# Patient Record
Sex: MALE | Race: WHITE | NOT HISPANIC OR LATINO | URBAN - METROPOLITAN AREA
[De-identification: names, ages, dates, MRNs, and addresses within clinical notes are randomized per-mention and may not be internally consistent; named-entity substitution may affect disease eponyms.]

---

## 2017-07-10 ENCOUNTER — COMMUNICATION - HEALTHEAST (OUTPATIENT)
Dept: FAMILY MEDICINE | Facility: CLINIC | Age: 62
End: 2017-07-10

## 2017-07-10 DIAGNOSIS — I10 ESSENTIAL HYPERTENSION: ICD-10-CM

## 2017-09-05 ENCOUNTER — RECORDS - HEALTHEAST (OUTPATIENT)
Dept: ADMINISTRATIVE | Facility: OTHER | Age: 62
End: 2017-09-05

## 2018-01-19 ENCOUNTER — OFFICE VISIT - HEALTHEAST (OUTPATIENT)
Dept: FAMILY MEDICINE | Facility: CLINIC | Age: 63
End: 2018-01-19

## 2018-01-19 ENCOUNTER — RECORDS - HEALTHEAST (OUTPATIENT)
Dept: GENERAL RADIOLOGY | Facility: CLINIC | Age: 63
End: 2018-01-19

## 2018-01-19 DIAGNOSIS — E66.01 MORBID OBESITY (H): ICD-10-CM

## 2018-01-19 DIAGNOSIS — E88.810 METABOLIC SYNDROME: ICD-10-CM

## 2018-01-19 DIAGNOSIS — Z23 NEED FOR TETANUS BOOSTER: ICD-10-CM

## 2018-01-19 DIAGNOSIS — I10 ESSENTIAL (PRIMARY) HYPERTENSION: ICD-10-CM

## 2018-01-19 DIAGNOSIS — I10 ESSENTIAL HYPERTENSION: ICD-10-CM

## 2018-01-19 LAB
ALBUMIN SERPL-MCNC: 3.8 G/DL (ref 3.5–5)
ALP SERPL-CCNC: 77 U/L (ref 45–120)
ALT SERPL W P-5'-P-CCNC: 28 U/L (ref 0–45)
ANION GAP SERPL CALCULATED.3IONS-SCNC: 7 MMOL/L (ref 5–18)
AST SERPL W P-5'-P-CCNC: 21 U/L (ref 0–40)
BILIRUB SERPL-MCNC: 0.8 MG/DL (ref 0–1)
BUN SERPL-MCNC: 15 MG/DL (ref 8–22)
CALCIUM SERPL-MCNC: 9 MG/DL (ref 8.5–10.5)
CHLORIDE BLD-SCNC: 103 MMOL/L (ref 98–107)
CHOLEST SERPL-MCNC: 161 MG/DL
CO2 SERPL-SCNC: 29 MMOL/L (ref 22–31)
CREAT SERPL-MCNC: 0.8 MG/DL (ref 0.7–1.3)
FASTING STATUS PATIENT QL REPORTED: NO
GFR SERPL CREATININE-BSD FRML MDRD: >60 ML/MIN/1.73M2
GLUCOSE BLD-MCNC: 89 MG/DL (ref 70–125)
HBA1C MFR BLD: 5.5 % (ref 3.5–6)
HDLC SERPL-MCNC: 27 MG/DL
LDLC SERPL CALC-MCNC: 92 MG/DL
POTASSIUM BLD-SCNC: 4.4 MMOL/L (ref 3.5–5)
PROT SERPL-MCNC: 6.5 G/DL (ref 6–8)
SODIUM SERPL-SCNC: 139 MMOL/L (ref 136–145)
TRIGL SERPL-MCNC: 208 MG/DL

## 2018-01-19 ASSESSMENT — MIFFLIN-ST. JEOR: SCORE: 2237.76

## 2018-01-22 ENCOUNTER — COMMUNICATION - HEALTHEAST (OUTPATIENT)
Dept: FAMILY MEDICINE | Facility: CLINIC | Age: 63
End: 2018-01-22

## 2019-02-19 ENCOUNTER — OFFICE VISIT - HEALTHEAST (OUTPATIENT)
Dept: FAMILY MEDICINE | Facility: CLINIC | Age: 64
End: 2019-02-19

## 2019-02-19 DIAGNOSIS — I10 ESSENTIAL HYPERTENSION: ICD-10-CM

## 2019-02-19 DIAGNOSIS — M17.0 PRIMARY OSTEOARTHRITIS OF BOTH KNEES: ICD-10-CM

## 2019-02-19 DIAGNOSIS — E66.01 MORBID OBESITY (H): ICD-10-CM

## 2019-02-19 LAB
ALBUMIN SERPL-MCNC: 3.9 G/DL (ref 3.5–5)
ALP SERPL-CCNC: 69 U/L (ref 45–120)
ALT SERPL W P-5'-P-CCNC: 36 U/L (ref 0–45)
ANION GAP SERPL CALCULATED.3IONS-SCNC: 11 MMOL/L (ref 5–18)
AST SERPL W P-5'-P-CCNC: 21 U/L (ref 0–40)
BILIRUB SERPL-MCNC: 0.9 MG/DL (ref 0–1)
BUN SERPL-MCNC: 12 MG/DL (ref 8–22)
CALCIUM SERPL-MCNC: 9.1 MG/DL (ref 8.5–10.5)
CHLORIDE BLD-SCNC: 105 MMOL/L (ref 98–107)
CHOLEST SERPL-MCNC: 160 MG/DL
CO2 SERPL-SCNC: 25 MMOL/L (ref 22–31)
CREAT SERPL-MCNC: 0.87 MG/DL (ref 0.7–1.3)
FASTING STATUS PATIENT QL REPORTED: NO
GFR SERPL CREATININE-BSD FRML MDRD: >60 ML/MIN/1.73M2
GLUCOSE BLD-MCNC: 97 MG/DL (ref 70–125)
HBA1C MFR BLD: 5.2 % (ref 3.5–6)
HDLC SERPL-MCNC: 28 MG/DL
LDLC SERPL CALC-MCNC: 80 MG/DL
POTASSIUM BLD-SCNC: 4.2 MMOL/L (ref 3.5–5)
PROT SERPL-MCNC: 6.6 G/DL (ref 6–8)
SODIUM SERPL-SCNC: 141 MMOL/L (ref 136–145)
TRIGL SERPL-MCNC: 259 MG/DL

## 2019-02-20 ENCOUNTER — COMMUNICATION - HEALTHEAST (OUTPATIENT)
Dept: FAMILY MEDICINE | Facility: CLINIC | Age: 64
End: 2019-02-20

## 2020-03-26 ENCOUNTER — COMMUNICATION - HEALTHEAST (OUTPATIENT)
Dept: FAMILY MEDICINE | Facility: CLINIC | Age: 65
End: 2020-03-26

## 2020-03-26 DIAGNOSIS — I10 ESSENTIAL HYPERTENSION: ICD-10-CM

## 2020-07-13 ENCOUNTER — COMMUNICATION - HEALTHEAST (OUTPATIENT)
Dept: FAMILY MEDICINE | Facility: CLINIC | Age: 65
End: 2020-07-13

## 2020-07-13 DIAGNOSIS — I10 ESSENTIAL HYPERTENSION: ICD-10-CM

## 2020-08-10 ENCOUNTER — COMMUNICATION - HEALTHEAST (OUTPATIENT)
Dept: FAMILY MEDICINE | Facility: CLINIC | Age: 65
End: 2020-08-10

## 2020-08-10 DIAGNOSIS — I10 ESSENTIAL HYPERTENSION: ICD-10-CM

## 2020-09-14 ENCOUNTER — COMMUNICATION - HEALTHEAST (OUTPATIENT)
Dept: FAMILY MEDICINE | Facility: CLINIC | Age: 65
End: 2020-09-14

## 2020-09-14 DIAGNOSIS — I10 ESSENTIAL HYPERTENSION: ICD-10-CM

## 2020-10-19 ENCOUNTER — COMMUNICATION - HEALTHEAST (OUTPATIENT)
Dept: FAMILY MEDICINE | Facility: CLINIC | Age: 65
End: 2020-10-19

## 2020-10-19 DIAGNOSIS — I10 ESSENTIAL HYPERTENSION: ICD-10-CM

## 2020-10-20 ENCOUNTER — OFFICE VISIT - HEALTHEAST (OUTPATIENT)
Dept: FAMILY MEDICINE | Facility: CLINIC | Age: 65
End: 2020-10-20

## 2020-10-20 ENCOUNTER — COMMUNICATION - HEALTHEAST (OUTPATIENT)
Dept: FAMILY MEDICINE | Facility: CLINIC | Age: 65
End: 2020-10-20

## 2020-10-20 DIAGNOSIS — Z11.59 ENCOUNTER FOR HCV SCREENING TEST FOR LOW RISK PATIENT: ICD-10-CM

## 2020-10-20 DIAGNOSIS — I10 ESSENTIAL HYPERTENSION: ICD-10-CM

## 2020-10-20 LAB
ALBUMIN SERPL-MCNC: 3.5 G/DL (ref 3.5–5)
ALP SERPL-CCNC: 59 U/L (ref 45–120)
ALT SERPL W P-5'-P-CCNC: 21 U/L (ref 0–45)
ANION GAP SERPL CALCULATED.3IONS-SCNC: 8 MMOL/L (ref 5–18)
AST SERPL W P-5'-P-CCNC: 21 U/L (ref 0–40)
BILIRUB SERPL-MCNC: 0.8 MG/DL (ref 0–1)
BUN SERPL-MCNC: 11 MG/DL (ref 8–22)
CALCIUM SERPL-MCNC: 8.5 MG/DL (ref 8.5–10.5)
CHLORIDE BLD-SCNC: 99 MMOL/L (ref 98–107)
CHOLEST SERPL-MCNC: 103 MG/DL
CO2 SERPL-SCNC: 30 MMOL/L (ref 22–31)
CREAT SERPL-MCNC: 0.78 MG/DL (ref 0.7–1.3)
FASTING STATUS PATIENT QL REPORTED: YES
GFR SERPL CREATININE-BSD FRML MDRD: >60 ML/MIN/1.73M2
GLUCOSE BLD-MCNC: 99 MG/DL (ref 70–125)
HCV AB SERPL QL IA: NEGATIVE
HDLC SERPL-MCNC: 20 MG/DL
LDLC SERPL CALC-MCNC: 57 MG/DL
POTASSIUM BLD-SCNC: 4.4 MMOL/L (ref 3.5–5)
PROT SERPL-MCNC: 6.6 G/DL (ref 6–8)
SODIUM SERPL-SCNC: 137 MMOL/L (ref 136–145)
TRIGL SERPL-MCNC: 131 MG/DL

## 2020-10-20 RX ORDER — ATENOLOL 25 MG/1
TABLET ORAL
Qty: 90 TABLET | Refills: 3 | Status: SHIPPED | OUTPATIENT
Start: 2020-10-20 | End: 2022-01-17

## 2020-10-20 ASSESSMENT — MIFFLIN-ST. JEOR: SCORE: 2133.43

## 2020-11-02 ENCOUNTER — VIRTUAL VISIT (OUTPATIENT)
Dept: FAMILY MEDICINE | Facility: OTHER | Age: 65
End: 2020-11-02
Payer: COMMERCIAL

## 2020-11-02 PROCEDURE — 99421 OL DIG E/M SVC 5-10 MIN: CPT | Performed by: PHYSICIAN ASSISTANT

## 2020-11-02 NOTE — PROGRESS NOTES
"Date: 2020 10:14:11  Clinician: Jerome Nelson  Clinician NPI: [id number]  Patient: kaya baker  Patient : 1955  Patient Address: [address] [place], [place] [address] [address]  Patient Phone: [phone]  Visit Protocol: URI  Patient Summary:  kaya is a 65 year old ( : 1955 ) male who initiated a [facility] Visit for COVID-19 (Coronavirus) evaluation and screening. When asked the question \"Please sign me up to receive news, health information and promotions from [facility].\", kaya responded \"Yes\".    kaya states his symptoms started gradually 3-4 days ago. After his symptoms started, they improved and then got worse again.   His symptoms consist of malaise, a sore throat, and a cough.   Symptom details     Cough: kaya coughs every 5-10 minutes and his cough is not more bothersome at night. Phlegm does not come into his throat when he coughs. He does not believe his cough is caused by post-nasal drip.     Sore throat: kaya reports having moderate throat pain (4-6 on a 10 point pain scale), does not have exudate on his tonsils, and can swallow liquids. He is not sure if the lymph nodes in his neck are enlarged. A rash has not appeared on the skin since the sore throat started.      kaya denies having vomiting, rhinitis, facial pain or pressure, myalgias, chills, teeth pain, ageusia, diarrhea, ear pain, headache, wheezing, fever, nasal congestion, nausea, and anosmia. He also denies having recent facial or sinus surgery in the past 60 days. He is not experiencing dyspnea.   Precipitating events  Within the past week, kaya has not been exposed to someone with strep throat. He has not recently been exposed to someone with influenza. kaya has not been in close contact with any high risk individuals.   Pertinent COVID-19 (Coronavirus) information  kaya does not work or volunteer as healthcare worker or a . In the past 14 days, kaya has not worked or volunteered at a healthcare facility " or group living setting.   In the past 14 days, he also has not lived in a congregate living setting.   kaya has had a close contact with a laboratory-confirmed COVID-19 patient within 14 days of symptom onset. He was not exposed at his work. Date kaya was exposed to the laboratory-confirmed COVID-19 patient: 11/02/2020   Additional information about contact with COVID-19 (Coronavirus) patient as reported by the patient (free text): roommate at home    Since December 2019, kaya has not been tested for COVID-19 and has had upper respiratory infection (URI) or influenza-like illness.      Date(s) of previous URI or influenza-like illness (free-text): 10/22/2020 -11/02/2020     Symptoms kaya experienced during previous URI or influenza-like illness as reported by the patient (free-text): flu like symtoms        Pertinent medical history  kaya has taken an antibiotic medication in the past month. Antibiotic details as reported by the patient (free text): chavoks jordin   kaya needs a return to work/school note.   Weight: 300 lbs   kaya does not smoke or use smokeless tobacco.   Weight: 300 lbs    MEDICATIONS: atenolol-chlorthalidone oral, ALLERGIES: NKDA  Clinician Response:  Dear kaya,   Based on your exposure to COVID-19 (coronavirus), we would like to test you for this virus.  1. Please call [phone] to schedule your visit. Explain that you were referred by [facility] to have a COVID-19 test. Be ready to share your [facility] visit ID number.   The following will serve as your written order for this COVID Test, ordered by me, for the indication of suspected COVID [Z20.828]: The test will be ordered in [facility], our electronic health record, after you are scheduled. It will show as ordered and authorized by Miky Villa MD.  Order: COVID-19 (coronavirus) PCR for ASYMPTOMATIC EXPOSURE testing from [facility].   If you know you have had close contact with someone who tested positive, you should be quarantined for 14  days after this exposure. You should stay in quarantine for the14 days even if the covid test is negative, the optimal time to test after exposure is 5-7 days from the exposure  Quarantine means   What should I do?  For safety, it's very important to follow these rules. Do this for 14 days after the date you were last exposed to the virus..  Stay home and away from others. Don't go to school or anywhere else. Generally quarantine means staying home from work but there are some exceptions to this. Please contact your workplace.   No hugging, kissing or shaking hands.  Don't let anyone visit.  Cover your mouth and nose with a mask, tissue or washcloth to avoid spreading germs.  Wash your hands and face often. Use soap and water.  What are the symptoms of COVID-19?  The most common symptoms are cough, fever and trouble breathing. Less common symptoms include headache, body aches, fatigue (feeling very tired), chills, sore throat, stuffy or runny nose, diarrhea (loose poop), loss of taste or smell, belly pain, and nausea or vomiting (feeling sick to your stomach or throwing up).  After 14 days, if you have still don't have symptoms, you likely don't have this virus.  If you develop symptoms, follow these guidelines.  If you're normally healthy: Please start another [facility] visit to report your symptoms. Go to [email or web address].  If you have a serious health problem (like cancer, heart failure, an organ transplant or kidney disease): Call your specialty clinic. Let them know that you might have COVID-19.  2. When it's time for your COVID test:  Stay at least 6 feet away from others. (If someone will drive you to your test, stay in the backseat, as far away from the  as you can.)  Cover your mouth and nose with a mask, tissue or washcloth.  Go straight to the testing site. Don't make any stops on the way there or back.  Please note  Caregivers in these groups are at risk for severe illness due to COVID-19:  o People 65  years and older  o People who live in a nursing home or long-term care facility  o People with chronic disease (lung, heart, cancer, diabetes, kidney, liver, immunologic)  o People who have a weakened immune system, including those who:  Are in cancer treatment  Take medicine that weakens the immune system, such as corticosteroids  Had a bone marrow or organ transplant  Have an immune deficiency  Have poorly controlled HIV or AIDS  Are obese (body mass index of 40 or higher)  Smoke regularly  Where can I get more information?  [facility] -- About COVID-19: [email or web address]  CDC -- What to Do If You're Sick: www.cdc.gov/coronavirus/2019-ncov/about/steps-when-sick.html  CDC -- Ending Home Isolation: www.cdc.gov/coronavirus/2019-ncov/hcp/disposition-in-home-patients.html  [facility] -- Caring for Someone: www.cdc.gov/coronavirus/2019-ncov/if-you-are-sick/care-for-someone.html  [facility] -- Interim Guidance for Hospital Discharge to Home: www.[facility].[place]./diseases/coronavirus/hcp/hospdischarge.pdf  [facility] clinical trials (COVID-19 research studies): clinicalaffairs.[facility]-clinical-trials  Below are the COVID-19 hotlines at the [place] Department of Health ([facility]). Interpreters are available.  For health questions: Call 105-768-3648 or 1-292.451.7204 (7 a.m. to 7 p.m.)  For questions about schools and childcare: Call 741-141-8624 or 1-384.714.8965 (7 a.m. to 7 p.m.)    Diagnosis: Contact with and (suspected) exposure to other viral communicable diseases  Diagnosis ICD: Z20.828

## 2020-11-04 ENCOUNTER — AMBULATORY - HEALTHEAST (OUTPATIENT)
Dept: FAMILY MEDICINE | Facility: CLINIC | Age: 65
End: 2020-11-04

## 2020-11-04 DIAGNOSIS — Z20.822 SUSPECTED COVID-19 VIRUS INFECTION: ICD-10-CM

## 2020-11-05 ENCOUNTER — AMBULATORY - HEALTHEAST (OUTPATIENT)
Dept: FAMILY MEDICINE | Facility: CLINIC | Age: 65
End: 2020-11-05

## 2020-11-05 DIAGNOSIS — Z20.822 SUSPECTED COVID-19 VIRUS INFECTION: ICD-10-CM

## 2020-11-06 ENCOUNTER — COMMUNICATION - HEALTHEAST (OUTPATIENT)
Dept: SCHEDULING | Facility: CLINIC | Age: 65
End: 2020-11-06

## 2021-04-16 ENCOUNTER — COMMUNICATION - HEALTHEAST (OUTPATIENT)
Dept: FAMILY MEDICINE | Facility: CLINIC | Age: 66
End: 2021-04-16

## 2021-05-31 VITALS — WEIGHT: 315 LBS | HEIGHT: 68 IN | BODY MASS INDEX: 47.74 KG/M2

## 2021-06-02 VITALS — WEIGHT: 315 LBS | BODY MASS INDEX: 49.19 KG/M2

## 2021-06-04 VITALS
BODY MASS INDEX: 46.07 KG/M2 | DIASTOLIC BLOOD PRESSURE: 85 MMHG | HEIGHT: 68 IN | TEMPERATURE: 99.6 F | HEART RATE: 77 BPM | RESPIRATION RATE: 22 BRPM | WEIGHT: 304 LBS | SYSTOLIC BLOOD PRESSURE: 136 MMHG

## 2021-06-07 NOTE — TELEPHONE ENCOUNTER
Patient needs to be called and informed his rx has been refilled and sent to the [facility] pharmacy. He presented to the clinic regarding this.

## 2021-06-12 NOTE — PROGRESS NOTES
"ASSESSMENT/PLAN:  1. Essential hypertension  atenoloL (TENORMIN) 25 MG tablet    Comprehensive Metabolic Panel    Lipid Cascade FASTING   2. Encounter for HCV screening test for low risk patient  Hepatitis C Antibody (Anti-HCV)       This is a 66 yo male with:  1.  Hypertension - blood pressure is actually controlled today!  He continues to work - splits time between [facility] and [place], [place].  Feels good - very active when home in [place].  Check labs today - continue same medications.  2.  Health Maintenance - discussed recommendation for Hepatitis C screening - ordered; reviewed immunizations due - declined; declined other cancer screening  Return in about 6 months (around 4/20/2021) for BP Check.      Medications Discontinued During This Encounter   Medication Reason     atenoloL (TENORMIN) 25 MG tablet Reorder     There are no Patient Instructions on file for this visit.    Chief Complaint:  Chief Complaint   Patient presents with     medication check       HPI:   Miguel Baca is a 65 y.o. 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    No chest pain, no short of breath  Just slowing up a little bit - \"natural\"        PMH:   Patient Active Problem List    Diagnosis Date Noted     Primary osteoarthritis of both knees 02/19/2019     Morbid obesity (H)      Essential Hypertension      Seborrheic Keratosis      Knee Sprain      Past Medical History:   Diagnosis Date     Acute chest pain 12/22/2015     Cellulitis     Created by Conversion      Colon polyps      Cough     Created by Conversion      Hypertension      Past Surgical History:   Procedure Laterality Date     KNEE ARTHROSCOPY Left 2012     Social History     Socioeconomic History     Marital status: Single     Spouse name: Not on file     Number of children: Not on file     Years of education: Not on file     Highest education level: Not on " file   Occupational History     Occupation: works city desk for [employer]     Employer: [employer]   Social Needs     Financial resource strain: Not on file     Food insecurity     Worry: Not on file     Inability: Not on file     Transportation needs     Medical: Not on file     Non-medical: Not on file   Tobacco Use     Smoking status: Former Smoker     Smokeless tobacco: Never Used   Substance and Sexual Activity     Alcohol use: Not on file     Drug use: Not on file     Sexual activity: Not on file   Lifestyle     Physical activity     Days per week: Not on file     Minutes per session: Not on file     Stress: Not on file   Relationships     Social connections     Talks on phone: Not on file     Gets together: Not on file     Attends Yazdanism service: Not on file     Active member of club or organization: Not on file     Attends meetings of clubs or organizations: Not on file     Relationship status: Not on file     Intimate partner violence     Fear of current or ex partner: Not on file     Emotionally abused: Not on file     Physically abused: Not on file     Forced sexual activity: Not on file   Other Topics Concern     Not on file   Social History Narrative    Lives independently near [place], [place]    Works in [place] so rents a room from a [employer] during the work week     Family History   Problem Relation Age of Onset     Coronary artery disease Father         s/p stents, CABG     Coronary artery disease Brother         s/p stents     Cancer Mother      Parkinsonism Paternal Grandmother      Diabetes type II Paternal Grandmother        Meds:    Current Outpatient Medications:      aspirin 81 MG EC tablet, Take 81 mg by mouth daily., Disp: , Rfl:      atenoloL (TENORMIN) 25 MG tablet, TAKE 1 TABLET BY MOUTH ONCE DAILY ., Disp: 90 tablet, Rfl: 3    Allergies:  No Known Allergies    ROS:  Pertinent positives as noted in HPI; otherwise 12 point ROS negative.      Physical  "Exam:  EXAM:  /85 (Patient Site: Right Arm, Patient Position: Sitting, Cuff Size: Adult Large)   Pulse 77   Temp 99.6  F (37.6  C) (Tympanic)   Resp 22   Ht 5' 8\" (1.727 m)   Wt (!) 304 lb (137.9 kg)   BMI 46.22 kg/m     Gen:  NAD, appears well, well-hydrated  HEENT:  TMs nl, oropharynx benign, nasal mucosa nl, conjunctiva clear  Neck:  Supple, no adenopathy, no thyromegaly, no carotid bruits, no JVD  Lungs:  Clear to auscultation bilaterally  Cor:  RRR no murmur  Abd:  Soft, nontender, BS+, no masses, no guarding or rebound, no HSM  Extr:  Neg., no edema, normal pulses  Neuro:  No asymmetry  Skin:  Warm/dry        Results:  Results for orders placed or performed in visit on 10/20/20   Comprehensive Metabolic Panel   Result Value Ref Range    Sodium 137 136 - 145 mmol/L    Potassium 4.4 3.5 - 5.0 mmol/L    Chloride 99 98 - 107 mmol/L    CO2 30 22 - 31 mmol/L    Anion Gap, Calculation 8 5 - 18 mmol/L    Glucose 99 70 - 125 mg/dL    BUN 11 8 - 22 mg/dL    Creatinine 0.78 0.70 - 1.30 mg/dL    GFR MDRD Af Amer >60 >60 mL/min/1.73m2    GFR MDRD Non Af Amer >60 >60 mL/min/1.73m2    Bilirubin, Total 0.8 0.0 - 1.0 mg/dL    Calcium 8.5 8.5 - 10.5 mg/dL    Protein, Total 6.6 6.0 - 8.0 g/dL    Albumin 3.5 3.5 - 5.0 g/dL    Alkaline Phosphatase 59 45 - 120 U/L    AST 21 0 - 40 U/L    ALT 21 0 - 45 U/L   Lipid Cascade FASTING   Result Value Ref Range    Cholesterol 103 <=199 mg/dL    Triglycerides 131 <=149 mg/dL    HDL Cholesterol 20 (L) >=40 mg/dL    LDL Calculated 57 <=129 mg/dL    Patient Fasting > 8hrs? Yes    Hepatitis C Antibody (Anti-HCV)   Result Value Ref Range    Hepatitis C Ab Negative Negative             "

## 2021-06-15 NOTE — PROGRESS NOTES
ASSESSMENT/PLAN:  1. Essential hypertension  atenolol (TENORMIN) 25 MG tablet    Comprehensive Metabolic Panel    Lipid Profile    XR Chest 2 Views   2. Morbid obesity  Glycosylated Hemoglobin A1c   3. Metabolic syndrome  Glycosylated Hemoglobin A1c   4. Need for tetanus booster  CANCELED: Td, Preservative Free (green label)       This is a 61 yo male here for:  1.  Essential Hypertension - blood pressure currently well controlled at 132/72.  Currently taking only Atenolol.  Doing well - check CMP, lipid profile.    Will check a Chest xray - reviewed with patient - looks normal.    2.  Metabolic syndrome/morbid obesity - check A1c.  Discussed diet/exercise  The following high BMI interventions were performed this visit: encouragement to exercise and lifestyle education regarding diet  3.  Health Maintenance - due for tetanus booster.  Will give Td today.         Medications Discontinued During This Encounter   Medication Reason     atenolol (TENORMIN) 25 MG tablet Reorder     There are no Patient Instructions on file for this visit.    Chief Complaint:  Chief Complaint   Patient presents with     Medication Refill     Blood Pressure Check       HPI:   Miguel Baca is a 62 y.o. male c/o  Still working  No problems  No cp, no SOB      PMH:   Patient Active Problem List    Diagnosis Date Noted     Morbid obesity      Essential Hypertension      Seborrheic Keratosis      Knee Sprain      Past Medical History:   Diagnosis Date     Acute chest pain 12/22/2015     Cellulitis     Created by Conversion      Colon polyps      Cough     Created by Conversion      Hypertension      Past Surgical History:   Procedure Laterality Date     KNEE ARTHROSCOPY Left 2012     Social History     Social History     Marital status: Single     Spouse name: N/A     Number of children: N/A     Years of education: N/A     Occupational History     works city desk for [employer]     Social History Main Topics     Smoking  "status: Former Smoker     Smokeless tobacco: Never Used     Alcohol use Not on file     Drug use: Not on file     Sexual activity: Not on file     Other Topics Concern     Not on file     Social History Narrative    Lives independently near [facility], [place]    Works in [place] so rents a room from a [employer] during the work week       Meds:    Current Outpatient Prescriptions:      aspirin 81 MG EC tablet, Take 81 mg by mouth daily., Disp: , Rfl:      atenolol (TENORMIN) 25 MG tablet, TAKE 1 TABLET BY MOUTH EVERY DAY, Disp: 90 tablet, Rfl: 3    Allergies:  No Known Allergies    ROS:  Pertinent positives as noted in HPI; otherwise 12 point ROS negative.      Physical Exam:  EXAM:  /72 (Patient Site: Left Arm, Patient Position: Sitting, Cuff Size: Adult Large)  Pulse 63  Temp 97.8  F (36.6  C) (Oral)   Resp 14  Ht 5' 8\" (1.727 m)  Wt (!) 327 lb (148.3 kg)  SpO2 93%  BMI 49.72 kg/m2   Gen:  NAD, appears well, well-hydrated  HEENT:  TMs nl, oropharynx benign, nasal mucosa nl, conjunctiva clear  Neck:  Supple, no adenopathy, no thyromegaly, no carotid bruits, no JVD  Lungs:  Clear to auscultation bilaterally  Cor:  RRR no murmur  Abd:  Soft, nontender, BS+, no masses, no guarding or rebound, no HSM  Extr:  Neg.  Neuro:  No asymmetry  Skin:  Warm/dry        Results:  Results for orders placed or performed in visit on 01/19/18   Comprehensive Metabolic Panel   Result Value Ref Range    Sodium 139 136 - 145 mmol/L    Potassium 4.4 3.5 - 5.0 mmol/L    Chloride 103 98 - 107 mmol/L    CO2 29 22 - 31 mmol/L    Anion Gap, Calculation 7 5 - 18 mmol/L    Glucose 89 70 - 125 mg/dL    BUN 15 8 - 22 mg/dL    Creatinine 0.80 0.70 - 1.30 mg/dL    GFR MDRD Af Amer >60 >60 mL/min/1.73m2    GFR MDRD Non Af Amer >60 >60 mL/min/1.73m2    Bilirubin, Total 0.8 0.0 - 1.0 mg/dL    Calcium 9.0 8.5 - 10.5 mg/dL    Protein, Total 6.5 6.0 - 8.0 g/dL    Albumin 3.8 3.5 - 5.0 g/dL    Alkaline Phosphatase 77 45 - 120 U/L    AST " 21 0 - 40 U/L    ALT 28 0 - 45 U/L   Lipid Profile   Result Value Ref Range    Triglycerides 208 (H) <=149 mg/dL    Cholesterol 161 <=199 mg/dL    LDL Calculated 92 <=129 mg/dL    HDL Cholesterol 27 (L) >=40 mg/dL    Patient Fasting > 8hrs? No    Glycosylated Hemoglobin A1c   Result Value Ref Range    Hemoglobin A1c 5.5 3.5 - 6.0 %

## 2021-06-16 PROBLEM — M17.0 PRIMARY OSTEOARTHRITIS OF BOTH KNEES: Status: ACTIVE | Noted: 2019-02-19

## 2021-06-18 NOTE — LETTER
Letter by Yahaira Fernandez MD at      Author: Yahaira Fernandez MD Service: -- Author Type: --    Filed:  Encounter Date: 2/20/2019 Status: (Other)       Miguel Baca  [address]  [address]  [facility] [address]             February 20, 2019         Dear Mr. Baca,    Below are the results from your recent visit:    Resulted Orders   Comprehensive Metabolic Panel   Result Value Ref Range    Sodium 141 136 - 145 mmol/L    Potassium 4.2 3.5 - 5.0 mmol/L    Chloride 105 98 - 107 mmol/L    CO2 25 22 - 31 mmol/L    Anion Gap, Calculation 11 5 - 18 mmol/L    Glucose 97 70 - 125 mg/dL    BUN 12 8 - 22 mg/dL    Creatinine 0.87 0.70 - 1.30 mg/dL    GFR MDRD Af Amer >60 >60 mL/min/1.73m2    GFR MDRD Non Af Amer >60 >60 mL/min/1.73m2    Bilirubin, Total 0.9 0.0 - 1.0 mg/dL    Calcium 9.1 8.5 - 10.5 mg/dL    Protein, Total 6.6 6.0 - 8.0 g/dL    Albumin 3.9 3.5 - 5.0 g/dL    Alkaline Phosphatase 69 45 - 120 U/L    AST 21 0 - 40 U/L    ALT 36 0 - 45 U/L    Narrative    Fasting Glucose reference range is 70-99 mg/dL per  American Diabetes Association (ADA) guidelines.   Lipid Profile   Result Value Ref Range    Triglycerides 259 (H) <=149 mg/dL    Cholesterol 160 <=199 mg/dL    LDL Calculated 80 <=129 mg/dL    HDL Cholesterol 28 (L) >=40 mg/dL    Patient Fasting > 8hrs? No    Glycosylated Hemoglobin A1c   Result Value Ref Range    Hemoglobin A1c 5.2 3.5 - 6.0 %       Your labs look okay.    Please call with questions or contact us using [employer].    Sincerely,        Electronically signed by Yahaira Fernandez MD

## 2021-06-21 NOTE — LETTER
Letter by Yahaira Fernandez MD at      Author: Yahaira Fernandez MD Service: -- Author Type: --    Filed:  Encounter Date: 10/20/2020 Status: (Other)         Miguel Baca  [address]  [address]  [facility] [address]             October 20, 2020         Dear Mr. Baca,    Below are the results from your recent visit:    Resulted Orders   Comprehensive Metabolic Panel   Result Value Ref Range    Sodium 137 136 - 145 mmol/L    Potassium 4.4 3.5 - 5.0 mmol/L    Chloride 99 98 - 107 mmol/L    CO2 30 22 - 31 mmol/L    Anion Gap, Calculation 8 5 - 18 mmol/L    Glucose 99 70 - 125 mg/dL    BUN 11 8 - 22 mg/dL    Creatinine 0.78 0.70 - 1.30 mg/dL    GFR MDRD Af Amer >60 >60 mL/min/1.73m2    GFR MDRD Non Af Amer >60 >60 mL/min/1.73m2    Bilirubin, Total 0.8 0.0 - 1.0 mg/dL    Calcium 8.5 8.5 - 10.5 mg/dL    Protein, Total 6.6 6.0 - 8.0 g/dL    Albumin 3.5 3.5 - 5.0 g/dL    Alkaline Phosphatase 59 45 - 120 U/L    AST 21 0 - 40 U/L    ALT 21 0 - 45 U/L    Narrative    Fasting Glucose reference range is 70-99 mg/dL per  American Diabetes Association (ADA) guidelines.   Lipid [place] FASTING   Result Value Ref Range    Cholesterol 103 <=199 mg/dL    Triglycerides 131 <=149 mg/dL    HDL Cholesterol 20 (L) >=40 mg/dL    LDL Calculated 57 <=129 mg/dL    Patient Fasting > 8hrs? Yes    Hepatitis C Antibody (Anti-HCV)   Result Value Ref Range    Hepatitis C Ab Negative Negative       Labs look good!  Keep up the good work.  See you in a year!    Please call with questions or contact us using [employer].    Sincerely,        Electronically signed by Yahaira Fernandez MD

## 2021-06-24 NOTE — PROGRESS NOTES
ASSESSMENT/PLAN:  1. Essential hypertension  atenolol (TENORMIN) 25 MG tablet    Comprehensive Metabolic Panel    Lipid Profile   2. Morbid obesity (H)  Glycosylated Hemoglobin A1c   3. Primary osteoarthritis of both knees         This is a 62 yo male with:  1.  Hypertension - he is still taking low dose Atenolol - tolerating this well - and blood pressure is controlled.  I would continue the ATenolol - check labs today.   2.  Morbid obesity:  The following high BMI interventions were performed this visit: encouragement to exercise and lifestyle education regarding diet   3.  Osteoarthritis - knees - does not desire or require any intervention - discussed weight loss may help his symptoms; discussed Acetaminophen scheduled daily to two times a day may be helpful as well.    No Follow-up on file.      Medications Discontinued During This Encounter   Medication Reason     atenolol (TENORMIN) 25 MG tablet Reorder     There are no Patient Instructions on file for this visit.    Chief Complaint:  Chief Complaint   Patient presents with     Medication Refill       HPI:   Miguel Baca is a 63 y.o. male c/o  Still working -   Needs refills on meds -  Taking Atenolol 25 mg daily   ASA 81  No chest pain, no shortness of breath        PMH:   Patient Active Problem List    Diagnosis Date Noted     Primary osteoarthritis of both knees 02/19/2019     Morbid obesity (H)      Essential Hypertension      Seborrheic Keratosis      Knee Sprain      Past Medical History:   Diagnosis Date     Acute chest pain 12/22/2015     Cellulitis     Created by Conversion      Colon polyps      Cough     Created by Conversion      Hypertension      Past Surgical History:   Procedure Laterality Date     KNEE ARTHROSCOPY Left 2012     Social History     Socioeconomic History     Marital status: Single     Spouse name: Not on file     Number of children: Not on file     Years of education: Not on file     Highest education level: Not on file    Occupational History     Occupation: works city desk for [employer]     Employer: [employer]   Social Needs     Financial resource strain: Not on file     Food insecurity:     Worry: Not on file     Inability: Not on file     Transportation needs:     Medical: Not on file     Non-medical: Not on file   Tobacco Use     Smoking status: Former Smoker     Smokeless tobacco: Never Used   Substance and Sexual Activity     Alcohol use: Not on file     Drug use: Not on file     Sexual activity: Not on file   Lifestyle     Physical activity:     Days per week: Not on file     Minutes per session: Not on file     Stress: Not on file   Relationships     Social connections:     Talks on phone: Not on file     Gets together: Not on file     Attends Mandaen service: Not on file     Active member of club or organization: Not on file     Attends meetings of clubs or organizations: Not on file     Relationship status: Not on file     Intimate partner violence:     Fear of current or ex partner: Not on file     Emotionally abused: Not on file     Physically abused: Not on file     Forced sexual activity: Not on file   Other Topics Concern     Not on file   Social History Narrative    Lives independently near [place], [place]    Works in [place] so rents a room from a [employer] during the work week     Family History   Problem Relation Age of Onset     Coronary artery disease Father         s/p stents, CABG     Coronary artery disease Brother         s/p stents     Cancer Mother      Parkinsonism Paternal Grandmother      Diabetes type II Paternal Grandmother        Meds:    Current Outpatient Medications:      aspirin 81 MG EC tablet, Take 81 mg by mouth daily., Disp: , Rfl:      atenolol (TENORMIN) 25 MG tablet, TAKE 1 TABLET BY MOUTH EVERY DAY, Disp: 90 tablet, Rfl: 3    Allergies:  No Known Allergies    ROS:  Pertinent positives as noted in HPI; otherwise 12 point ROS negative.      Physical Exam:  EXAM:  BP  118/78 (Patient Site: Left Arm, Patient Position: Sitting, Cuff Size: Adult Large)   Pulse 64   Resp 20   Wt (!) 323 lb 8 oz (146.7 kg)   SpO2 90%   BMI 49.19 kg/m     Gen:  NAD, appears well, well-hydrated  HEENT:  TMs nl, oropharynx benign, nasal mucosa nl, conjunctiva clear  Neck:  Supple, no adenopathy, no thyromegaly, no carotid bruits, no JVD  Lungs:  Clear to auscultation bilaterally  Cor:  RRR no murmur  Abd:  Soft, nontender, BS+, no masses, no guarding or rebound, no HSM  Extr:  DJD - knees bilaterally  Neuro:  No asymmetry  Skin:  Warm/dry        Results:  Results for orders placed or performed in visit on 02/19/19   Comprehensive Metabolic Panel   Result Value Ref Range    Sodium 141 136 - 145 mmol/L    Potassium 4.2 3.5 - 5.0 mmol/L    Chloride 105 98 - 107 mmol/L    CO2 25 22 - 31 mmol/L    Anion Gap, Calculation 11 5 - 18 mmol/L    Glucose 97 70 - 125 mg/dL    BUN 12 8 - 22 mg/dL    Creatinine 0.87 0.70 - 1.30 mg/dL    GFR MDRD Af Amer >60 >60 mL/min/1.73m2    GFR MDRD Non Af Amer >60 >60 mL/min/1.73m2    Bilirubin, Total 0.9 0.0 - 1.0 mg/dL    Calcium 9.1 8.5 - 10.5 mg/dL    Protein, Total 6.6 6.0 - 8.0 g/dL    Albumin 3.9 3.5 - 5.0 g/dL    Alkaline Phosphatase 69 45 - 120 U/L    AST 21 0 - 40 U/L    ALT 36 0 - 45 U/L   Lipid Profile   Result Value Ref Range    Triglycerides 259 (H) <=149 mg/dL    Cholesterol 160 <=199 mg/dL    LDL Calculated 80 <=129 mg/dL    HDL Cholesterol 28 (L) >=40 mg/dL    Patient Fasting > 8hrs? No    Glycosylated Hemoglobin A1c   Result Value Ref Range    Hemoglobin A1c 5.2 3.5 - 6.0 %

## 2022-01-17 ENCOUNTER — OFFICE VISIT (OUTPATIENT)
Dept: FAMILY MEDICINE | Facility: CLINIC | Age: 67
End: 2022-01-17
Payer: MEDICARE

## 2022-01-17 VITALS
HEIGHT: 70 IN | SYSTOLIC BLOOD PRESSURE: 149 MMHG | BODY MASS INDEX: 45.1 KG/M2 | RESPIRATION RATE: 20 BRPM | WEIGHT: 315 LBS | HEART RATE: 63 BPM | DIASTOLIC BLOOD PRESSURE: 87 MMHG

## 2022-01-17 DIAGNOSIS — Z00.00 ENCOUNTER FOR MEDICARE ANNUAL WELLNESS EXAM: Primary | ICD-10-CM

## 2022-01-17 DIAGNOSIS — R22.9 MULTIPLE SKIN NODULES: ICD-10-CM

## 2022-01-17 DIAGNOSIS — I10 ESSENTIAL HYPERTENSION: ICD-10-CM

## 2022-01-17 DIAGNOSIS — E66.01 MORBID OBESITY (H): ICD-10-CM

## 2022-01-17 LAB
ANION GAP SERPL CALCULATED.3IONS-SCNC: 10 MMOL/L (ref 5–18)
BUN SERPL-MCNC: 11 MG/DL (ref 8–22)
CALCIUM SERPL-MCNC: 9.1 MG/DL (ref 8.5–10.5)
CHLORIDE BLD-SCNC: 105 MMOL/L (ref 98–107)
CHOLEST SERPL-MCNC: 164 MG/DL
CO2 SERPL-SCNC: 24 MMOL/L (ref 22–31)
CREAT SERPL-MCNC: 0.81 MG/DL (ref 0.7–1.3)
FASTING STATUS PATIENT QL REPORTED: ABNORMAL
GFR SERPL CREATININE-BSD FRML MDRD: >90 ML/MIN/1.73M2
GLUCOSE BLD-MCNC: 103 MG/DL (ref 70–125)
HDLC SERPL-MCNC: 28 MG/DL
LDLC SERPL CALC-MCNC: 86 MG/DL
POTASSIUM BLD-SCNC: 4.6 MMOL/L (ref 3.5–5)
SODIUM SERPL-SCNC: 139 MMOL/L (ref 136–145)
TRIGL SERPL-MCNC: 250 MG/DL

## 2022-01-17 PROCEDURE — 99207 PR INITIAL PREVENTIVE EXAM STAT: CPT | Performed by: FAMILY MEDICINE

## 2022-01-17 PROCEDURE — 80061 LIPID PANEL: CPT | Performed by: FAMILY MEDICINE

## 2022-01-17 PROCEDURE — 99213 OFFICE O/P EST LOW 20 MIN: CPT | Performed by: FAMILY MEDICINE

## 2022-01-17 PROCEDURE — 36415 COLL VENOUS BLD VENIPUNCTURE: CPT | Performed by: FAMILY MEDICINE

## 2022-01-17 PROCEDURE — 80048 BASIC METABOLIC PNL TOTAL CA: CPT | Performed by: FAMILY MEDICINE

## 2022-01-17 RX ORDER — ATENOLOL 25 MG/1
25 TABLET ORAL DAILY
Qty: 90 TABLET | Refills: 1 | Status: SHIPPED | OUTPATIENT
Start: 2022-01-17 | End: 2022-07-26

## 2022-01-17 RX ORDER — HYDROCHLOROTHIAZIDE 12.5 MG/1
12.5 TABLET ORAL DAILY
Qty: 90 TABLET | Refills: 1 | Status: SHIPPED | OUTPATIENT
Start: 2022-01-17 | End: 2022-07-26

## 2022-01-17 ASSESSMENT — ACTIVITIES OF DAILY LIVING (ADL): CURRENT_FUNCTION: NO ASSISTANCE NEEDED

## 2022-01-17 ASSESSMENT — MIFFLIN-ST. JEOR: SCORE: 2295.8

## 2022-01-17 NOTE — LETTER
January 17, 2022      Miguel Baca  [address] [facility] [address]   [place] [address]        Dear ,    We are writing to inform you of your test results.    Labs are stable - kidneys are normal.  Blood sugar is okay.  Your cholesterol numbers are okay - except your triglycerides are high (this can come from sweets/alcohols - watch the diet).      Resulted Orders   Basic metabolic panel  (Ca, Cl, CO2, Creat, Gluc, K, Na, BUN)   Result Value Ref Range    Sodium 139 136 - 145 mmol/L    Potassium 4.6 3.5 - 5.0 mmol/L    Chloride 105 98 - 107 mmol/L    Carbon Dioxide (CO2) 24 22 - 31 mmol/L    Anion Gap 10 5 - 18 mmol/L    Urea Nitrogen 11 8 - 22 mg/dL    Creatinine 0.81 0.70 - 1.30 mg/dL    Calcium 9.1 8.5 - 10.5 mg/dL    Glucose 103 70 - 125 mg/dL    GFR Estimate >90 >60 mL/min/1.73m2      Comment:      Effective December 21, 2021 eGFRcr in adults is calculated using the 2021 CKD-EPI creatinine equation which includes age and gender (Brittany et al., NEJM, DOI: 10.1056/QPMUex3145113)   Lipid Profile (Chol, Trig, HDL, LDL calc)   Result Value Ref Range    Cholesterol 164 <=199 mg/dL    Triglycerides 250 (H) <=149 mg/dL    Direct Measure HDL 28 (L) >=40 mg/dL      Comment:      HDL Cholesterol Reference Range:     0-2 years:   No reference ranges established for patients under 2 years old  at [facility] for lipid analytes.    2-8 years:  Greater than 45 mg/dL     18 years and older:   Female: Greater than or equal to 50 mg/dL   Male:   Greater than or equal to 40 mg/dL    LDL Cholesterol Calculated 86 <=129 mg/dL    Patient Fasting > 8hrs? Unknown        If you have any questions or concerns, please call the clinic at the number listed above.       Sincerely,      Yahaira Fernandez MD

## 2022-01-17 NOTE — PROGRESS NOTES
"SUBJECTIVE:   Miguel Baca is a 66 year old male who presents for Preventive Visit.      Patient has been advised of split billing requirements and indicates understanding: Yes   Are you in the first 12 months of your Medicare coverage?  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  Good    Frequency of exercise:  4-5 days/week    Duration of exercise:  Less than 15 minutes    Do you usually eat at least 4 servings of fruit and vegetables a day, include whole grains    & fiber and avoid regularly eating high fat or \"junk\" foods?  No    Taking medications regularly:  Yes    Barriers to taking medications:  None    Medication side effects:  None    Ability to successfully perform activities of daily living:  No assistance needed    Home Safety:  No safety concerns identified    Hearing Impairment:  No hearing concerns    In the past 6 months, have you been bothered by leaking of urine?  No    In general, how would you rate your overall mental or emotional health?  Excellent      PHQ-2 Total Score: 0    Do you feel safe in your environment? Yes    Have you ever done Advance Care Planning? (For example, a Health Directive, POLST, or a discussion with a medical provider or your loved ones about your wishes): No, advance care planning information given to patient to review.  Patient declined advance care planning discussion at this time.       Fall risk  Fallen 2 or more times in the past year?: No  Any fall with injury in the past year?: No    Cognitive Screening   1) Repeat 3 items (Leader, Season, Table)    2) Clock draw: NORMAL  3) 3 item recall: Recalls 1 object   Results: NORMAL clock, 1-2 items recalled: COGNITIVE IMPAIRMENT LESS LIKELY    Mini-CogTM Copyright GIANA Hughes. Licensed by the author for use in [facility]; reprinted with permission (qasim@.[facility]). " All rights reserved.      Do you have sleep apnea, excessive snoring or daytime drowsiness?: no    Reviewed and updated as needed this visit by clinical staff  Tobacco  Allergies  Meds  Problems  Med Hx  Surg Hx  Fam Hx         Reviewed and updated as needed this visit by Provider  Tobacco  Allergies  Meds  Problems  Med Hx  Surg Hx  Fam Hx        Social History     Tobacco Use     Smoking status: Former Smoker     Smokeless tobacco: Never Used   Substance Use Topics     Alcohol use: Not on file     If you drink alcohol do you typically have >3 drinks per day or >7 drinks per week? Yes      Alcohol Use 1/17/2022   Prescreen: >3 drinks/day or >7 drinks/week? Yes   Prescreen: >3 drinks/day or >7 drinks/week? -   AUDIT SCORE  5     AUDIT - Alcohol Use Disorders Identification Test - Reproduced from the World Health Organization Audit 2001 (Second Edition) 1/17/2022   1.  How often do you have a drink containing alcohol? 2 to 3 times a week   2.  How many drinks containing alcohol do you have on a typical day when you are drinking? 3 or 4   3.  How often do you have five or more drinks on one occasion? Less than monthly   4.  How often during the last year have you found that you were not able to stop drinking once you had started? Never   5.  How often during the last year have you failed to do what was normally expected of you because of drinking? Never   6.  How often during the last year have you needed a first drink in the morning to get yourself going after a heavy drinking session? Never   7.  How often during the last year have you had a feeling of guilt or remorse after drinking? Never   8.  How often during the last year have you been unable to remember what happened the night before because of your drinking? Never   9.  Have you or someone else been injured because of your drinking? No   10. Has a relative, friend, doctor or other health care worker been concerned about your drinking or  suggested you cut down? No   TOTAL SCORE 5         Current providers sharing in care for this patient include:     Patient Care Team:  No Ref-Primary, Physician as PCP - Yahaira Antoine MD as Assigned PCP    The following health maintenance items are reviewed in Epic and correct as of today:  Health Maintenance Due   Topic Date Due     ANNUAL REVIEW OF  ORDERS  Never done     ZOSTER IMMUNIZATION (1 of 2) Never done     LUNG CANCER SCREENING  Never done     Pneumococcal Vaccine: 65+ Years (1 of 1 - PPSV23) Never done     AORTIC ANEURYSM SCREENING (SYSTEM ASSIGNED)  Never done     INFLUENZA VACCINE (1) 09/01/2021     COVID-19 Vaccine (3 - Booster for Moderna series) 10/12/2021     BP Readings from Last 3 Encounters:   01/17/22 (!) 149/87   10/20/20 136/85    Wt Readings from Last 3 Encounters:   01/17/22 (!) 152 kg (335 lb)   10/20/20 137.9 kg (304 lb)   02/19/19 146.7 kg (323 lb 8 oz)                  Patient Active Problem List   Diagnosis     Morbid obesity (H)     Essential Hypertension     Seborrheic Keratosis     Knee Sprain     Primary osteoarthritis of both knees     Past Surgical History:   Procedure Laterality Date     ARTHROSCOPY KNEE Left 2012       Social History     Tobacco Use     Smoking status: Former Smoker     Smokeless tobacco: Never Used   Substance Use Topics     Alcohol use: Not on file     Family History   Problem Relation Age of Onset     Coronary Artery Disease Father         s/p stents, CABG     Coronary Artery Disease Brother         s/p stents     Cancer Mother      Parkinsonism Paternal Grandmother      Diabetes Type 2  Paternal Grandmother          Current Outpatient Medications   Medication Sig Dispense Refill     aspirin 81 MG EC tablet [ASPIRIN 81 MG EC TABLET] Take 81 mg by mouth daily.       atenolol (TENORMIN) 25 MG tablet Take 1 tablet (25 mg) by mouth daily 90 tablet 1     hydrochlorothiazide (HYDRODIURIL) 12.5 MG tablet Take 1 tablet (12.5 mg) by mouth  "daily 90 tablet 1     No Known Allergies  Reviewed HM    Review of Systems   Constitutional: Negative for chills and fever.   Respiratory: Negative for shortness of breath.    Gastrointestinal: Negative for abdominal pain.   Musculoskeletal: Positive for arthralgias.   All other systems reviewed and are negative.        OBJECTIVE:   BP (!) 149/87 (BP Location: Left arm, Patient Position: Sitting, Cuff Size: Adult Large)   Pulse 63   Resp 20   Ht 1.77 m (5' 9.69\")   Wt (!) 152 kg (335 lb)   BMI 48.50 kg/m   Estimated body mass index is 48.5 kg/m  as calculated from the following:    Height as of this encounter: 1.77 m (5' 9.69\").    Weight as of this encounter: 152 kg (335 lb).  Physical Exam  Constitutional:       General: He is not in acute distress.     Appearance: He is well-developed. He is obese.   HENT:      Right Ear: Tympanic membrane and external ear normal.      Left Ear: Tympanic membrane and external ear normal.      Nose: Nose normal.      Mouth/Throat:      Mouth: No oral lesions.      Pharynx: No oropharyngeal exudate.   Eyes:      General:         Right eye: No discharge.         Left eye: No discharge.      Conjunctiva/sclera: Conjunctivae normal.      Pupils: Pupils are equal, round, and reactive to light.   Neck:      Thyroid: No thyromegaly.      Trachea: No tracheal deviation.   Cardiovascular:      Rate and Rhythm: Normal rate and regular rhythm.      Pulses: Normal pulses.      Heart sounds: Normal heart sounds, S1 normal and S2 normal. No murmur heard.  No S3 or S4 sounds.    Pulmonary:      Effort: Pulmonary effort is normal. No respiratory distress.      Breath sounds: Normal breath sounds. No wheezing or rales.   Abdominal:      General: Bowel sounds are normal.      Palpations: Abdomen is soft. There is no mass.      Tenderness: There is no abdominal tenderness.   Musculoskeletal:         General: No deformity. Normal range of motion.      Cervical back: Neck supple. "   Lymphadenopathy:      Cervical: No cervical adenopathy.   Skin:     General: Skin is warm and dry.      Findings: Rash present. No lesion. Rash is nodular.          Neurological:      General: No focal deficit present.      Mental Status: He is alert and oriented to person, place, and time.      Motor: No abnormal muscle tone.      Deep Tendon Reflexes: Reflexes are normal and symmetric.   Psychiatric:         Speech: Speech normal.         Thought Content: Thought content normal.         Judgment: Judgment normal.           Diagnostic Test Results:  Labs reviewed in Epic  Results for orders placed or performed in visit on 01/17/22   Basic metabolic panel  (Ca, Cl, CO2, Creat, Gluc, K, Na, BUN)     Status: Normal   Result Value Ref Range    Sodium 139 136 - 145 mmol/L    Potassium 4.6 3.5 - 5.0 mmol/L    Chloride 105 98 - 107 mmol/L    Carbon Dioxide (CO2) 24 22 - 31 mmol/L    Anion Gap 10 5 - 18 mmol/L    Urea Nitrogen 11 8 - 22 mg/dL    Creatinine 0.81 0.70 - 1.30 mg/dL    Calcium 9.1 8.5 - 10.5 mg/dL    Glucose 103 70 - 125 mg/dL    GFR Estimate >90 >60 mL/min/1.73m2   Lipid Profile (Chol, Trig, HDL, LDL calc)     Status: Abnormal   Result Value Ref Range    Cholesterol 164 <=199 mg/dL    Triglycerides 250 (H) <=149 mg/dL    Direct Measure HDL 28 (L) >=40 mg/dL    LDL Cholesterol Calculated 86 <=129 mg/dL    Patient Fasting > 8hrs? Unknown        ASSESSMENT / PLAN:   1. Encounter for Medicare annual wellness exam  This is a 65 yo male here for AWV - no specific concerns today -   Reviewed     2. Essential hypertension  Blood pressure still elevated - will add hydrochlorothiazide - needs recheck for BP follow up  - atenolol (TENORMIN) 25 MG tablet; Take 1 tablet (25 mg) by mouth daily  Dispense: 90 tablet; Refill: 1  - hydrochlorothiazide (HYDRODIURIL) 12.5 MG tablet; Take 1 tablet (12.5 mg) by mouth daily  Dispense: 90 tablet; Refill: 1  - Basic metabolic panel  (Ca, Cl, CO2, Creat, Gluc, K, Na, BUN);  "Future  - Lipid Profile (Chol, Trig, HDL, LDL calc); Future  - Basic metabolic panel  (Ca, Cl, CO2, Creat, Gluc, K, Na, BUN)  - Lipid Profile (Chol, Trig, HDL, LDL calc)    3. Morbid obesity (H)  Discussed diet/exercise    4. Multiple skin nodules  Nodular skin rash especially on forearms   - Adult Dermatology Referral; Future      Patient has been advised of split billing requirements and indicates understanding: Yes  COUNSELING:  Reviewed preventive health counseling, as reflected in patient instructions       Regular exercise       Healthy diet/nutrition    Estimated body mass index is 48.5 kg/m  as calculated from the following:    Height as of this encounter: 1.77 m (5' 9.69\").    Weight as of this encounter: 152 kg (335 lb).    Weight management plan: Discussed healthy diet and exercise guidelines    He reports that he has quit smoking. He has never used smokeless tobacco.      Appropriate preventive services were discussed with this patient, including applicable screening as appropriate for cardiovascular disease, diabetes, osteopenia/osteoporosis, and glaucoma.  As appropriate for age/gender, discussed screening for colorectal cancer, prostate cancer, breast cancer, and cervical cancer. Checklist reviewing preventive services available has been given to the patient.    Reviewed patients plan of care and provided an AVS. The Basic Care Plan (routine screening as documented in Health Maintenance) for [place] meets the Care Plan requirement. This Care Plan has been established and reviewed with the Patient.    Counseling Resources:  ATP IV Guidelines  Pooled Cohorts Equation Calculator  Breast Cancer Risk Calculator  Breast Cancer: Medication to Reduce Risk  FRAX Risk Assessment  ICSI Preventive Guidelines  Dietary Guidelines for Americans, 2010  [employer] Consumer Services's MyPlate  ASA Prophylaxis  Lung CA Screening    MARLYN SAAVEDRA MD  [facility]    Identified Health Risks:  "

## 2022-01-17 NOTE — PROGRESS NOTES
"SUBJECTIVE:   CC: Miguel Baca is an 66 year old male who presents for preventative health visit.     {Split Bill scripting  The purpose of this visit is to discuss your medical history and prevent health problems before you are sick. You may be responsible for a co-pay, coinsurance, or deductible if your visit today includes services such as checking on a sore throat, having an x-ray or lab test, or treating and evaluating a new or existing condition :569547}  Patient has been advised of split billing requirements and indicates understanding: {Yes and No:371249}  Healthy Habits:     In general, how would you rate your overall health?  Good    Frequency of exercise:  4-5 days/week    Duration of exercise:  Less than 15 minutes    Do you usually eat at least 4 servings of fruit and vegetables a day, include whole grains    & fiber and avoid regularly eating high fat or \"junk\" foods?  No    Taking medications regularly:  Yes    Barriers to taking medications:  None    Medication side effects:  None    Ability to successfully perform activities of daily living:  No assistance needed    Home Safety:  No safety concerns identified    Hearing Impairment:  No hearing concerns    In the past 6 months, have you been bothered by leaking of urine?  No    In general, how would you rate your overall mental or emotional health?  Excellent      PHQ-2 Total Score: 0    Additional concerns today:  Yes    {Add if <65 person on Medicare  - Required Questions (Optional):171291}  {Outside tests to abstract? :924354}    {additional problems to add (Optional):972092}    Today's PHQ-2 Score:   PHQ-2 ( 1999 [employer]) 1/17/2022   Q1: Little interest or pleasure in doing things 0   Q2: Feeling down, depressed or hopeless 0   PHQ-2 Score 0   Q1: Little interest or pleasure in doing things Not at all   Q2: Feeling down, depressed or hopeless Not at all   PHQ-2 Score 0       Abuse: Current or Past(Physical, Sexual or Emotional)- { :690864}  Do " "you feel safe in your environment? { :424519}    Have you ever done Advance Care Planning? (For example, a Health Directive, POLST, or a discussion with a medical provider or your loved ones about your wishes): { :451454}    Social History     Tobacco Use     Smoking status: Former Smoker     Smokeless tobacco: Never Used   Substance Use Topics     Alcohol use: Not on file     {Rooming Staff- Complete this question if Prescreen response is not shown below for today's visit. If you drink alcohol do you typically have >3 drinks per day or >7 drinks per week? (Optional):220253}    Alcohol Use 1/17/2022   Prescreen: >3 drinks/day or >7 drinks/week? Yes   AUDIT SCORE  5   {add AUDIT responses (Optional) (A score of 7 for adult men is an indication of hazardous drinking; a score of 8 or more is an indication of an alcohol use disorder.  A score of 7 or more for adult women is an indication of hazardous drinking or an alchohol use disorder):112127}    Last PSA: No results found for: PSA    Reviewed orders with patient. Reviewed health maintenance and updated orders accordingly - { :452155::\"Yes\"}  {Chronicprobdata (optional):845889}    Reviewed and updated as needed this visit by clinical staff  Tobacco  Allergies  Meds             Reviewed and updated as needed this visit by Provider               {HISTORY OPTIONS (Optional):506741}    Review of Systems  {MALE ROS (Optional):369751::\"CONSTITUTIONAL: NEGATIVE for fever, chills, change in weight\",\"INTEGUMENTARY/SKIN: NEGATIVE for worrisome rashes, moles or lesions\",\"EYES: NEGATIVE for vision changes or irritation\",\"ENT: NEGATIVE for ear, mouth and throat problems\",\"RESP: NEGATIVE for significant cough or SOB\",\"CV: NEGATIVE for chest pain, palpitations or peripheral edema\",\"GI: NEGATIVE for nausea, abdominal pain, heartburn, or change in bowel habits\",\" male: negative for dysuria, hematuria, decreased urinary stream, erectile dysfunction, urethral " "discharge\",\"MUSCULOSKELETAL: NEGATIVE for significant arthralgias or myalgia\",\"NEURO: NEGATIVE for weakness, dizziness or paresthesias\",\"PSYCHIATRIC: NEGATIVE for changes in mood or affect\"}    OBJECTIVE:   BP (!) 149/87 (BP Location: Left arm, Patient Position: Sitting, Cuff Size: Adult Large)   Pulse 63   Resp 20   Wt (!) 152 kg (335 lb)   BMI 50.94 kg/m      Physical Exam  {Exam Choices (Optional):263771}    {Diagnostic Test Results (Optional):800772::\"Diagnostic Test Results:\",\"Labs reviewed in Epic\"}    ASSESSMENT/PLAN:   {Diag Picklist:176202}    Patient has been advised of split billing requirements and indicates understanding: {YES / NO:842959::\"Yes\"}  COUNSELING:   {MALE COUNSELING MESSAGES:544798::\"Reviewed preventive health counseling, as reflected in patient instructions\"}    Estimated body mass index is 50.94 kg/m  as calculated from the following:    Height as of 10/20/20: 1.727 m (5' 8\").    Weight as of this encounter: 152 kg (335 lb).     {Weight Management Plan (ACO) Complete if BMI is abnormal-  Ages 18-64  BMI >24.9.  Age 65+ with BMI <23 or >30 (Optional):310175}    He reports that he has quit smoking. He has never used smokeless tobacco.      Counseling Resources:  ATP IV Guidelines  Pooled Cohorts Equation Calculator  FRAX Risk Assessment  ICSI Preventive Guidelines  Dietary Guidelines for Americans, 2010  USDA's MyPlate  ASA Prophylaxis  Lung CA Screening    MARLYN SAAVEDRA MD  [facility]"

## 2022-01-23 ASSESSMENT — ENCOUNTER SYMPTOMS
FEVER: 0
CHILLS: 0
ARTHRALGIAS: 1
SHORTNESS OF BREATH: 0
ABDOMINAL PAIN: 0

## 2022-01-24 ENCOUNTER — TELEPHONE (OUTPATIENT)
Dept: FAMILY MEDICINE | Facility: CLINIC | Age: 67
End: 2022-01-24
Payer: MEDICARE

## 2022-01-24 NOTE — TELEPHONE ENCOUNTER
Patient needs to come in to do vision screening, at recent [facility] office visit this was not done and it needs to be charted in that visit. Tried to call patient but the voice mail was not set up.       If patient calls back, please have him come back to the clinic to do vision screening.

## 2022-01-26 NOTE — TELEPHONE ENCOUNTER
Spoke to pt and relay msg below to pt. Pt refuse to come in for vision screening as he live too far from our clinic. Per pt, he will get done with his eye doctor in town. Completing task.

## 2023-06-28 ENCOUNTER — TELEPHONE (OUTPATIENT)
Dept: FAMILY MEDICINE | Facility: CLINIC | Age: 68
End: 2023-06-28

## 2023-06-28 NOTE — TELEPHONE ENCOUNTER
These meds can not be filled until the appointment comes in for a AWV with Dr. Carreon, he is well overdue for an appointment.     Left VM for patient to schedule appt, okay to schedule appt if patient calls back. #1

## 2023-06-28 NOTE — TELEPHONE ENCOUNTER
Patient stopped by and would like a refill on atenolo and his water pill please send to pharmacy in chart.

## 2023-07-10 ENCOUNTER — OFFICE VISIT (OUTPATIENT)
Dept: FAMILY MEDICINE | Facility: CLINIC | Age: 68
End: 2023-07-10
Payer: MEDICARE

## 2023-07-10 VITALS
BODY MASS INDEX: 46.65 KG/M2 | OXYGEN SATURATION: 91 % | RESPIRATION RATE: 32 BRPM | TEMPERATURE: 97.9 F | SYSTOLIC BLOOD PRESSURE: 129 MMHG | HEIGHT: 69 IN | DIASTOLIC BLOOD PRESSURE: 81 MMHG | WEIGHT: 315 LBS | HEART RATE: 57 BPM

## 2023-07-10 DIAGNOSIS — Z13.6 SCREENING FOR AAA (ABDOMINAL AORTIC ANEURYSM): ICD-10-CM

## 2023-07-10 DIAGNOSIS — Z79.899 OTHER LONG TERM (CURRENT) DRUG THERAPY: ICD-10-CM

## 2023-07-10 DIAGNOSIS — M17.0 PRIMARY OSTEOARTHRITIS OF BOTH KNEES: ICD-10-CM

## 2023-07-10 DIAGNOSIS — Z13.220 LIPID SCREENING: ICD-10-CM

## 2023-07-10 DIAGNOSIS — R60.0 BILATERAL LOWER EXTREMITY EDEMA: ICD-10-CM

## 2023-07-10 DIAGNOSIS — Z23 NEED FOR COVID-19 VACCINE: ICD-10-CM

## 2023-07-10 DIAGNOSIS — Z00.00 ENCOUNTER FOR MEDICARE ANNUAL WELLNESS EXAM: Primary | ICD-10-CM

## 2023-07-10 DIAGNOSIS — I10 ESSENTIAL HYPERTENSION: ICD-10-CM

## 2023-07-10 DIAGNOSIS — E66.01 MORBID OBESITY (H): ICD-10-CM

## 2023-07-10 LAB
ALBUMIN SERPL BCG-MCNC: 4.1 G/DL (ref 3.5–5.2)
ALP SERPL-CCNC: 73 U/L (ref 40–129)
ALT SERPL W P-5'-P-CCNC: 46 U/L (ref 0–70)
ANION GAP SERPL CALCULATED.3IONS-SCNC: 10 MMOL/L (ref 7–15)
AST SERPL W P-5'-P-CCNC: 43 U/L (ref 0–45)
BILIRUB SERPL-MCNC: 0.8 MG/DL
BUN SERPL-MCNC: 10.6 MG/DL (ref 8–23)
CALCIUM SERPL-MCNC: 9 MG/DL (ref 8.8–10.2)
CHLORIDE SERPL-SCNC: 102 MMOL/L (ref 98–107)
CHOLEST SERPL-MCNC: 147 MG/DL
CREAT SERPL-MCNC: 0.82 MG/DL (ref 0.67–1.17)
DEPRECATED HCO3 PLAS-SCNC: 30 MMOL/L (ref 22–29)
ERYTHROCYTE [DISTWIDTH] IN BLOOD BY AUTOMATED COUNT: 14.8 % (ref 10–15)
GFR SERPL CREATININE-BSD FRML MDRD: >90 ML/MIN/1.73M2
GLUCOSE SERPL-MCNC: 97 MG/DL (ref 70–99)
HBA1C MFR BLD: 5.8 % (ref 0–5.6)
HCT VFR BLD AUTO: 53 % (ref 40–53)
HDLC SERPL-MCNC: 26 MG/DL
HGB BLD-MCNC: 16.8 G/DL (ref 13.3–17.7)
LDLC SERPL CALC-MCNC: 79 MG/DL
MCH RBC QN AUTO: 31.1 PG (ref 26.5–33)
MCHC RBC AUTO-ENTMCNC: 31.7 G/DL (ref 31.5–36.5)
MCV RBC AUTO: 98 FL (ref 78–100)
NONHDLC SERPL-MCNC: 121 MG/DL
PLATELET # BLD AUTO: 112 10E3/UL (ref 150–450)
POTASSIUM SERPL-SCNC: 4.9 MMOL/L (ref 3.4–5.3)
PROT SERPL-MCNC: 6.8 G/DL (ref 6.4–8.3)
RBC # BLD AUTO: 5.4 10E6/UL (ref 4.4–5.9)
SODIUM SERPL-SCNC: 142 MMOL/L (ref 136–145)
TRIGL SERPL-MCNC: 208 MG/DL
WBC # BLD AUTO: 7 10E3/UL (ref 4–11)

## 2023-07-10 PROCEDURE — 91313 COVID-19 BIVALENT 18+ (MODERNA): CPT | Performed by: FAMILY MEDICINE

## 2023-07-10 PROCEDURE — 36415 COLL VENOUS BLD VENIPUNCTURE: CPT | Performed by: FAMILY MEDICINE

## 2023-07-10 PROCEDURE — 80061 LIPID PANEL: CPT | Performed by: FAMILY MEDICINE

## 2023-07-10 PROCEDURE — 0134A COVID-19 BIVALENT 18+ (MODERNA): CPT | Performed by: FAMILY MEDICINE

## 2023-07-10 PROCEDURE — 85027 COMPLETE CBC AUTOMATED: CPT | Performed by: FAMILY MEDICINE

## 2023-07-10 PROCEDURE — 80053 COMPREHEN METABOLIC PANEL: CPT | Performed by: FAMILY MEDICINE

## 2023-07-10 PROCEDURE — G0438 PPPS, INITIAL VISIT: HCPCS | Performed by: FAMILY MEDICINE

## 2023-07-10 PROCEDURE — 83036 HEMOGLOBIN GLYCOSYLATED A1C: CPT | Performed by: FAMILY MEDICINE

## 2023-07-10 RX ORDER — ATENOLOL 25 MG/1
25 TABLET ORAL DAILY
Qty: 90 TABLET | Refills: 3 | Status: SHIPPED | OUTPATIENT
Start: 2023-07-10 | End: 2024-08-26

## 2023-07-10 RX ORDER — HYDROCHLOROTHIAZIDE 12.5 MG/1
1 CAPSULE ORAL DAILY
Qty: 90 CAPSULE | Refills: 3 | Status: SHIPPED | OUTPATIENT
Start: 2023-07-10 | End: 2023-11-20

## 2023-07-10 ASSESSMENT — ENCOUNTER SYMPTOMS
DIZZINESS: 0
EYE PAIN: 0
CHILLS: 0
ABDOMINAL PAIN: 0
COUGH: 0
HEMATURIA: 0
DIARRHEA: 0
CONSTIPATION: 0
HEMATOCHEZIA: 0
ROS SKIN COMMENTS: DRY SKIN

## 2023-07-10 ASSESSMENT — ACTIVITIES OF DAILY LIVING (ADL): CURRENT_FUNCTION: NO ASSISTANCE NEEDED

## 2023-07-10 NOTE — LETTER
July 11, 2023      Miguel Liuer  [address]    [place] [address]        Dear ,    We are writing to inform you of your test results.    Your labs look pretty good -   Except your blood sugar numbers (A1c), suggest prediabetes.  Just watch the carbs.  Get some exercise (activity around your home/property).      Resulted Orders   Hemoglobin A1c   Result Value Ref Range    Hemoglobin A1C 5.8 (H) 0.0 - 5.6 %      Comment:      Normal <5.7%   Prediabetes 5.7-6.4%    Diabetes 6.5% or higher     Note: Adopted from ADA consensus guidelines.   Comprehensive metabolic panel (BMP + Alb, Alk Phos, ALT, AST, Total. Bili, TP)   Result Value Ref Range    Sodium 142 136 - 145 mmol/L    Potassium 4.9 3.4 - 5.3 mmol/L    Chloride 102 98 - 107 mmol/L    Carbon Dioxide (CO2) 30 (H) 22 - 29 mmol/L    Anion Gap 10 7 - 15 mmol/L    Urea Nitrogen 10.6 8.0 - 23.0 mg/dL    Creatinine 0.82 0.67 - 1.17 mg/dL    Calcium 9.0 8.8 - 10.2 mg/dL    Glucose 97 70 - 99 mg/dL    Alkaline Phosphatase 73 40 - 129 U/L    AST 43 0 - 45 U/L      Comment:      Reference intervals for this test were updated on 6/12/2023 to more accurately reflect our healthy population. There may be differences in the flagging of prior results with similar values performed with this method. Interpretation of those prior results can be made in the context of the updated reference intervals.    ALT 46 0 - 70 U/L      Comment:      Reference intervals for this test were updated on 6/12/2023 to more accurately reflect our healthy population. There may be differences in the flagging of prior results with similar values performed with this method. Interpretation of those prior results can be made in the context of the updated reference intervals.      Protein Total 6.8 6.4 - 8.3 g/dL    Albumin 4.1 3.5 - 5.2 g/dL    Bilirubin Total 0.8 <=1.2 mg/dL    GFR Estimate >90 >60 mL/min/1.73m2   Lipid Profile (Chol, Trig, HDL, LDL calc)   Result Value Ref Range  Writer talks with MYA Gross on the patients unit.  She was informed of Dr Morales recommendation.  Ginny will call back if concerns rise.       Cholesterol 147 <200 mg/dL    Triglycerides 208 (H) <150 mg/dL    Direct Measure HDL 26 (L) >=40 mg/dL    LDL Cholesterol Calculated 79 <=100 mg/dL    Non HDL Cholesterol 121 <130 mg/dL    Narrative    Cholesterol  Desirable:  <200 mg/dL    Triglycerides  Normal:  Less than 150 mg/dL  Borderline High:  150-199 mg/dL  High:  200-499 mg/dL  Very High:  Greater than or equal to 500 mg/dL    Direct Measure HDL  Female:  Greater than or equal to 50 mg/dL   Male:  Greater than or equal to 40 mg/dL    LDL Cholesterol  Desirable:  <100mg/dL  Above Desirable:  100-129 mg/dL   Borderline High:  130-159 mg/dL   High:  160-189 mg/dL   Very High:  >= 190 mg/dL    Non HDL Cholesterol  Desirable:  130 mg/dL  Above Desirable:  130-159 mg/dL  Borderline High:  160-189 mg/dL  High:  190-219 mg/dL  Very High:  Greater than or equal to 220 mg/dL   CBC with platelets   Result Value Ref Range    WBC Count 7.0 4.0 - 11.0 10e3/uL    RBC Count 5.40 4.40 - 5.90 10e6/uL    Hemoglobin 16.8 13.3 - 17.7 g/dL    Hematocrit 53.0 40.0 - 53.0 %    MCV 98 78 - 100 fL    MCH 31.1 26.5 - 33.0 pg    MCHC 31.7 31.5 - 36.5 g/dL    RDW 14.8 10.0 - 15.0 %    Platelet Count 112 (L) 150 - 450 10e3/uL       If you have any questions or concerns, please call the clinic at the number listed above.       Sincerely,      Yahaira Fernandez MD

## 2023-07-10 NOTE — PROGRESS NOTES
"SUBJECTIVE:   Miguel is a 67 year old who presents for Preventive Visit.      7/10/2023     9:36 AM   Additional Questions   Roomed by Debbie KUO.     Are you in the first 12 months of your Medicare coverage?  No    Still living in [place], [place]  Retired -   [employer] -     No smoker x 30 years -   Does not qualify for lung cancer screening           Healthy Habits:     In general, how would you rate your overall health?  Good    Frequency of exercise:  6-7 days/week    Duration of exercise:  Less than 15 minutes    Do you usually eat at least 4 servings of fruit and vegetables a day, include whole grains    & fiber and avoid regularly eating high fat or \"junk\" foods?  No    Taking medications regularly:  Yes    Medication side effects:  None    Ability to successfully perform activities of daily living:  No assistance needed    Home Safety:  No safety concerns identified    Hearing Impairment:  No hearing concerns    In the past 6 months, have you been bothered by leaking of urine?  No    In general, how would you rate your overall mental or emotional health?  Excellent    Additional concerns today:  No        Have you ever done Advance Care Planning? (For example, a Health Directive, POLST, or a discussion with a medical provider or your loved ones about your wishes): No, advance care planning information given to patient to review.  Patient plans to discuss their wishes with loved ones or provider.         Fall risk  Fallen 2 or more times in the past year?: Yes  Any fall with injury in the past year?: No    Cognitive Screening   1) Repeat 3 items (River, Nation, Finger)    2) Clock draw: NORMAL  3) 3 item recall: Recalls 3 objects  Results: 3 items recalled: COGNITIVE IMPAIRMENT LESS LIKELY    Mini-CogTM Copyright GIANA Hughes. Licensed by the author for use in [facility] [employer]; reprinted with permission (qasim@.[facility]). All rights reserved.          Reviewed and updated as needed this visit by clinical " staff   Tobacco  Allergies  Meds              Reviewed and updated as needed this visit by Provider                 Social History     Tobacco Use     Smoking status: Former     Types: Cigarettes     Smokeless tobacco: Never   Substance Use Topics     Alcohol use: Not on file             7/10/2023     8:40 AM   Alcohol Use   Prescreen: >3 drinks/day or >7 drinks/week? No     Do you have a current opioid prescription? No  Do you use any other controlled substances or medications that are not prescribed by a provider? Alcohol      Current providers sharing in care for this patient include:   Patient Care Team:  Yahaira Fernandez MD as PCP - General (Family Medicine)  Yahaira Fernandez MD as Assigned PCP    The following health maintenance items are reviewed in Epic and correct as of today:  Health Maintenance   Topic Date Due     ZOSTER IMMUNIZATION (1 of 2) Never done     LUNG CANCER SCREENING  Never done     Pneumococcal Vaccine: 65+ Years (1 - PCV) Never done     AORTIC ANEURYSM SCREENING (SYSTEM ASSIGNED)  Never done     MEDICARE ANNUAL WELLNESS VISIT  01/17/2023     INFLUENZA VACCINE (1) 09/01/2023     COVID-19 Vaccine (4 - Moderna series) 11/10/2023     ANNUAL REVIEW OF HM ORDERS  07/10/2024     FALL RISK ASSESSMENT  07/10/2024     COLORECTAL CANCER SCREENING  04/23/2025     DTAP/TDAP/TD IMMUNIZATION (2 - Td or Tdap) 01/19/2028     LIPID  07/10/2028     ADVANCE CARE PLANNING  07/11/2028     HEPATITIS C SCREENING  Completed     PHQ-2 (once per calendar year)  Completed     IPV IMMUNIZATION  Aged Out     MENINGITIS IMMUNIZATION  Aged Out     BP Readings from Last 3 Encounters:   07/10/23 129/81   01/17/22 (!) 149/87   10/20/20 136/85    Wt Readings from Last 3 Encounters:   07/10/23 (!) 155 kg (341 lb 12 oz)   01/17/22 (!) 152 kg (335 lb)   10/20/20 137.9 kg (304 lb)                  Patient Active Problem List   Diagnosis     Morbid obesity (H)     Essential Hypertension      "Seborrheic Keratosis     Knee Sprain     Primary osteoarthritis of both knees     Past Surgical History:   Procedure Laterality Date     ARTHROSCOPY KNEE Left 2012       Social History     Tobacco Use     Smoking status: Former     Types: Cigarettes     Smokeless tobacco: Never   Substance Use Topics     Alcohol use: Not on file     Family History   Problem Relation Age of Onset     Coronary Artery Disease Father         s/p stents, CABG     Coronary Artery Disease Brother         s/p stents     Cancer Mother      Parkinsonism Paternal Grandmother      Diabetes Type 2  Paternal Grandmother          Current Outpatient Medications   Medication Sig Dispense Refill     aspirin 81 MG EC tablet [ASPIRIN 81 MG EC TABLET] Take 81 mg by mouth daily.       atenolol (TENORMIN) 25 MG tablet Take 1 tablet (25 mg) by mouth daily 90 tablet 3     hydrochlorothiazide (MICROZIDE) 12.5 MG capsule Take 1 capsule (12.5 mg) by mouth daily 90 capsule 3     No Known Allergies  Reviewed HM         Review of Systems   Constitutional: Negative for chills.   HENT: Negative for congestion and ear pain.    Eyes: Negative for pain.   Respiratory: Negative for cough.    Cardiovascular: Negative for chest pain.   Gastrointestinal: Negative for abdominal pain, constipation, diarrhea and hematochezia.   Genitourinary: Negative for hematuria.   Musculoskeletal:        Stiffness in joints     Skin:        Dry skin      Neurological: Negative for dizziness.         OBJECTIVE:   /81 (BP Location: Right arm, Patient Position: Sitting, Cuff Size: Thigh)   Pulse 57   Temp 97.9  F (36.6  C)   Resp (!) 32   Ht 1.758 m (5' 9.21\")   Wt (!) 155 kg (341 lb 12 oz)   SpO2 91%   BMI 50.16 kg/m   Estimated body mass index is 50.16 kg/m  as calculated from the following:    Height as of this encounter: 1.758 m (5' 9.21\").    Weight as of this encounter: 155 kg (341 lb 12 oz).  Physical Exam  Vitals reviewed.   Constitutional:       General: He is not in " acute distress.     Appearance: Normal appearance. He is obese.   HENT:      Head: Normocephalic.      Right Ear: Tympanic membrane, ear canal and external ear normal.      Left Ear: Tympanic membrane, ear canal and external ear normal.      Nose: Nose normal.      Mouth/Throat:      Mouth: Mucous membranes are moist.      Pharynx: No posterior oropharyngeal erythema.   Eyes:      Extraocular Movements: Extraocular movements intact.      Conjunctiva/sclera: Conjunctivae normal.      Pupils: Pupils are equal, round, and reactive to light.   Cardiovascular:      Rate and Rhythm: Normal rate and regular rhythm.      Pulses: Normal pulses.      Heart sounds: Normal heart sounds. No murmur heard.  Pulmonary:      Effort: Pulmonary effort is normal.      Breath sounds: Normal breath sounds.   Abdominal:      Palpations: Abdomen is soft. There is no mass.      Tenderness: There is no abdominal tenderness. There is no guarding or rebound.   Genitourinary:     Penis: Normal.       Testes: Normal.   Musculoskeletal:         General: Deformity (DJD  - knees) present. Normal range of motion.      Cervical back: Normal range of motion and neck supple.      Right lower leg: Edema (with chronic vascular changes) present.      Left lower leg: Edema (with chronic vascular changes) present.   Lymphadenopathy:      Cervical: No cervical adenopathy.   Skin:     General: Skin is warm and dry.   Neurological:      General: No focal deficit present.      Mental Status: He is alert and oriented to person, place, and time.      Gait: Gait normal.   Psychiatric:         Mood and Affect: Mood normal.         Behavior: Behavior normal.           Diagnostic Test Results:  Labs reviewed in Epic  Results for orders placed or performed in visit on 07/10/23   Hemoglobin A1c     Status: Abnormal   Result Value Ref Range    Hemoglobin A1C 5.8 (H) 0.0 - 5.6 %   Comprehensive metabolic panel (BMP + Alb, Alk Phos, ALT, AST, Total. Bili, TP)     Status:  Abnormal   Result Value Ref Range    Sodium 142 136 - 145 mmol/L    Potassium 4.9 3.4 - 5.3 mmol/L    Chloride 102 98 - 107 mmol/L    Carbon Dioxide (CO2) 30 (H) 22 - 29 mmol/L    Anion Gap 10 7 - 15 mmol/L    Urea Nitrogen 10.6 8.0 - 23.0 mg/dL    Creatinine 0.82 0.67 - 1.17 mg/dL    Calcium 9.0 8.8 - 10.2 mg/dL    Glucose 97 70 - 99 mg/dL    Alkaline Phosphatase 73 40 - 129 U/L    AST 43 0 - 45 U/L    ALT 46 0 - 70 U/L    Protein Total 6.8 6.4 - 8.3 g/dL    Albumin 4.1 3.5 - 5.2 g/dL    Bilirubin Total 0.8 <=1.2 mg/dL    GFR Estimate >90 >60 mL/min/1.73m2   Lipid Profile (Chol, Trig, HDL, LDL calc)     Status: Abnormal   Result Value Ref Range    Cholesterol 147 <200 mg/dL    Triglycerides 208 (H) <150 mg/dL    Direct Measure HDL 26 (L) >=40 mg/dL    LDL Cholesterol Calculated 79 <=100 mg/dL    Non HDL Cholesterol 121 <130 mg/dL    Narrative    Cholesterol  Desirable:  <200 mg/dL    Triglycerides  Normal:  Less than 150 mg/dL  Borderline High:  150-199 mg/dL  High:  200-499 mg/dL  Very High:  Greater than or equal to 500 mg/dL    Direct Measure HDL  Female:  Greater than or equal to 50 mg/dL   Male:  Greater than or equal to 40 mg/dL    LDL Cholesterol  Desirable:  <100mg/dL  Above Desirable:  100-129 mg/dL   Borderline High:  130-159 mg/dL   High:  160-189 mg/dL   Very High:  >= 190 mg/dL    Non HDL Cholesterol  Desirable:  130 mg/dL  Above Desirable:  130-159 mg/dL  Borderline High:  160-189 mg/dL  High:  190-219 mg/dL  Very High:  Greater than or equal to 220 mg/dL   CBC with platelets     Status: Abnormal   Result Value Ref Range    WBC Count 7.0 4.0 - 11.0 10e3/uL    RBC Count 5.40 4.40 - 5.90 10e6/uL    Hemoglobin 16.8 13.3 - 17.7 g/dL    Hematocrit 53.0 40.0 - 53.0 %    MCV 98 78 - 100 fL    MCH 31.1 26.5 - 33.0 pg    MCHC 31.7 31.5 - 36.5 g/dL    RDW 14.8 10.0 - 15.0 %    Platelet Count 112 (L) 150 - 450 10e3/uL       ASSESSMENT / PLAN:   1. Encounter for Medicare annual wellness exam  This is a 68 yo male  "here for AWV    2. Essential hypertension  Blood pressure is controlled today.  Doing well.    - hydrochlorothiazide (MICROZIDE) 12.5 MG capsule; Take 1 capsule (12.5 mg) by mouth daily  Dispense: 90 capsule; Refill: 3  - atenolol (TENORMIN) 25 MG tablet; Take 1 tablet (25 mg) by mouth daily  Dispense: 90 tablet; Refill: 3  - Comprehensive metabolic panel (BMP + Alb, Alk Phos, ALT, AST, Total. Bili, TP); Future  - Comprehensive metabolic panel (BMP + Alb, Alk Phos, ALT, AST, Total. Bili, TP)    3. Morbid obesity (H)  BMI 50 ; discussed diet/exercise -   - Hemoglobin A1c; Future  - Hemoglobin A1c    4. Screening for AAA (abdominal aortic aneurysm)  Consider AAA screening - patient is not inclined to do this ; will continue to discuss    5. Need for COVID-19 vaccine  Due for COVID-19 booster - discussed - ordered   - COVID-19 BIVALENT 18+ (MODERNA)    6. Primary osteoarthritis of both knees  Bilateral knee DJD - pain / stiffness - does well currently     7. Bilateral lower extremity edema  LE edema - chronic lymphedema - some mild erythema of lower extremities  - CBC with platelets; Future  - CBC with platelets    8. Lipid screening  Due for lipid screening - ordered   - Lipid Profile (Chol, Trig, HDL, LDL calc); Future  - Lipid Profile (Chol, Trig, HDL, LDL calc)    9. Other long term (current) drug therapy  Will check A1c - high risk for diabetes   - Hemoglobin A1c; Future  - Hemoglobin A1c            COUNSELING:  Reviewed preventive health counseling, as reflected in patient instructions       Regular exercise       Healthy diet/nutrition      BMI:   Estimated body mass index is 50.16 kg/m  as calculated from the following:    Height as of this encounter: 1.758 m (5' 9.21\").    Weight as of this encounter: 155 kg (341 lb 12 oz).   Weight management plan: Discussed healthy diet and exercise guidelines      He reports that he has quit smoking. His smoking use included cigarettes. He has never used smokeless " tobacco.      Appropriate preventive services were discussed with this patient, including applicable screening as appropriate for cardiovascular disease, diabetes, osteopenia/osteoporosis, and glaucoma.  As appropriate for age/gender, discussed screening for colorectal cancer, prostate cancer, breast cancer, and cervical cancer. Checklist reviewing preventive services available has been given to the patient.    Reviewed patients plan of care and provided an AVS. The Basic Care Plan (routine screening as documented in Health Maintenance) for Miguel meets the Care Plan requirement. This Care Plan has been established and reviewed with the Patient.      MARLYN SAAVEDRA MD  [facility]    Identified Health Risks:    I have reviewed Opioid Use Disorder and Substance Use Disorder risk factors and made any needed referrals.        Prior to immunization administration, verified patients identity using patient s name and date of birth. Please see Immunization Activity for additional information.     Screening Questionnaire for Adult Immunization    Are you sick today?   No   Do you have allergies to medications, food, a vaccine component or latex?   No   Have you ever had a serious reaction after receiving a vaccination?   No   Do you have a long-term health problem with heart, lung, kidney, or metabolic disease (e.g., diabetes), asthma, a blood disorder, no spleen, complement component deficiency, a cochlear implant, or a spinal fluid leak?  Are you on long-term aspirin therapy?   No   Do you have cancer, leukemia, HIV/AIDS, or any other immune system problem?   No   Do you have a parent, brother, or sister with an immune system problem?   No   In the past 3 months, have you taken medications that affect  your immune system, such as prednisone, other steroids, or anticancer drugs; drugs for the treatment of rheumatoid arthritis, Crohn s disease, or psoriasis; or have you had radiation  treatments?   No   Have you had a seizure, or a brain or other nervous system problem?   No   During the past year, have you received a transfusion of blood or blood    products, or been given immune (gamma) globulin or antiviral drug?   No   For women: Are you pregnant or is there a chance you could become       pregnant during the next month?   No   Have you received any vaccinations in the past 4 weeks?   No     Immunization questionnaire answers were all negative.        Screening performed by Debbie Centeno on 7/10/2023 at 9:40 AM.

## 2023-11-20 ENCOUNTER — OFFICE VISIT (OUTPATIENT)
Dept: FAMILY MEDICINE | Facility: CLINIC | Age: 68
End: 2023-11-20
Payer: MEDICARE

## 2023-11-20 VITALS
SYSTOLIC BLOOD PRESSURE: 107 MMHG | RESPIRATION RATE: 24 BRPM | HEART RATE: 74 BPM | OXYGEN SATURATION: 94 % | BODY MASS INDEX: 44.14 KG/M2 | TEMPERATURE: 97.6 F | HEIGHT: 69 IN | WEIGHT: 298 LBS | DIASTOLIC BLOOD PRESSURE: 74 MMHG

## 2023-11-20 DIAGNOSIS — I26.99 BILATERAL PULMONARY EMBOLISM (H): ICD-10-CM

## 2023-11-20 DIAGNOSIS — T36.95XA: ICD-10-CM

## 2023-11-20 DIAGNOSIS — I10 ESSENTIAL HYPERTENSION: ICD-10-CM

## 2023-11-20 DIAGNOSIS — Z09 HOSPITAL DISCHARGE FOLLOW-UP: Primary | ICD-10-CM

## 2023-11-20 DIAGNOSIS — J96.02 ACUTE RESPIRATORY FAILURE WITH HYPERCAPNIA (H): ICD-10-CM

## 2023-11-20 DIAGNOSIS — J18.9 PNEUMONIA OF LEFT LOWER LOBE DUE TO INFECTIOUS ORGANISM: ICD-10-CM

## 2023-11-20 LAB
ANION GAP SERPL CALCULATED.3IONS-SCNC: 12 MMOL/L (ref 7–15)
BUN SERPL-MCNC: 17.5 MG/DL (ref 8–23)
CALCIUM SERPL-MCNC: 9.7 MG/DL (ref 8.8–10.2)
CHLORIDE SERPL-SCNC: 96 MMOL/L (ref 98–107)
CREAT SERPL-MCNC: 0.99 MG/DL (ref 0.67–1.17)
DEPRECATED HCO3 PLAS-SCNC: 27 MMOL/L (ref 22–29)
EGFRCR SERPLBLD CKD-EPI 2021: 82 ML/MIN/1.73M2
GLUCOSE SERPL-MCNC: 95 MG/DL (ref 70–99)
POTASSIUM SERPL-SCNC: 4.7 MMOL/L (ref 3.4–5.3)
SODIUM SERPL-SCNC: 135 MMOL/L (ref 135–145)

## 2023-11-20 PROCEDURE — 36415 COLL VENOUS BLD VENIPUNCTURE: CPT | Performed by: FAMILY MEDICINE

## 2023-11-20 PROCEDURE — 80048 BASIC METABOLIC PNL TOTAL CA: CPT | Performed by: FAMILY MEDICINE

## 2023-11-20 PROCEDURE — 99495 TRANSJ CARE MGMT MOD F2F 14D: CPT | Performed by: FAMILY MEDICINE

## 2023-11-20 RX ORDER — BUMETANIDE 1 MG/1
1 TABLET ORAL 2 TIMES DAILY
COMMUNITY
Start: 2023-11-16 | End: 2023-12-15

## 2023-11-20 RX ORDER — SPIRONOLACTONE 50 MG/1
1 TABLET, FILM COATED ORAL EVERY MORNING
COMMUNITY
Start: 2023-11-17 | End: 2023-12-15

## 2023-11-20 NOTE — LETTER
November 22, 2023      Miguel Baca  [address]   [place] [address]        Dear ,    We are writing to inform you of your test results.    Labs look good.      Resulted Orders   Basic metabolic panel  (Ca, Cl, CO2, Creat, Gluc, K, Na, BUN)   Result Value Ref Range    Sodium 135 135 - 145 mmol/L      Comment:      Reference intervals for this test were updated on 09/26/2023 to more accurately reflect our healthy population. There may be differences in the flagging of prior results with similar values performed with this method. Interpretation of those prior results can be made in the context of the updated reference intervals.     Potassium 4.7 3.4 - 5.3 mmol/L    Chloride 96 (L) 98 - 107 mmol/L    Carbon Dioxide (CO2) 27 22 - 29 mmol/L    Anion Gap 12 7 - 15 mmol/L    Urea Nitrogen 17.5 8.0 - 23.0 mg/dL    Creatinine 0.99 0.67 - 1.17 mg/dL    GFR Estimate 82 >60 mL/min/1.73m2    Calcium 9.7 8.8 - 10.2 mg/dL    Glucose 95 70 - 99 mg/dL       If you have any questions or concerns, please call the clinic at the number listed above.       Sincerely,      Yahaira Fernandez MD

## 2023-11-20 NOTE — PROGRESS NOTES
"  1. Hospital discharge follow-up  2. Bilateral pulmonary embolism (H)  3. Anaphylaxis due to antibacterial agent  4. Acute respiratory failure with hypercapnia (H)  6. Pneumonia of left lower lobe due to infectious organism  This is a 69 yo male who apparently developed worsening respiratory status at home - weak, cough, chest pain.  Was brought to a local hospital with respiratory failure - given Ceftriaxone and had anaphylaxis.  This led to transport to [facility] emergently and treatment.  Evaluation revealed sepsis, bilateral pulmonary emboli, some congestive heart failure.  Has returned to home - feeling better, swelling is improved.  Needs recheck on BMP.    I have reviewed available hospital records, consults, notes, discharge summary as well as imaging and lab results.  In addition I have reviewed her medication list and made any adjustments as indicated in orders.      Will see him back in 1 month.      5. Essential hypertension  Blood pressure is stable - no symptoms.  Check BMP  - Basic metabolic panel  (Ca, Cl, CO2, Creat, Gluc, K, Na, BUN); Future  - Basic metabolic panel  (Ca, Cl, CO2, Creat, Gluc, K, Na, BUN)          Yazmin Rivera is a 68 year old, presenting for the following health issues:  Hospital F/U        11/20/2023     3:20 PM   Additional Questions   Roomed by Erica   Accompanied by Friend         11/20/2023     3:20 PM   Patient Reported Additional Medications   Patient reports taking the following new medications bumetanide 1 mg tablet BID, spironolactone 50 mg tablet daily, Eliquis       Sick x 5 weeks in a row -   Thought it was COVID  Then, felt better and would \"catch it again\"  Slept until 12:30 pm -   Went to clinic - and had anaphylactic reaction to Ceftriaxone -   Got airlifted from [place] to [facility]   In hospital x 3 weeks - was full of blood clots -   Got lots of IV -   Needed irrigation of bladder - still has catheter   Got oxygen machine -   Back home " in [place]    Has Urology follow up  Has sleep apnea follow up     Has nurse coming to house twice a week   Would fall below 59% at night -     Went home on Friday - October 17 -              Hospital Follow-up Visit:    Hospital/Nursing Home/IP Rehab Facility:  [facility]  Date of Admission: 10/29/23  Date of Discharge: 11/16/23  Reason(s) for Admission: Pneumonia    Was your hospitalization related to COVID-19? No   Problems taking medications regularly:  None  Medication changes since discharge: Stopped Hydrochlorothiazide and ASA, started on Eliquis and Spironolactone and Bumex.   Problems adhering to non-medication therapy:  None    Summary of hospitalization:  [facility] discharge summary reviewed  Diagnostic Tests/Treatments reviewed.  Follow up needed: none  Other Healthcare Providers Involved in Patient s Care:         Specialist appointment - sleep apnea,Urology   Update since discharge: improved.         Plan of care communicated with patient           Review of Systems   Constitutional:  Positive for activity change, fatigue and unexpected weight change. Negative for chills and fever.   HENT:  Negative for congestion, ear pain, hearing loss and sore throat.    Eyes:  Negative for pain and visual disturbance.   Respiratory:  Positive for shortness of breath (improved). Negative for cough.    Cardiovascular:  Negative for chest pain, palpitations and peripheral edema.   Gastrointestinal:  Negative for abdominal pain, constipation and diarrhea.   Endocrine: Negative for polyuria.   Genitourinary:  Negative for dysuria and frequency.   Musculoskeletal:  Negative for arthralgias and myalgias.   Skin:  Negative for rash.   Allergic/Immunologic: Negative.    Neurological:  Negative for dizziness, weakness, headaches and paresthesias.   Hematological:  Does not bruise/bleed easily.   Psychiatric/Behavioral:  Negative for mood changes. The patient is not nervous/anxious.    All other  "systems reviewed and are negative.           Objective    /74 (BP Location: Right arm, Patient Position: Sitting, Cuff Size: Adult Large)   Pulse 74   Temp 97.6  F (36.4  C) (Temporal)   Resp 24   Ht 1.75 m (5' 8.9\")   Wt 135.2 kg (298 lb)   SpO2 94%   BMI 44.14 kg/m    Body mass index is 44.14 kg/m .  Physical Exam  Vitals reviewed.   Constitutional:       General: He is not in acute distress.     Appearance: Normal appearance.   HENT:      Head: Normocephalic.      Right Ear: Tympanic membrane, ear canal and external ear normal.      Left Ear: Tympanic membrane, ear canal and external ear normal.      Nose: Nose normal.      Mouth/Throat:      Mouth: Mucous membranes are moist.      Pharynx: No posterior oropharyngeal erythema.   Eyes:      Extraocular Movements: Extraocular movements intact.      Conjunctiva/sclera: Conjunctivae normal.      Pupils: Pupils are equal, round, and reactive to light.   Cardiovascular:      Rate and Rhythm: Normal rate and regular rhythm.      Pulses: Normal pulses.      Heart sounds: Normal heart sounds. No murmur heard.  Pulmonary:      Effort: Pulmonary effort is normal. No respiratory distress.      Breath sounds: Rhonchi (occasional) present.   Abdominal:      Palpations: Abdomen is soft. There is no mass.      Tenderness: There is no abdominal tenderness. There is no guarding or rebound.   Musculoskeletal:         General: No deformity. Normal range of motion.      Cervical back: Normal range of motion and neck supple.   Lymphadenopathy:      Cervical: No cervical adenopathy.   Skin:     General: Skin is warm and dry.   Neurological:      General: No focal deficit present.      Mental Status: He is alert and oriented to person, place, and time.   Psychiatric:         Mood and Affect: Mood normal.         Behavior: Behavior normal.            Results for orders placed or performed in visit on 11/20/23   Basic metabolic panel  (Ca, Cl, CO2, Creat, Gluc, K, Na, BUN)    "  Status: Abnormal   Result Value Ref Range    Sodium 135 135 - 145 mmol/L    Potassium 4.7 3.4 - 5.3 mmol/L    Chloride 96 (L) 98 - 107 mmol/L    Carbon Dioxide (CO2) 27 22 - 29 mmol/L    Anion Gap 12 7 - 15 mmol/L    Urea Nitrogen 17.5 8.0 - 23.0 mg/dL    Creatinine 0.99 0.67 - 1.17 mg/dL    GFR Estimate 82 >60 mL/min/1.73m2    Calcium 9.7 8.8 - 10.2 mg/dL    Glucose 95 70 - 99 mg/dL             Prior to immunization administration, verified patients identity using patient s name and date of birth. Please see Immunization Activity for additional information.     Screening Questionnaire for Adult Immunization    Are you sick today?   No   Do you have allergies to medications, food, a vaccine component or latex?   Yes   Have you ever had a serious reaction after receiving a vaccination?   No   Do you have a long-term health problem with heart, lung, kidney, or metabolic disease (e.g., diabetes), asthma, a blood disorder, no spleen, complement component deficiency, a cochlear implant, or a spinal fluid leak?  Are you on long-term aspirin therapy?   No   Do you have cancer, leukemia, HIV/AIDS, or any other immune system problem?   No   Do you have a parent, brother, or sister with an immune system problem?   Yes   In the past 3 months, have you taken medications that affect  your immune system, such as prednisone, other steroids, or anticancer drugs; drugs for the treatment of rheumatoid arthritis, Crohn s disease, or psoriasis; or have you had radiation treatments?   No   Have you had a seizure, or a brain or other nervous system problem?   No   During the past year, have you received a transfusion of blood or blood    products, or been given immune (gamma) globulin or antiviral drug?   No   For women: Are you pregnant or is there a chance you could become       pregnant during the next month?   No   Have you received any vaccinations in the past 4 weeks?   No     Immunization questionnaire was positive for at least  one answer.  Notified Dr. Carreon.      Patient instructed to remain in clinic for 15 minutes afterwards, and to report any adverse reactions.     Screening performed by Erica Medina CMA on 11/20/2023 at 3:26 PM.

## 2023-11-22 ENCOUNTER — TELEPHONE (OUTPATIENT)
Dept: FAMILY MEDICINE | Facility: CLINIC | Age: 68
End: 2023-11-22
Payer: MEDICARE

## 2023-11-22 NOTE — TELEPHONE ENCOUNTER
Reason for Call:  Home Health Care    Pastor with [facility] called regarding (reason for call): VO    Orders are needed for this patient. SNV     Skilled Nursin x weed for 1 week; 1 x week for 2 weeks    Pt Provider: Rebecca ALVARENGA MD    Phone Number Homecare Nurse can be reached at: [phone]    Can we leave a detailed message on this number? YES     Best Time: yes    Call taken on 2023 at 1:06 PM by Maxx Camarillo RN

## 2023-11-26 ASSESSMENT — ENCOUNTER SYMPTOMS
COUGH: 0
EYE PAIN: 0
SORE THROAT: 0
FATIGUE: 1
CONSTIPATION: 0
NERVOUS/ANXIOUS: 0
ACTIVITY CHANGE: 1
UNEXPECTED WEIGHT CHANGE: 1
MYALGIAS: 0
WEAKNESS: 0
FREQUENCY: 0
ARTHRALGIAS: 0
ALLERGIC/IMMUNOLOGIC NEGATIVE: 1
PALPITATIONS: 0
DIARRHEA: 0
BRUISES/BLEEDS EASILY: 0
FEVER: 0
HEADACHES: 0
SHORTNESS OF BREATH: 1
ABDOMINAL PAIN: 0
CHILLS: 0
DIZZINESS: 0
DYSURIA: 0
PARESTHESIAS: 0

## 2023-11-27 ENCOUNTER — TELEPHONE (OUTPATIENT)
Dept: FAMILY MEDICINE | Facility: CLINIC | Age: 68
End: 2023-11-27
Payer: MEDICARE

## 2023-11-27 NOTE — TELEPHONE ENCOUNTER
This is a printable form from [employer] or Dr. Carreon may have some in her rooms- she will just need a blank form placed at her desk with the patients name on it/label

## 2023-11-27 NOTE — TELEPHONE ENCOUNTER
General Call      Reason for Call:  Handicap sign permit    What are your questions or concerns:  Pt called and stated that he forgot to mention to PCP if PCP could get him a handicap sign permit for his car. Please call and advise pt, thanks!    Date of last appointment with provider: 11/20/2023    Okay to leave a detailed message?: Yes at Home number on file [phone] (home)

## 2023-12-01 ENCOUNTER — MEDICAL CORRESPONDENCE (OUTPATIENT)
Dept: HEALTH INFORMATION MANAGEMENT | Facility: CLINIC | Age: 68
End: 2023-12-01

## 2023-12-01 DIAGNOSIS — Z53.9 DIAGNOSIS NOT YET DEFINED: Primary | ICD-10-CM

## 2023-12-01 PROCEDURE — G0180 MD CERTIFICATION HHA PATIENT: HCPCS | Performed by: FAMILY MEDICINE

## 2023-12-05 ENCOUNTER — TRANSFERRED RECORDS (OUTPATIENT)
Dept: HEALTH INFORMATION MANAGEMENT | Facility: CLINIC | Age: 68
End: 2023-12-05
Payer: MEDICARE

## 2023-12-06 ENCOUNTER — TRANSFERRED RECORDS (OUTPATIENT)
Dept: HEALTH INFORMATION MANAGEMENT | Facility: CLINIC | Age: 68
End: 2023-12-06
Payer: MEDICARE

## 2023-12-11 ENCOUNTER — TELEPHONE (OUTPATIENT)
Dept: FAMILY MEDICINE | Facility: CLINIC | Age: 68
End: 2023-12-11
Payer: MEDICARE

## 2023-12-11 NOTE — TELEPHONE ENCOUNTER
Patient called to check status of handicap permit. Per 11/27/23 TE, blank form was placed in PCP's in-basket today. Relayed this to patient, and instructed him we will call with PCP response. Patient verbalized understanding.

## 2023-12-13 NOTE — CONFIDENTIAL NOTE
Per Dr. Carreon, the pt is currently living in [place] and [place] license. We can not [place] for [place].    LVM for the pt, see above message and relay to pt #1

## 2023-12-15 ENCOUNTER — TELEPHONE (OUTPATIENT)
Dept: FAMILY MEDICINE | Facility: CLINIC | Age: 68
End: 2023-12-15
Payer: MEDICARE

## 2023-12-15 DIAGNOSIS — I26.99 BILATERAL PULMONARY EMBOLISM (H): Primary | ICD-10-CM

## 2023-12-15 DIAGNOSIS — I10 ESSENTIAL HYPERTENSION: ICD-10-CM

## 2023-12-15 DIAGNOSIS — R60.0 BILATERAL LOWER EXTREMITY EDEMA: ICD-10-CM

## 2023-12-15 RX ORDER — BUMETANIDE 1 MG/1
1 TABLET ORAL 2 TIMES DAILY
Qty: 180 TABLET | Refills: 1 | Status: SHIPPED | OUTPATIENT
Start: 2023-12-15 | End: 2024-07-03

## 2023-12-15 RX ORDER — SPIRONOLACTONE 50 MG/1
50 TABLET, FILM COATED ORAL EVERY MORNING
Qty: 90 TABLET | Refills: 1 | Status: SHIPPED | OUTPATIENT
Start: 2023-12-15 | End: 2024-06-12

## 2023-12-15 NOTE — TELEPHONE ENCOUNTER
New Medication Request        What medication are you requesting?: eliquis, bumetanide, spironolactone    Reason for medication request: N/A    Have you taken this medication before?: Yes: pt states he was in the hospital ([facility]) for 3 weeks and was prescribed those 3 medications while there, he states he is out and was told that he needed to contact his PCP for any refills. Please look into this request and advise. Call pt back if you have any questions   Thanks!    Controlled Substance Agreement on file:   CSA -- Patient Level:    CSA: None found at the patient level.         Patient offered an appointment? No    Preferred Pharmacy:   [facility] [address]  [address] [facility] [address]  Phone: [phone] Fax: [phone]      Okay to leave a detailed message?: Yes at Home number on file [phone] (home)

## 2023-12-26 ENCOUNTER — MEDICAL CORRESPONDENCE (OUTPATIENT)
Dept: HEALTH INFORMATION MANAGEMENT | Facility: CLINIC | Age: 68
End: 2023-12-26
Payer: MEDICARE

## 2023-12-26 DIAGNOSIS — Z53.9 DIAGNOSIS NOT YET DEFINED: Primary | ICD-10-CM

## 2023-12-26 PROCEDURE — G0180 MD CERTIFICATION HHA PATIENT: HCPCS | Performed by: FAMILY MEDICINE

## 2024-01-29 ENCOUNTER — OFFICE VISIT (OUTPATIENT)
Dept: FAMILY MEDICINE | Facility: CLINIC | Age: 69
End: 2024-01-29
Payer: MEDICARE

## 2024-01-29 VITALS
DIASTOLIC BLOOD PRESSURE: 74 MMHG | BODY MASS INDEX: 44.88 KG/M2 | WEIGHT: 303 LBS | TEMPERATURE: 97.5 F | HEART RATE: 67 BPM | HEIGHT: 69 IN | SYSTOLIC BLOOD PRESSURE: 124 MMHG | OXYGEN SATURATION: 92 % | RESPIRATION RATE: 20 BRPM

## 2024-01-29 DIAGNOSIS — J96.02 ACUTE RESPIRATORY FAILURE WITH HYPERCAPNIA (H): ICD-10-CM

## 2024-01-29 DIAGNOSIS — I26.99 BILATERAL PULMONARY EMBOLISM (H): Primary | ICD-10-CM

## 2024-01-29 DIAGNOSIS — D69.6 THROMBOCYTOPENIA (H): ICD-10-CM

## 2024-01-29 DIAGNOSIS — E66.01 MORBID OBESITY (H): ICD-10-CM

## 2024-01-29 DIAGNOSIS — L60.0 INGROWING LEFT GREAT TOENAIL: ICD-10-CM

## 2024-01-29 DIAGNOSIS — I50.41 ACUTE COMBINED SYSTOLIC AND DIASTOLIC CONGESTIVE HEART FAILURE (H): ICD-10-CM

## 2024-01-29 DIAGNOSIS — G47.34 NOCTURNAL HYPOXIA: ICD-10-CM

## 2024-01-29 PROCEDURE — 91320 SARSCV2 VAC 30MCG TRS-SUC IM: CPT | Performed by: FAMILY MEDICINE

## 2024-01-29 PROCEDURE — 90480 ADMN SARSCOV2 VAC 1/ONLY CMP: CPT | Performed by: FAMILY MEDICINE

## 2024-01-29 PROCEDURE — 99214 OFFICE O/P EST MOD 30 MIN: CPT | Mod: 25 | Performed by: FAMILY MEDICINE

## 2024-01-29 PROCEDURE — G0008 ADMIN INFLUENZA VIRUS VAC: HCPCS | Performed by: FAMILY MEDICINE

## 2024-01-29 PROCEDURE — 90662 IIV NO PRSV INCREASED AG IM: CPT | Performed by: FAMILY MEDICINE

## 2024-01-29 NOTE — PROGRESS NOTES
1. Bilateral pulmonary embolism (H)  This is a 67 yo male with h/o bilateral PE - still on anticoagulant therapy - isn't clear how long he needs to continue his medication -     2. Acute respiratory failure with hypercapnia (H)  As above - resolved.     3. Acute combined systolic and diastolic congestive heart failure (H)  Acute, now chronic CHF - no new symptoms - doing well -     4. Thrombocytopenia (H24)  H/o thrombocytopenia - will continue to follow - likely more of a consequence of his recent severe illness     5. Morbid obesity (H)  Discussed diet/exercise     6. Ingrowing left great toenail  Patient has ingrowing  left great toenail with deformity - will refer to Podiatry as he may require some intervention.    - Orthopedic  Referral; Future    7. Nocturnal hypoxia  H/o nocturnal hypoxia (notable in hospital) - should have sleep study - referred for sleep evaluation   - Adult Sleep Eval & Management  Referral; Future      Subjective   Miguel is a 68 year old, presenting for the following health issues:  Referral (Wants to discuss getting sleep study done) and Ingrown Toenail (Left big toe)        1/29/2024    10:09 AM   Additional Questions   Roomed by Erica     Bumetanide 1 mg BID  Eliquis 5 mg BID  Spironolactone 25 mg 2 tab po qam  Atenolol 25 mg daily     History of Present Illness       Reason for visit:  Wants to discuss getting sleep study done and possible left big ingrown toe nail      Review of Systems   Constitutional:  Negative for chills and fever.   HENT:  Negative for ear pain and sore throat.    Eyes:  Negative for pain and visual disturbance.   Respiratory:  Positive for cough and shortness of breath.    Cardiovascular:  Negative for chest pain and palpitations.   Gastrointestinal:  Negative for abdominal pain and vomiting.   Genitourinary:  Negative for dysuria and hematuria.   Musculoskeletal:  Negative for arthralgias and back pain.   Skin:  Negative for color change  "and rash.        Deformed great toenail - left foot - with circular pattern at distal nail - erythema along margins of nails - no infection    Neurological:  Negative for seizures and syncope.   Hematological:  Does not bruise/bleed easily.   Psychiatric/Behavioral: Negative.     All other systems reviewed and are negative.            Objective    /74 (BP Location: Right arm, Patient Position: Sitting, Cuff Size: Adult Large)   Pulse 67   Temp 97.5  F (36.4  C) (Temporal)   Resp 20   Ht 1.75 m (5' 8.9\")   Wt 137.4 kg (303 lb)   SpO2 92%   BMI 44.88 kg/m    Body mass index is 44.88 kg/m .  Physical Exam  Vitals reviewed.   Constitutional:       General: He is not in acute distress.     Appearance: Normal appearance.   HENT:      Head: Normocephalic.      Right Ear: Tympanic membrane, ear canal and external ear normal.      Left Ear: Tympanic membrane, ear canal and external ear normal.      Nose: Nose normal.      Mouth/Throat:      Mouth: Mucous membranes are moist.      Pharynx: No posterior oropharyngeal erythema.   Eyes:      Extraocular Movements: Extraocular movements intact.      Conjunctiva/sclera: Conjunctivae normal.      Pupils: Pupils are equal, round, and reactive to light.   Cardiovascular:      Rate and Rhythm: Normal rate and regular rhythm.      Pulses: Normal pulses.      Heart sounds: Normal heart sounds. No murmur heard.  Pulmonary:      Effort: Pulmonary effort is normal.      Breath sounds: Normal breath sounds.   Abdominal:      Palpations: Abdomen is soft. There is no mass.      Tenderness: There is no abdominal tenderness. There is no guarding or rebound.   Musculoskeletal:         General: No deformity. Normal range of motion.      Cervical back: Normal range of motion and neck supple.      Comments: Toenail deformities; left great toenail deformity with distal curving - erythema at borders of nail     Lymphadenopathy:      Cervical: No cervical adenopathy.   Skin:     General: " Skin is warm and dry.   Neurological:      General: No focal deficit present.      Mental Status: He is alert and oriented to person, place, and time.   Psychiatric:         Mood and Affect: Mood normal.         Behavior: Behavior normal.            No results found for any visits on 01/29/24.        Signed Electronically by: MARLYN SAAVEDRA MD      Prior to immunization administration, verified patients identity using patient s name and date of birth. Please see Immunization Activity for additional information.     Screening Questionnaire for Adult Immunization    Are you sick today?   No   Do you have allergies to medications, food, a vaccine component or latex?   Yes   Have you ever had a serious reaction after receiving a vaccination?   No   Do you have a long-term health problem with heart, lung, kidney, or metabolic disease (e.g., diabetes), asthma, a blood disorder, no spleen, complement component deficiency, a cochlear implant, or a spinal fluid leak?  Are you on long-term aspirin therapy?   No   Do you have cancer, leukemia, HIV/AIDS, or any other immune system problem?   No   Do you have a parent, brother, or sister with an immune system problem?   Yes   In the past 3 months, have you taken medications that affect  your immune system, such as prednisone, other steroids, or anticancer drugs; drugs for the treatment of rheumatoid arthritis, Crohn s disease, or psoriasis; or have you had radiation treatments?   Yes   Have you had a seizure, or a brain or other nervous system problem?   No   During the past year, have you received a transfusion of blood or blood    products, or been given immune (gamma) globulin or antiviral drug?   No   For women: Are you pregnant or is there a chance you could become       pregnant during the next month?   No   Have you received any vaccinations in the past 4 weeks?   No     Immunization questionnaire was positive for at least one answer.  Notified   Velma.      Patient instructed to remain in clinic for 15 minutes afterwards, and to report any adverse reactions.     Screening performed by Erica Medina CMA on 1/29/2024 at 10:12 AM.

## 2024-02-04 RX ORDER — HYDROCHLOROTHIAZIDE 12.5 MG/1
1 CAPSULE ORAL EVERY MORNING
COMMUNITY
End: 2024-08-21

## 2024-02-04 ASSESSMENT — ENCOUNTER SYMPTOMS
HEMATURIA: 0
BACK PAIN: 0
ABDOMINAL PAIN: 0
EYE PAIN: 0
SORE THROAT: 0
VOMITING: 0
DYSURIA: 0
ARTHRALGIAS: 0
PSYCHIATRIC NEGATIVE: 1
SHORTNESS OF BREATH: 1
FEVER: 0
BRUISES/BLEEDS EASILY: 0
COUGH: 1
PALPITATIONS: 0
COLOR CHANGE: 0
CHILLS: 0
SEIZURES: 0

## 2024-02-05 ENCOUNTER — OFFICE VISIT (OUTPATIENT)
Dept: PODIATRY | Facility: CLINIC | Age: 69
End: 2024-02-05
Attending: FAMILY MEDICINE
Payer: MEDICARE

## 2024-02-05 VITALS
DIASTOLIC BLOOD PRESSURE: 76 MMHG | WEIGHT: 303 LBS | HEART RATE: 69 BPM | BODY MASS INDEX: 44.88 KG/M2 | OXYGEN SATURATION: 92 % | SYSTOLIC BLOOD PRESSURE: 114 MMHG | HEIGHT: 69 IN

## 2024-02-05 DIAGNOSIS — L60.0 INGROWN NAIL OF GREAT TOE OF LEFT FOOT: Primary | ICD-10-CM

## 2024-02-05 PROCEDURE — 99203 OFFICE O/P NEW LOW 30 MIN: CPT | Mod: 25 | Performed by: PODIATRIST

## 2024-02-05 PROCEDURE — 11750 EXCISION NAIL&NAIL MATRIX: CPT | Mod: TA | Performed by: PODIATRIST

## 2024-02-05 NOTE — LETTER
"    2/5/2024         RE: Miguel Baca  [address] [place] [address] [address]  [facility] [address]        Dear Colleague,    Thank you for referring your patient, Miguel Baca, to the [facility]. Please see a copy of my visit note below.    Miguel Baca is a 68 year old male who presents with a chief complaint of a painful ingrown toenail to the left great toe.  The patient relates pain when wearing shoes.  The patient denies any redness extending up the big toe into the foot.  The patient relates the condition has been getting worse over the past several weeks.         Pertinent medical, surgical and family history was reviewed in the chart.    Vitals: /76   Pulse 69   Ht 1.75 m (5' 8.9\")   Wt 137.4 kg (303 lb)   SpO2 92%   BMI 44.88 kg/m    BMI= Body mass index is 44.88 kg/m .    LOWER EXTREMITY PHYSICAL EXAM    Dermatologic: One notes an inflamed nail border of the left great toe.  There is noted erythema and edema located around the labial fold and does not extend past the interphalangeal joint.  There is no apparent purulent drainage noted.  There is pain on palpation to the proximal aspect of the nail border.  Otherwise, the skin is intact to both lower extremities without significant lesions, rash or abrasion.           Vascular: DP & PT pulses are intact & regular on the left.   CFT and skin temperature is normal to the left lower extremities.     Neurologic: Lower extremity sensation is intact to light touch.  No evidence of weakness in the left lower extremities.        Musculoskeletal: Patient is ambulatory without assistive device or brace.  No gross ankle deformity noted.  No foot or ankle joint effusion is noted.         ASSESSMENT / PLAN:     ICD-10-CM    1. Ingrown nail of great toe of left foot  L60.0 REMOVAL NAIL/NAIL BED, PARTIAL OR COMPLETE          Plan:  I have explained to Miguel about the condition.  The potential causes and nature of an ingrown " toenail were discussed with the patient.  We reviewed the natural history and prognosis of the condition and potential risks if no treatment is provided.  Treatment options discussed included conservative management (oral antibiotics, soaking of foot, adequate width shoes)  as well as surgical management (partial or total nail removal).  The pros and cons of both forms as well as risks and benefits of treatment were reviewed.       At this point, I recommended having the offending nail border permanently removed from the left great toe.  I have explained to the patient all of the possible risks, benefits, alternatives to the procedure.  The patient consented to the proposed procedure.  The left hallux was swabbed with alcohol.  Next, approximately 3 cc of 1% lidocaine plain was injected around the left hallux.  The left hallux was then prepped with Betadine ointment.  A tourniquet was applied to the left hallux.  A [place] elevator was utilized to free up the eponychium of the offending nail border.  Next, the offending nail border was split using an English anvil back to the matrix.  Next, utilizing a straight hemostat, the offending nail border was avulsed in toto.  The wound bed was debrided on any remaining nail and hyperkeratotic skin.  Next, the offending nail matrix was treated with 89% phenol using microtip cotton applicators for 30 seconds.  The wound was then irrigated and dressed with bacitracin antibiotic ointment and a compressive  sterile dressing.  The tourniquet was removed and a prompt hyperemic response was noted.  The patient tolerated the procedure well with no complications.  The patient was given post procedure instructions for the care of the wound.  The patient was informed that it is common to experience redness with watery drainage coming from the treated areas of the toe related to the phenol application.  The patient will return in two weeks for reevaluation and was instructed to notify the  office if any redness extending past the big toe joint or fever with chills are experienced before then.      There is low risk of morbidity with the procedure.  There was no overlap in work associated with the evaluation/management and the work associated with the procedure.    Miguel verbalized agreement with and understanding of the rational for the diagnosis and treatment plan.  All questions were answered to best of my ability and the patient's satisfaction. The patient was advised to contact the clinic with any questions that may arise after the clinic visit.      Disclaimer: This note consists of symbols derived from keyboarding, dictation and/or voice recognition software. As a result, there may be errors in the script that have gone undetected. Please consider this when interpreting information found in this chart.      BRIGITTE Martins.P.BERRY., F.A.C.F.A.S.      Again, thank you for allowing me to participate in the care of your patient.        Sincerely,        Hood Hudson DPM

## 2024-02-05 NOTE — NURSING NOTE
"Chief Complaint   Patient presents with    Ingrown Toenail     Great left toenail       Initial /76   Pulse 69   Ht 1.75 m (5' 8.9\")   Wt 137.4 kg (303 lb)   BMI 44.88 kg/m   Estimated body mass index is 44.88 kg/m  as calculated from the following:    Height as of this encounter: 1.75 m (5' 8.9\").    Weight as of this encounter: 137.4 kg (303 lb).  Medications and allergies reviewed.      Ebony BRIGGS MA    "

## 2024-02-05 NOTE — PROGRESS NOTES
"Miguel Baca is a 68 year old male who presents with a chief complaint of a painful ingrown toenail to the left great toe.  The patient relates pain when wearing shoes.  The patient denies any redness extending up the big toe into the foot.  The patient relates the condition has been getting worse over the past several weeks.         Pertinent medical, surgical and family history was reviewed in the chart.    Vitals: /76   Pulse 69   Ht 1.75 m (5' 8.9\")   Wt 137.4 kg (303 lb)   SpO2 92%   BMI 44.88 kg/m    BMI= Body mass index is 44.88 kg/m .    LOWER EXTREMITY PHYSICAL EXAM    Dermatologic: One notes an inflamed nail border of the left great toe.  There is noted erythema and edema located around the labial fold and does not extend past the interphalangeal joint.  There is no apparent purulent drainage noted.  There is pain on palpation to the proximal aspect of the nail border.  Otherwise, the skin is intact to both lower extremities without significant lesions, rash or abrasion.           Vascular: DP & PT pulses are intact & regular on the left.   CFT and skin temperature is normal to the left lower extremities.     Neurologic: Lower extremity sensation is intact to light touch.  No evidence of weakness in the left lower extremities.        Musculoskeletal: Patient is ambulatory without assistive device or brace.  No gross ankle deformity noted.  No foot or ankle joint effusion is noted.         ASSESSMENT / PLAN:     ICD-10-CM    1. Ingrown nail of great toe of left foot  L60.0 REMOVAL NAIL/NAIL BED, PARTIAL OR COMPLETE          Plan:  I have explained to Miguel about the condition.  The potential causes and nature of an ingrown toenail were discussed with the patient.  We reviewed the natural history and prognosis of the condition and potential risks if no treatment is provided.  Treatment options discussed included conservative management (oral antibiotics, soaking of foot, adequate width shoes)  " as well as surgical management (partial or total nail removal).  The pros and cons of both forms as well as risks and benefits of treatment were reviewed.       At this point, I recommended having the offending nail border permanently removed from the left great toe.  I have explained to the patient all of the possible risks, benefits, alternatives to the procedure.  The patient consented to the proposed procedure.  The left hallux was swabbed with alcohol.  Next, approximately 3 cc of 1% lidocaine plain was injected around the left hallux.  The left hallux was then prepped with Betadine ointment.  A tourniquet was applied to the left hallux.  A [place] elevator was utilized to free up the eponychium of the offending nail border.  Next, the offending nail border was split using an English anvil back to the matrix.  Next, utilizing a straight hemostat, the offending nail border was avulsed in toto.  The wound bed was debrided on any remaining nail and hyperkeratotic skin.  Next, the offending nail matrix was treated with 89% phenol using microtip cotton applicators for 30 seconds.  The wound was then irrigated and dressed with bacitracin antibiotic ointment and a compressive  sterile dressing.  The tourniquet was removed and a prompt hyperemic response was noted.  The patient tolerated the procedure well with no complications.  The patient was given post procedure instructions for the care of the wound.  The patient was informed that it is common to experience redness with watery drainage coming from the treated areas of the toe related to the phenol application.  The patient will return in two weeks for reevaluation and was instructed to notify the office if any redness extending past the big toe joint or fever with chills are experienced before then.      There is low risk of morbidity with the procedure.  There was no overlap in work associated with the evaluation/management and the work associated with the  procedure.    Miguel verbalized agreement with and understanding of the rational for the diagnosis and treatment plan.  All questions were answered to best of my ability and the patient's satisfaction. The patient was advised to contact the clinic with any questions that may arise after the clinic visit.      Disclaimer: This note consists of symbols derived from keyboarding, dictation and/or voice recognition software. As a result, there may be errors in the script that have gone undetected. Please consider this when interpreting information found in this chart.      REGINA Hudson D.P.M., F.SAMRA.C.F.A.S.

## 2024-02-08 ENCOUNTER — TRANSFERRED RECORDS (OUTPATIENT)
Dept: HEALTH INFORMATION MANAGEMENT | Facility: CLINIC | Age: 69
End: 2024-02-08
Payer: MEDICARE

## 2024-03-14 ENCOUNTER — TELEPHONE (OUTPATIENT)
Dept: FAMILY MEDICINE | Facility: CLINIC | Age: 69
End: 2024-03-14
Payer: MEDICARE

## 2024-03-14 NOTE — TELEPHONE ENCOUNTER
"Dental hygienist calling  Pt is currently in the chair at their office for a dental cleaning and exam.  They typically wait until the \"six month hazel with no heart issues before they will see a patient again.\"    Pt had a bilateral pulmonary embolism and acute CHF on 10/31/23.    Can they proceed today?    Routing to [facility] high priority.    Triage: call back to [phone] and ask for Nichelle.    Gosia MCKENNA RN  [facility]        "

## 2024-03-14 NOTE — TELEPHONE ENCOUNTER
Writer called [facility]:  No answer and voicemail option was for appt only so unclear how often it is checked    Writer recalled Obed Covarrubias: spoke with Nichelle and relayed message per ALINA Rodriguez PA-C.    Nichelle verbalized understanding and stated they already rescheduled patient as they anticipated the answer to be as per ALINA Rodriguez PA-C, SHEEBA RamirezN, RN-BC  [facility]

## 2024-03-14 NOTE — TELEPHONE ENCOUNTER
His PCP is out of the office today.  I have no knowledge of this patient.  Would need to wait for response from PCP next week, or schedule a visit with PCP to discuss.

## 2024-04-18 ENCOUNTER — TELEPHONE (OUTPATIENT)
Dept: SLEEP MEDICINE | Facility: CLINIC | Age: 69
End: 2024-04-18
Payer: MEDICARE

## 2024-04-18 NOTE — TELEPHONE ENCOUNTER
Voicemail was left regarding sleep appointment location change.  Call back number for central scheduling left.

## 2024-05-09 ENCOUNTER — TRANSFERRED RECORDS (OUTPATIENT)
Dept: HEALTH INFORMATION MANAGEMENT | Facility: CLINIC | Age: 69
End: 2024-05-09
Payer: MEDICARE

## 2024-06-10 ENCOUNTER — PATIENT OUTREACH (OUTPATIENT)
Dept: CARE COORDINATION | Facility: CLINIC | Age: 69
End: 2024-06-10
Payer: MEDICARE

## 2024-06-12 DIAGNOSIS — R60.0 BILATERAL LOWER EXTREMITY EDEMA: ICD-10-CM

## 2024-06-12 RX ORDER — SPIRONOLACTONE 25 MG/1
50 TABLET ORAL EVERY MORNING
Qty: 180 TABLET | Refills: 1 | Status: SHIPPED | OUTPATIENT
Start: 2024-06-12

## 2024-06-24 ENCOUNTER — PATIENT OUTREACH (OUTPATIENT)
Dept: CARE COORDINATION | Facility: CLINIC | Age: 69
End: 2024-06-24
Payer: MEDICARE

## 2024-07-03 DIAGNOSIS — R60.0 BILATERAL LOWER EXTREMITY EDEMA: ICD-10-CM

## 2024-07-03 DIAGNOSIS — I26.99 BILATERAL PULMONARY EMBOLISM (H): ICD-10-CM

## 2024-07-03 RX ORDER — BUMETANIDE 1 MG/1
1 TABLET ORAL 2 TIMES DAILY
Qty: 180 TABLET | Refills: 1 | Status: SHIPPED | OUTPATIENT
Start: 2024-07-03

## 2024-07-03 RX ORDER — APIXABAN 5 MG/1
5 TABLET, FILM COATED ORAL 2 TIMES DAILY
Qty: 60 TABLET | Refills: 1 | Status: SHIPPED | OUTPATIENT
Start: 2024-07-03 | End: 2024-09-09

## 2024-07-25 ENCOUNTER — TELEPHONE (OUTPATIENT)
Dept: FAMILY MEDICINE | Facility: CLINIC | Age: 69
End: 2024-07-25
Payer: MEDICARE

## 2024-07-25 NOTE — TELEPHONE ENCOUNTER
Forms/Letter Request    Type of form/letter: OTHER: [facility] ORDER       Do we have the form/letter: Yes:     Who is the form from? : [facility]-    Where did/will the form come from? form was faxed in    When is form/letter needed by: NA    How would you like the form/letter returned: Fax : [phone]

## 2024-07-26 ENCOUNTER — MEDICAL CORRESPONDENCE (OUTPATIENT)
Dept: HEALTH INFORMATION MANAGEMENT | Facility: CLINIC | Age: 69
End: 2024-07-26
Payer: MEDICARE

## 2024-08-14 ENCOUNTER — TRANSFERRED RECORDS (OUTPATIENT)
Dept: HEALTH INFORMATION MANAGEMENT | Facility: CLINIC | Age: 69
End: 2024-08-14
Payer: MEDICARE

## 2024-08-21 ENCOUNTER — OFFICE VISIT (OUTPATIENT)
Dept: FAMILY MEDICINE | Facility: CLINIC | Age: 69
End: 2024-08-21
Payer: MEDICARE

## 2024-08-21 VITALS
SYSTOLIC BLOOD PRESSURE: 114 MMHG | HEART RATE: 83 BPM | HEIGHT: 69 IN | DIASTOLIC BLOOD PRESSURE: 74 MMHG | OXYGEN SATURATION: 93 % | TEMPERATURE: 97 F | WEIGHT: 315 LBS | RESPIRATION RATE: 20 BRPM | BODY MASS INDEX: 46.65 KG/M2

## 2024-08-21 DIAGNOSIS — Z01.818 PREOP EXAMINATION: Primary | ICD-10-CM

## 2024-08-21 DIAGNOSIS — I10 ESSENTIAL HYPERTENSION: ICD-10-CM

## 2024-08-21 DIAGNOSIS — Z13.220 LIPID SCREENING: ICD-10-CM

## 2024-08-21 DIAGNOSIS — I50.41 ACUTE COMBINED SYSTOLIC AND DIASTOLIC CONGESTIVE HEART FAILURE (H): ICD-10-CM

## 2024-08-21 DIAGNOSIS — N99.114 POSTPROCEDURAL MALE URETHRAL STRICTURE: ICD-10-CM

## 2024-08-21 DIAGNOSIS — Z23 NEED FOR SHINGLES VACCINE: ICD-10-CM

## 2024-08-21 DIAGNOSIS — Z29.11 NEED FOR VACCINATION AGAINST RESPIRATORY SYNCYTIAL VIRUS: ICD-10-CM

## 2024-08-21 DIAGNOSIS — R73.9 ELEVATED RANDOM BLOOD GLUCOSE LEVEL: ICD-10-CM

## 2024-08-21 LAB
ALT SERPL W P-5'-P-CCNC: 19 U/L (ref 0–70)
ANION GAP SERPL CALCULATED.3IONS-SCNC: 13 MMOL/L (ref 7–15)
ATRIAL RATE - MUSE: 82 BPM
BUN SERPL-MCNC: 23.6 MG/DL (ref 8–23)
CALCIUM SERPL-MCNC: 9.5 MG/DL (ref 8.8–10.4)
CHLORIDE SERPL-SCNC: 95 MMOL/L (ref 98–107)
CHOLEST SERPL-MCNC: 141 MG/DL
CREAT SERPL-MCNC: 1.21 MG/DL (ref 0.67–1.17)
DIASTOLIC BLOOD PRESSURE - MUSE: NORMAL MMHG
EGFRCR SERPLBLD CKD-EPI 2021: 64 ML/MIN/1.73M2
ERYTHROCYTE [DISTWIDTH] IN BLOOD BY AUTOMATED COUNT: 15 % (ref 10–15)
FASTING STATUS PATIENT QL REPORTED: NO
FASTING STATUS PATIENT QL REPORTED: NO
GLUCOSE SERPL-MCNC: 157 MG/DL (ref 70–99)
HCO3 SERPL-SCNC: 27 MMOL/L (ref 22–29)
HCT VFR BLD AUTO: 45.6 % (ref 40–53)
HDLC SERPL-MCNC: 25 MG/DL
HGB BLD-MCNC: 15 G/DL (ref 13.3–17.7)
INTERPRETATION ECG - MUSE: NORMAL
LDLC SERPL CALC-MCNC: ABNORMAL MG/DL
MCH RBC QN AUTO: 30.6 PG (ref 26.5–33)
MCHC RBC AUTO-ENTMCNC: 32.9 G/DL (ref 31.5–36.5)
MCV RBC AUTO: 93 FL (ref 78–100)
NONHDLC SERPL-MCNC: 116 MG/DL
P AXIS - MUSE: 45 DEGREES
PLATELET # BLD AUTO: 155 10E3/UL (ref 150–450)
POTASSIUM SERPL-SCNC: 4.7 MMOL/L (ref 3.4–5.3)
PR INTERVAL - MUSE: 166 MS
QRS DURATION - MUSE: 86 MS
QT - MUSE: 378 MS
QTC - MUSE: 441 MS
R AXIS - MUSE: 42 DEGREES
RBC # BLD AUTO: 4.9 10E6/UL (ref 4.4–5.9)
SODIUM SERPL-SCNC: 135 MMOL/L (ref 135–145)
SYSTOLIC BLOOD PRESSURE - MUSE: NORMAL MMHG
T AXIS - MUSE: 60 DEGREES
TRIGL SERPL-MCNC: 421 MG/DL
TSH SERPL DL<=0.005 MIU/L-ACNC: 3.12 UIU/ML (ref 0.3–4.2)
VENTRICULAR RATE- MUSE: 82 BPM
WBC # BLD AUTO: 9.2 10E3/UL (ref 4–11)

## 2024-08-21 PROCEDURE — 99214 OFFICE O/P EST MOD 30 MIN: CPT | Performed by: FAMILY MEDICINE

## 2024-08-21 PROCEDURE — 93005 ELECTROCARDIOGRAM TRACING: CPT | Performed by: FAMILY MEDICINE

## 2024-08-21 PROCEDURE — 84460 ALANINE AMINO (ALT) (SGPT): CPT | Performed by: FAMILY MEDICINE

## 2024-08-21 PROCEDURE — 84443 ASSAY THYROID STIM HORMONE: CPT | Performed by: FAMILY MEDICINE

## 2024-08-21 PROCEDURE — 93010 ELECTROCARDIOGRAM REPORT: CPT | Mod: OFF | Performed by: INTERNAL MEDICINE

## 2024-08-21 PROCEDURE — 85027 COMPLETE CBC AUTOMATED: CPT | Performed by: FAMILY MEDICINE

## 2024-08-21 PROCEDURE — 80048 BASIC METABOLIC PNL TOTAL CA: CPT | Performed by: FAMILY MEDICINE

## 2024-08-21 PROCEDURE — 83036 HEMOGLOBIN GLYCOSYLATED A1C: CPT | Performed by: FAMILY MEDICINE

## 2024-08-21 PROCEDURE — 36415 COLL VENOUS BLD VENIPUNCTURE: CPT | Performed by: FAMILY MEDICINE

## 2024-08-21 PROCEDURE — 80061 LIPID PANEL: CPT | Performed by: FAMILY MEDICINE

## 2024-08-21 ASSESSMENT — ENCOUNTER SYMPTOMS
BRUISES/BLEEDS EASILY: 1
VOMITING: 0
DYSURIA: 0
HEMATURIA: 0
PSYCHIATRIC NEGATIVE: 1
BACK PAIN: 0
SEIZURES: 0
EYE PAIN: 0
COUGH: 0
PALPITATIONS: 0
CHILLS: 0
ABDOMINAL PAIN: 0
COLOR CHANGE: 0
SHORTNESS OF BREATH: 0
ARTHRALGIAS: 0
FEVER: 0
DIFFICULTY URINATING: 1
SORE THROAT: 0

## 2024-08-21 NOTE — PROGRESS NOTES
Preoperative Evaluation  [facility] [address] [place] [address]  Phone: [phone]  Fax: [phone]  Primary Provider: MARLYN SAAVEDRA MD  Pre-op Performing Provider: MARLYN SAAVEDRA MD  Aug 21, 2024             8/21/2024   Surgical Information   What procedure is being done? urethral stricture   Facility or Hospital where procedure/surgery will be performed: [facility]   Who is doing the procedure / surgery? Felix Lloyd   Date of surgery / procedure: 9/10/2024   Time of surgery / procedure: 10:20 am   Where do you plan to recover after surgery? at home with family        Fax number for surgical facility: Note does not need to be faxed, will be available electronically in Epic.    Assessment & Plan     The proposed surgical procedure is considered LOW risk.    Problem List Items Addressed This Visit       Essential Hypertension    Acute combined systolic and diastolic congestive heart failure (H)     Other Visit Diagnoses       Preop examination    -  Primary    Postprocedural male urethral stricture        Need for shingles vaccine        Need for vaccination against respiratory syncytial virus                This is a 70 yo male here for preop assessment prior to planned procedure for urethral stricture.  He has a history of significant illness a year ago that led to hypoxia; received Ceftriaxone and had anaphylaxis/respiratory failure.  Had complicated hospitalization.  Required catheterization - now has difficulty with urinary retention related to urethral stricture.      Other medical conditions:    Essential Hypertension - blood pressure is controlled.   CHF - generally stable  Pre-diabetes - A1c 6.3 today         - No identified additional risk factors other than previously addressed    Antiplatelet or Anticoagulation Medication Instructions   - apixaban (Eliquis), edoxaban (Savaysa), rivaroxaban  (Xarelto): Bleeding risk is moderate or high for this procedure AND CrCl  (>=) 50 mL/min. DO NOT TAKE 2 days before surgery.     Additional Medication Instructions   - Beta Blockers: Continue taking on the day of surgery.   - Diuretics: DO NOT TAKE on the day of surgery.    Recommendation  Approval given to proceed with proposed procedure, without further diagnostic evaluation.    Yazmin Ly is a 69 year old, presenting for the following:  Pre-Op Exam and Back Pain (Pt stated pain in lower back not sure if bladder acting up )          8/21/2024     9:01 AM   Additional Questions   Roomed by Cindy SANCHEZ related to upcoming procedure: patient had previous bladder catheterization - left some scarring - has post void residual; exam shows urethral stricture; scheduled for procedure to relieve stricture (Optilume)         8/21/2024   Pre-Op Questionnaire   Have you ever had a heart attack or stroke? (!) YES - anaphylaxis to Ceftriaxone - with intubation, CHF; bilateral pulmonary emboli   Have you ever had surgery on your heart or blood vessels, such as a stent placement, a coronary artery bypass, or surgery on an artery in your head, neck, heart, or legs? No   Do you have chest pain with activity? No   Do you have a history of heart failure? No   Do you currently have a cold, bronchitis or symptoms of other infection? No   Do you have a cough, shortness of breath, or wheezing? No   Do you or anyone in your family have previous history of blood clots? Multiple bilateral pulmonary emboli   Do you or does anyone in your family have a serious bleeding problem such as prolonged bleeding following surgeries or cuts? No   Have you ever had problems with anemia or been told to take iron pills? No   Have you had any abnormal blood loss such as black, tarry or bloody stools? No   Have you ever had a blood transfusion? (!) UNKNOWN   Are you willing to have a blood transfusion if it is medically needed before, during, or  after your surgery? Yes   Have you or any of your relatives ever had problems with anesthesia? No   Do you have sleep apnea, excessive snoring or daytime drowsiness? No   Do you have any artifical heart valves or other implanted medical devices like a pacemaker, defibrillator, or continuous glucose monitor? No   Do you have artificial joints? No   Are you allergic to latex? No        Health Care Directive  Patient does not have a Health Care Directive or Living Will: no written document      Preoperative Review of    reviewed - no record of controlled substances prescribed.      Status of Chronic Conditions:  See problem list for active medical problems.  Problems all longstanding and stable, except as noted/documented.  See ROS for pertinent symptoms related to these conditions.    Patient Active Problem List    Diagnosis Date Noted    Bilateral pulmonary embolism (H) 01/29/2024     Priority: Medium    Acute respiratory failure with hypercapnia (H) 01/29/2024     Priority: Medium    Acute combined systolic and diastolic congestive heart failure (H) 01/29/2024     Priority: Medium    Thrombocytopenia (H24) 01/29/2024     Priority: Medium    Primary osteoarthritis of both knees 02/19/2019     Priority: Medium    Morbid obesity (H)      Priority: Medium     Created by Conversion        Essential Hypertension      Priority: Medium     Created by Conversion  Replacement Utility updated for latest IMO load        Seborrheic Keratosis      Priority: Medium     Created by Conversion        Knee Sprain      Priority: Medium     Created by Conversion          Past Medical History:   Diagnosis Date    Acute chest pain 12/22/2015    Cellulitis and abscess of unspecified site     Created by Conversion     Colon polyps     Cough     Created by Conversion     Hypertension      Past Surgical History:   Procedure Laterality Date    ARTHROSCOPY KNEE Left 2012     Current Outpatient Medications   Medication Sig Dispense Refill  "   atenolol (TENORMIN) 25 MG tablet Take 1 tablet (25 mg) by mouth daily 90 tablet 3    bumetanide (BUMEX) 1 MG tablet Take 1 tablet (1 mg) by mouth 2 times daily 180 tablet 1    ELIQUIS ANTICOAGULANT 5 MG tablet TAKE 1 TABLET BY MOUTH TWICE DAILY 60 tablet 1    hydrochlorothiazide (MICROZIDE) 12.5 MG capsule Take 1 capsule by mouth every morning      spironolactone (ALDACTONE) 25 MG tablet TAKE 2 TABLETS BY MOUTH EVERY MORNING 180 tablet 1       Allergies   Allergen Reactions    Ceftriaxone Anaphylaxis        Social History     Tobacco Use    Smoking status: Former     Types: Cigarettes    Smokeless tobacco: Never   Substance Use Topics    Alcohol use: Not on file     Family History   Problem Relation Age of Onset    Coronary Artery Disease Father         s/p stents, CABG    Coronary Artery Disease Brother         s/p stents    Cancer Mother     Parkinsonism Paternal Grandmother     Diabetes Type 2  Paternal Grandmother      History   Drug Use Not on file           Review of Systems   Constitutional:  Negative for chills and fever.   HENT:  Negative for ear pain and sore throat.    Eyes:  Negative for pain and visual disturbance.   Respiratory:  Negative for cough and shortness of breath.    Cardiovascular:  Negative for chest pain and palpitations.   Gastrointestinal:  Negative for abdominal pain and vomiting.   Genitourinary:  Positive for difficulty urinating (known urethral stricture). Negative for dysuria and hematuria.   Musculoskeletal:  Negative for arthralgias and back pain.   Skin:  Negative for color change and rash.   Neurological:  Negative for seizures and syncope.   Hematological:  Bruises/bleeds easily (on Eliquis).   Psychiatric/Behavioral: Negative.     All other systems reviewed and are negative.        Objective    /74 (BP Location: Left arm, Patient Position: Sitting, Cuff Size: Adult Large)   Pulse 83   Temp 97  F (36.1  C) (Temporal)   Resp 20   Ht 1.75 m (5' 8.9\")   Wt (!) 150.1 " "kg (331 lb)   SpO2 93%   BMI 49.02 kg/m     Estimated body mass index is 49.02 kg/m  as calculated from the following:    Height as of this encounter: 1.75 m (5' 8.9\").    Weight as of this encounter: 150.1 kg (331 lb).  Physical Exam  Vitals reviewed.   Constitutional:       General: He is not in acute distress.     Appearance: Normal appearance. He is obese.   HENT:      Head: Normocephalic.      Right Ear: Tympanic membrane, ear canal and external ear normal.      Left Ear: Tympanic membrane, ear canal and external ear normal.      Nose: Nose normal.      Mouth/Throat:      Mouth: Mucous membranes are moist.      Pharynx: No posterior oropharyngeal erythema.   Eyes:      Extraocular Movements: Extraocular movements intact.      Conjunctiva/sclera: Conjunctivae normal.      Pupils: Pupils are equal, round, and reactive to light.   Cardiovascular:      Rate and Rhythm: Normal rate and regular rhythm.      Pulses: Normal pulses.      Heart sounds: Normal heart sounds. No murmur heard.  Pulmonary:      Effort: Pulmonary effort is normal.      Breath sounds: Normal breath sounds.   Abdominal:      Palpations: Abdomen is soft. There is no mass.      Tenderness: There is no abdominal tenderness. There is no guarding or rebound.   Musculoskeletal:         General: No deformity. Normal range of motion.      Cervical back: Normal range of motion and neck supple.   Lymphadenopathy:      Cervical: No cervical adenopathy.   Skin:     General: Skin is warm and dry.   Neurological:      General: No focal deficit present.      Mental Status: He is alert and oriented to person, place, and time.   Psychiatric:         Mood and Affect: Mood normal.         Behavior: Behavior normal.           Recent Labs   Lab Test 11/20/23  1628      POTASSIUM 4.7   CR 0.99        Diagnostics  Recent Results (from the past 168 hour(s))   EKG 12-lead, tracing only    Collection Time: 08/21/24  9:51 AM   Result Value Ref Range    Systolic " Blood Pressure  mmHg    Diastolic Blood Pressure  mmHg    Ventricular Rate 82 BPM    Atrial Rate 82 BPM    AZ Interval 166 ms    QRS Duration 86 ms     ms    QTc 441 ms    P Axis 45 degrees    R AXIS 42 degrees    T Axis 60 degrees    Interpretation ECG       Sinus rhythm  Normal ECG  No previous ECGs available  Confirmed by ODILIA GALICIA MD LOC:JN ([id number]) on 8/21/2024 3:16:07 PM     TSH WITH FREE T4 REFLEX    Collection Time: 08/21/24 10:08 AM   Result Value Ref Range    TSH 3.12 0.30 - 4.20 uIU/mL   ALT    Collection Time: 08/21/24 10:08 AM   Result Value Ref Range    ALT 19 0 - 70 U/L   CBC with Platelets    Collection Time: 08/21/24 10:08 AM   Result Value Ref Range    WBC Count 9.2 4.0 - 11.0 10e3/uL    RBC Count 4.90 4.40 - 5.90 10e6/uL    Hemoglobin 15.0 13.3 - 17.7 g/dL    Hematocrit 45.6 40.0 - 53.0 %    MCV 93 78 - 100 fL    MCH 30.6 26.5 - 33.0 pg    MCHC 32.9 31.5 - 36.5 g/dL    RDW 15.0 10.0 - 15.0 %    Platelet Count 155 150 - 450 10e3/uL   Basic metabolic panel  (Ca, Cl, CO2, Creat, Gluc, K, Na, BUN)    Collection Time: 08/21/24 10:08 AM   Result Value Ref Range    Sodium 135 135 - 145 mmol/L    Potassium 4.7 3.4 - 5.3 mmol/L    Chloride 95 (L) 98 - 107 mmol/L    Carbon Dioxide (CO2) 27 22 - 29 mmol/L    Anion Gap 13 7 - 15 mmol/L    Urea Nitrogen 23.6 (H) 8.0 - 23.0 mg/dL    Creatinine 1.21 (H) 0.67 - 1.17 mg/dL    GFR Estimate 64 >60 mL/min/1.73m2    Calcium 9.5 8.8 - 10.4 mg/dL    Glucose 157 (H) 70 - 99 mg/dL    Patient Fasting > 8hrs? No    Lipid Profile (Chol, Trig, HDL, LDL calc)    Collection Time: 08/21/24 10:08 AM   Result Value Ref Range    Cholesterol 141 <200 mg/dL    Triglycerides 421 (H) <150 mg/dL    Direct Measure HDL 25 (L) >=40 mg/dL    LDL Cholesterol Calculated      Non HDL Cholesterol 116 <130 mg/dL    Patient Fasting > 8hrs? No    Hemoglobin A1c    Collection Time: 08/21/24 10:08 AM   Result Value Ref Range    Hemoglobin A1C 6.3 (H) 0.0 - 5.6 %          Revised Cardiac  Risk Index (RCRI)  The patient has the following serious cardiovascular risks for perioperative complications:   - No serious cardiac risks = 0 points     RCRI Interpretation: 1 point: Class II (low risk - 0.9% complication rate)         Signed Electronically by: MARLYN SAAVEDRA MD  A copy of this evaluation report is provided to the requesting physician.         Prior to immunization administration, verified patients identity using patient s name and date of birth. Please see Immunization Activity for additional information.     Screening Questionnaire for Adult Immunization    Are you sick today?   No   Do you have allergies to medications, food, a vaccine component or latex?   No   Have you ever had a serious reaction after receiving a vaccination?   No   Do you have a long-term health problem with heart, lung, kidney, or metabolic disease (e.g., diabetes), asthma, a blood disorder, no spleen, complement component deficiency, a cochlear implant, or a spinal fluid leak?  Are you on long-term aspirin therapy?   No   Do you have cancer, leukemia, HIV/AIDS, or any other immune system problem?   No   Do you have a parent, brother, or sister with an immune system problem?   No   In the past 3 months, have you taken medications that affect  your immune system, such as prednisone, other steroids, or anticancer drugs; drugs for the treatment of rheumatoid arthritis, Crohn s disease, or psoriasis; or have you had radiation treatments?   No   Have you had a seizure, or a brain or other nervous system problem?   No   During the past year, have you received a transfusion of blood or blood    products, or been given immune (gamma) globulin or antiviral drug?   No   For women: Are you pregnant or is there a chance you could become       pregnant during the next month?   No   Have you received any vaccinations in the past 4 weeks?   Yes     Immunization questionnaire was positive for at least one answer.  Notified  Dr Carreon.      Patient instructed to remain in clinic for 15 minutes afterwards, and to report any adverse reactions.     Screening performed by Cindy Aguirre on 8/21/2024 at 9:07 AM.

## 2024-08-21 NOTE — LETTER
"August 28, 2024      Malachi Baca  [address]   [place] [address]        Dear ,    We are writing to inform you of your test results.    Labs show    A1c is increasing, slightly - this is \"pre-diabetes\" :  be mindful of your food choices, and increase activity as you are able  Your kidney function (creatinine, GFR) have slightly worsened (although no immediate concern).  Stay well hydrated.    EKG is normal.  Other labs normal.      Resulted Orders   EKG 12-lead, tracing only   Result Value Ref Range    Systolic Blood Pressure  mmHg    Diastolic Blood Pressure  mmHg    Ventricular Rate 82 BPM    Atrial Rate 82 BPM    UT Interval 166 ms    QRS Duration 86 ms     ms    QTc 441 ms    P Axis 45 degrees    R AXIS 42 degrees    T Axis 60 degrees    Interpretation ECG       Sinus rhythm  Normal ECG  No previous ECGs available  Confirmed by ODILIA GALICIA MD LOC: ([id number]) on 8/21/2024 3:16:07 PM     TSH WITH FREE T4 REFLEX   Result Value Ref Range    TSH 3.12 0.30 - 4.20 uIU/mL   ALT   Result Value Ref Range    ALT 19 0 - 70 U/L   CBC with Platelets   Result Value Ref Range    WBC Count 9.2 4.0 - 11.0 10e3/uL    RBC Count 4.90 4.40 - 5.90 10e6/uL    Hemoglobin 15.0 13.3 - 17.7 g/dL    Hematocrit 45.6 40.0 - 53.0 %    MCV 93 78 - 100 fL    MCH 30.6 26.5 - 33.0 pg    MCHC 32.9 31.5 - 36.5 g/dL    RDW 15.0 10.0 - 15.0 %    Platelet Count 155 150 - 450 10e3/uL   Basic metabolic panel  (Ca, Cl, CO2, Creat, Gluc, K, Na, BUN)   Result Value Ref Range    Sodium 135 135 - 145 mmol/L    Potassium 4.7 3.4 - 5.3 mmol/L    Chloride 95 (L) 98 - 107 mmol/L    Carbon Dioxide (CO2) 27 22 - 29 mmol/L    Anion Gap 13 7 - 15 mmol/L    Urea Nitrogen 23.6 (H) 8.0 - 23.0 mg/dL    Creatinine 1.21 (H) 0.67 - 1.17 mg/dL    GFR Estimate 64 >60 mL/min/1.73m2      Comment:      eGFR calculated using 2021 CKD-EPI equation.    Calcium 9.5 8.8 - 10.4 mg/dL      Comment:      Reference intervals for this test were updated " on 7/16/2024 to reflect our healthy population more accurately. There may be differences in the flagging of prior results with similar values performed with this method. Those prior results can be interpreted in the context of the updated reference intervals.    Glucose 157 (H) 70 - 99 mg/dL    Patient Fasting > 8hrs? No    Lipid Profile (Chol, Trig, HDL, LDL calc)   Result Value Ref Range    Cholesterol 141 <200 mg/dL    Triglycerides 421 (H) <150 mg/dL    Direct Measure HDL 25 (L) >=40 mg/dL    LDL Cholesterol Calculated        Comment:      Cannot estimate LDL when triglyceride exceeds 400 mg/dL    Non HDL Cholesterol 116 <130 mg/dL    Patient Fasting > 8hrs? No     Narrative    Cholesterol  Desirable:  <200 mg/dL    Triglycerides  Normal:  Less than 150 mg/dL  Borderline High:  150-199 mg/dL  High:  200-499 mg/dL  Very High:  Greater than or equal to 500 mg/dL    Direct Measure HDL  Female:  Greater than or equal to 50 mg/dL   Male:  Greater than or equal to 40 mg/dL    LDL Cholesterol  Desirable:  <100mg/dL  Above Desirable:  100-129 mg/dL   Borderline High:  130-159 mg/dL   High:  160-189 mg/dL   Very High:  >= 190 mg/dL    Non HDL Cholesterol  Desirable:  130 mg/dL  Above Desirable:  130-159 mg/dL  Borderline High:  160-189 mg/dL  High:  190-219 mg/dL  Very High:  Greater than or equal to 220 mg/dL   Hemoglobin A1c   Result Value Ref Range    Hemoglobin A1C 6.3 (H) 0.0 - 5.6 %      Comment:      Normal <5.7%   Prediabetes 5.7-6.4%    Diabetes 6.5% or higher     Note: Adopted from ADA consensus guidelines.       If you have any questions or concerns, please call the clinic at the number listed above.       Sincerely,      Yahaira Fernandez MD

## 2024-08-21 NOTE — H&P (VIEW-ONLY)
Preoperative Evaluation  [facility] [address] [address] [place] [address]  Phone: [phone]  Fax: [phone]  Primary Provider: MARLYN SAAVEDRA MD  Pre-op Performing Provider: MARLYN SAAVEDRA MD  Aug 21, 2024             8/21/2024   Surgical Information   What procedure is being done? urethral stricture   Facility or Hospital where procedure/surgery will be performed: [facility]   Who is doing the procedure / surgery? Felix Lloyd   Date of surgery / procedure: 9/10/2024   Time of surgery / procedure: 10:20 am   Where do you plan to recover after surgery? at home with family        Fax number for surgical facility: Note does not need to be faxed, will be available electronically in Epic.    Assessment & Plan     The proposed surgical procedure is considered LOW risk.    Problem List Items Addressed This Visit       Essential Hypertension    Acute combined systolic and diastolic congestive heart failure (H)     Other Visit Diagnoses       Preop examination    -  Primary    Postprocedural male urethral stricture        Need for shingles vaccine        Need for vaccination against respiratory syncytial virus                This is a 68 yo male here for preop assessment prior to planned procedure for urethral stricture.  He has a history of significant illness a year ago that led to hypoxia; received Ceftriaxone and had anaphylaxis/respiratory failure.  Had complicated hospitalization.  Required catheterization - now has difficulty with urinary retention related to urethral stricture.      Other medical conditions:    Essential Hypertension - blood pressure is controlled.   CHF - generally stable  Pre-diabetes - A1c 6.3 today         - No identified additional risk factors other than previously addressed    Antiplatelet or Anticoagulation Medication Instructions   - apixaban (Eliquis), edoxaban (Savaysa), rivaroxaban  (Xarelto): Bleeding risk is moderate or high for this procedure AND CrCl  (>=) 50 mL/min. DO NOT TAKE 2 days before surgery.     Additional Medication Instructions   - Beta Blockers: Continue taking on the day of surgery.   - Diuretics: DO NOT TAKE on the day of surgery.    Recommendation  Approval given to proceed with proposed procedure, without further diagnostic evaluation.    Yazmin Ly is a 69 year old, presenting for the following:  Pre-Op Exam and Back Pain (Pt stated pain in lower back not sure if bladder acting up )          8/21/2024     9:01 AM   Additional Questions   Roomed by Cindy SANCHEZ related to upcoming procedure: patient had previous bladder catheterization - left some scarring - has post void residual; exam shows urethral stricture; scheduled for procedure to relieve stricture (Optilume)         8/21/2024   Pre-Op Questionnaire   Have you ever had a heart attack or stroke? (!) YES - anaphylaxis to Ceftriaxone - with intubation, CHF; bilateral pulmonary emboli   Have you ever had surgery on your heart or blood vessels, such as a stent placement, a coronary artery bypass, or surgery on an artery in your head, neck, heart, or legs? No   Do you have chest pain with activity? No   Do you have a history of heart failure? No   Do you currently have a cold, bronchitis or symptoms of other infection? No   Do you have a cough, shortness of breath, or wheezing? No   Do you or anyone in your family have previous history of blood clots? Multiple bilateral pulmonary emboli   Do you or does anyone in your family have a serious bleeding problem such as prolonged bleeding following surgeries or cuts? No   Have you ever had problems with anemia or been told to take iron pills? No   Have you had any abnormal blood loss such as black, tarry or bloody stools? No   Have you ever had a blood transfusion? (!) UNKNOWN   Are you willing to have a blood transfusion if it is medically needed before, during, or  after your surgery? Yes   Have you or any of your relatives ever had problems with anesthesia? No   Do you have sleep apnea, excessive snoring or daytime drowsiness? No   Do you have any artifical heart valves or other implanted medical devices like a pacemaker, defibrillator, or continuous glucose monitor? No   Do you have artificial joints? No   Are you allergic to latex? No        Health Care Directive  Patient does not have a Health Care Directive or Living Will: no written document      Preoperative Review of    reviewed - no record of controlled substances prescribed.      Status of Chronic Conditions:  See problem list for active medical problems.  Problems all longstanding and stable, except as noted/documented.  See ROS for pertinent symptoms related to these conditions.    Patient Active Problem List    Diagnosis Date Noted    Bilateral pulmonary embolism (H) 01/29/2024     Priority: Medium    Acute respiratory failure with hypercapnia (H) 01/29/2024     Priority: Medium    Acute combined systolic and diastolic congestive heart failure (H) 01/29/2024     Priority: Medium    Thrombocytopenia (H24) 01/29/2024     Priority: Medium    Primary osteoarthritis of both knees 02/19/2019     Priority: Medium    Morbid obesity (H)      Priority: Medium     Created by Conversion        Essential Hypertension      Priority: Medium     Created by Conversion  Replacement Utility updated for latest IMO load        Seborrheic Keratosis      Priority: Medium     Created by Conversion        Knee Sprain      Priority: Medium     Created by Conversion          Past Medical History:   Diagnosis Date    Acute chest pain 12/22/2015    Cellulitis and abscess of unspecified site     Created by Conversion     Colon polyps     Cough     Created by Conversion     Hypertension      Past Surgical History:   Procedure Laterality Date    ARTHROSCOPY KNEE Left 2012     Current Outpatient Medications   Medication Sig Dispense Refill  "   atenolol (TENORMIN) 25 MG tablet Take 1 tablet (25 mg) by mouth daily 90 tablet 3    bumetanide (BUMEX) 1 MG tablet Take 1 tablet (1 mg) by mouth 2 times daily 180 tablet 1    ELIQUIS ANTICOAGULANT 5 MG tablet TAKE 1 TABLET BY MOUTH TWICE DAILY 60 tablet 1    hydrochlorothiazide (MICROZIDE) 12.5 MG capsule Take 1 capsule by mouth every morning      spironolactone (ALDACTONE) 25 MG tablet TAKE 2 TABLETS BY MOUTH EVERY MORNING 180 tablet 1       Allergies   Allergen Reactions    Ceftriaxone Anaphylaxis        Social History     Tobacco Use    Smoking status: Former     Types: Cigarettes    Smokeless tobacco: Never   Substance Use Topics    Alcohol use: Not on file     Family History   Problem Relation Age of Onset    Coronary Artery Disease Father         s/p stents, CABG    Coronary Artery Disease Brother         s/p stents    Cancer Mother     Parkinsonism Paternal Grandmother     Diabetes Type 2  Paternal Grandmother      History   Drug Use Not on file           Review of Systems   Constitutional:  Negative for chills and fever.   HENT:  Negative for ear pain and sore throat.    Eyes:  Negative for pain and visual disturbance.   Respiratory:  Negative for cough and shortness of breath.    Cardiovascular:  Negative for chest pain and palpitations.   Gastrointestinal:  Negative for abdominal pain and vomiting.   Genitourinary:  Positive for difficulty urinating (known urethral stricture). Negative for dysuria and hematuria.   Musculoskeletal:  Negative for arthralgias and back pain.   Skin:  Negative for color change and rash.   Neurological:  Negative for seizures and syncope.   Hematological:  Bruises/bleeds easily (on Eliquis).   Psychiatric/Behavioral: Negative.     All other systems reviewed and are negative.        Objective    /74 (BP Location: Left arm, Patient Position: Sitting, Cuff Size: Adult Large)   Pulse 83   Temp 97  F (36.1  C) (Temporal)   Resp 20   Ht 1.75 m (5' 8.9\")   Wt (!) 150.1 " "kg (331 lb)   SpO2 93%   BMI 49.02 kg/m     Estimated body mass index is 49.02 kg/m  as calculated from the following:    Height as of this encounter: 1.75 m (5' 8.9\").    Weight as of this encounter: 150.1 kg (331 lb).  Physical Exam  Vitals reviewed.   Constitutional:       General: He is not in acute distress.     Appearance: Normal appearance. He is obese.   HENT:      Head: Normocephalic.      Right Ear: Tympanic membrane, ear canal and external ear normal.      Left Ear: Tympanic membrane, ear canal and external ear normal.      Nose: Nose normal.      Mouth/Throat:      Mouth: Mucous membranes are moist.      Pharynx: No posterior oropharyngeal erythema.   Eyes:      Extraocular Movements: Extraocular movements intact.      Conjunctiva/sclera: Conjunctivae normal.      Pupils: Pupils are equal, round, and reactive to light.   Cardiovascular:      Rate and Rhythm: Normal rate and regular rhythm.      Pulses: Normal pulses.      Heart sounds: Normal heart sounds. No murmur heard.  Pulmonary:      Effort: Pulmonary effort is normal.      Breath sounds: Normal breath sounds.   Abdominal:      Palpations: Abdomen is soft. There is no mass.      Tenderness: There is no abdominal tenderness. There is no guarding or rebound.   Musculoskeletal:         General: No deformity. Normal range of motion.      Cervical back: Normal range of motion and neck supple.   Lymphadenopathy:      Cervical: No cervical adenopathy.   Skin:     General: Skin is warm and dry.   Neurological:      General: No focal deficit present.      Mental Status: He is alert and oriented to person, place, and time.   Psychiatric:         Mood and Affect: Mood normal.         Behavior: Behavior normal.           Recent Labs   Lab Test 11/20/23  1628      POTASSIUM 4.7   CR 0.99        Diagnostics  Recent Results (from the past 168 hour(s))   EKG 12-lead, tracing only    Collection Time: 08/21/24  9:51 AM   Result Value Ref Range    Systolic " Blood Pressure  mmHg    Diastolic Blood Pressure  mmHg    Ventricular Rate 82 BPM    Atrial Rate 82 BPM    IA Interval 166 ms    QRS Duration 86 ms     ms    QTc 441 ms    P Axis 45 degrees    R AXIS 42 degrees    T Axis 60 degrees    Interpretation ECG       Sinus rhythm  Normal ECG  No previous ECGs available  Confirmed by ODILIA GALICIA MD LOC:JN ([id number]) on 8/21/2024 3:16:07 PM     TSH WITH FREE T4 REFLEX    Collection Time: 08/21/24 10:08 AM   Result Value Ref Range    TSH 3.12 0.30 - 4.20 uIU/mL   ALT    Collection Time: 08/21/24 10:08 AM   Result Value Ref Range    ALT 19 0 - 70 U/L   CBC with Platelets    Collection Time: 08/21/24 10:08 AM   Result Value Ref Range    WBC Count 9.2 4.0 - 11.0 10e3/uL    RBC Count 4.90 4.40 - 5.90 10e6/uL    Hemoglobin 15.0 13.3 - 17.7 g/dL    Hematocrit 45.6 40.0 - 53.0 %    MCV 93 78 - 100 fL    MCH 30.6 26.5 - 33.0 pg    MCHC 32.9 31.5 - 36.5 g/dL    RDW 15.0 10.0 - 15.0 %    Platelet Count 155 150 - 450 10e3/uL   Basic metabolic panel  (Ca, Cl, CO2, Creat, Gluc, K, Na, BUN)    Collection Time: 08/21/24 10:08 AM   Result Value Ref Range    Sodium 135 135 - 145 mmol/L    Potassium 4.7 3.4 - 5.3 mmol/L    Chloride 95 (L) 98 - 107 mmol/L    Carbon Dioxide (CO2) 27 22 - 29 mmol/L    Anion Gap 13 7 - 15 mmol/L    Urea Nitrogen 23.6 (H) 8.0 - 23.0 mg/dL    Creatinine 1.21 (H) 0.67 - 1.17 mg/dL    GFR Estimate 64 >60 mL/min/1.73m2    Calcium 9.5 8.8 - 10.4 mg/dL    Glucose 157 (H) 70 - 99 mg/dL    Patient Fasting > 8hrs? No    Lipid Profile (Chol, Trig, HDL, LDL calc)    Collection Time: 08/21/24 10:08 AM   Result Value Ref Range    Cholesterol 141 <200 mg/dL    Triglycerides 421 (H) <150 mg/dL    Direct Measure HDL 25 (L) >=40 mg/dL    LDL Cholesterol Calculated      Non HDL Cholesterol 116 <130 mg/dL    Patient Fasting > 8hrs? No    Hemoglobin A1c    Collection Time: 08/21/24 10:08 AM   Result Value Ref Range    Hemoglobin A1C 6.3 (H) 0.0 - 5.6 %          Revised Cardiac  Risk Index (RCRI)  The patient has the following serious cardiovascular risks for perioperative complications:   - No serious cardiac risks = 0 points     RCRI Interpretation: 1 point: Class II (low risk - 0.9% complication rate)         Signed Electronically by: MARLYN SAAVEDRA MD  A copy of this evaluation report is provided to the requesting physician.         Prior to immunization administration, verified patients identity using patient s name and date of birth. Please see Immunization Activity for additional information.     Screening Questionnaire for Adult Immunization    Are you sick today?   No   Do you have allergies to medications, food, a vaccine component or latex?   No   Have you ever had a serious reaction after receiving a vaccination?   No   Do you have a long-term health problem with heart, lung, kidney, or metabolic disease (e.g., diabetes), asthma, a blood disorder, no spleen, complement component deficiency, a cochlear implant, or a spinal fluid leak?  Are you on long-term aspirin therapy?   No   Do you have cancer, leukemia, HIV/AIDS, or any other immune system problem?   No   Do you have a parent, brother, or sister with an immune system problem?   No   In the past 3 months, have you taken medications that affect  your immune system, such as prednisone, other steroids, or anticancer drugs; drugs for the treatment of rheumatoid arthritis, Crohn s disease, or psoriasis; or have you had radiation treatments?   No   Have you had a seizure, or a brain or other nervous system problem?   No   During the past year, have you received a transfusion of blood or blood    products, or been given immune (gamma) globulin or antiviral drug?   No   For women: Are you pregnant or is there a chance you could become       pregnant during the next month?   No   Have you received any vaccinations in the past 4 weeks?   Yes     Immunization questionnaire was positive for at least one answer.  Notified  Dr Carreon.      Patient instructed to remain in clinic for 15 minutes afterwards, and to report any adverse reactions.     Screening performed by Cindy Aguirre on 8/21/2024 at 9:07 AM.

## 2024-08-22 LAB — HBA1C MFR BLD: 6.3 % (ref 0–5.6)

## 2024-08-26 DIAGNOSIS — I10 ESSENTIAL HYPERTENSION: ICD-10-CM

## 2024-08-26 RX ORDER — ATENOLOL 25 MG/1
25 TABLET ORAL DAILY
Qty: 90 TABLET | Refills: 3 | Status: SHIPPED | OUTPATIENT
Start: 2024-08-26

## 2024-09-09 DIAGNOSIS — I26.99 BILATERAL PULMONARY EMBOLISM (H): ICD-10-CM

## 2024-09-09 RX ORDER — APIXABAN 5 MG/1
5 TABLET, FILM COATED ORAL 2 TIMES DAILY
Qty: 60 TABLET | Refills: 4 | Status: SHIPPED | OUTPATIENT
Start: 2024-09-09

## 2024-09-10 ENCOUNTER — ANESTHESIA EVENT (OUTPATIENT)
Dept: SURGERY | Facility: HOSPITAL | Age: 69
End: 2024-09-10
Payer: MEDICARE

## 2024-09-10 ENCOUNTER — ANESTHESIA (OUTPATIENT)
Dept: SURGERY | Facility: HOSPITAL | Age: 69
End: 2024-09-10
Payer: MEDICARE

## 2024-09-10 ENCOUNTER — HOSPITAL ENCOUNTER (OUTPATIENT)
Facility: HOSPITAL | Age: 69
Discharge: HOME | End: 2024-09-10
Attending: UROLOGY | Admitting: UROLOGY
Payer: MEDICARE

## 2024-09-10 ENCOUNTER — APPOINTMENT (OUTPATIENT)
Dept: RADIOLOGY | Facility: HOSPITAL | Age: 69
End: 2024-09-10
Attending: UROLOGY
Payer: MEDICARE

## 2024-09-10 VITALS
TEMPERATURE: 98.3 F | HEART RATE: 74 BPM | DIASTOLIC BLOOD PRESSURE: 94 MMHG | WEIGHT: 315 LBS | SYSTOLIC BLOOD PRESSURE: 174 MMHG | BODY MASS INDEX: 46.65 KG/M2 | HEIGHT: 69 IN | OXYGEN SATURATION: 99 % | RESPIRATION RATE: 14 BRPM

## 2024-09-10 DIAGNOSIS — N99.111 POSTPROCEDURAL BULBOUS URETHRAL STRICTURE: Primary | ICD-10-CM

## 2024-09-10 PROCEDURE — 710N000012 HC RECOVERY PHASE 2, PER MINUTE: Performed by: UROLOGY

## 2024-09-10 PROCEDURE — 710N000009 HC RECOVERY PHASE 1, LEVEL 1, PER MIN: Performed by: UROLOGY

## 2024-09-10 PROCEDURE — 999N000180 XR SURGERY CARM FLUORO LESS THAN 5 MIN: Mod: TC

## 2024-09-10 PROCEDURE — 272N000001 HC OR GENERAL SUPPLY STERILE: Performed by: UROLOGY

## 2024-09-10 PROCEDURE — 250N000011 HC RX IP 250 OP 636: Performed by: UROLOGY

## 2024-09-10 PROCEDURE — 250N000009 HC RX 250: Performed by: ANESTHESIOLOGY

## 2024-09-10 PROCEDURE — C1769 GUIDE WIRE: HCPCS | Performed by: UROLOGY

## 2024-09-10 PROCEDURE — 250N000009 HC RX 250: Performed by: NURSE ANESTHETIST, CERTIFIED REGISTERED

## 2024-09-10 PROCEDURE — 370N000017 HC ANESTHESIA TECHNICAL FEE, PER MIN: Performed by: UROLOGY

## 2024-09-10 PROCEDURE — 52281 CYSTOSCOPY AND TREATMENT: CPT | Performed by: NURSE ANESTHETIST, CERTIFIED REGISTERED

## 2024-09-10 PROCEDURE — 258N000003 HC RX IP 258 OP 636: Performed by: NURSE ANESTHETIST, CERTIFIED REGISTERED

## 2024-09-10 PROCEDURE — 52281 CYSTOSCOPY AND TREATMENT: CPT | Performed by: ANESTHESIOLOGY

## 2024-09-10 PROCEDURE — 250N000025 HC SEVOFLURANE, PER MIN: Performed by: UROLOGY

## 2024-09-10 PROCEDURE — 250N000011 HC RX IP 250 OP 636: Performed by: NURSE ANESTHETIST, CERTIFIED REGISTERED

## 2024-09-10 PROCEDURE — 999N000141 HC STATISTIC PRE-PROCEDURE NURSING ASSESSMENT: Performed by: UROLOGY

## 2024-09-10 PROCEDURE — 360N000082 HC SURGERY LEVEL 2 W/ FLUORO, PER MIN: Performed by: UROLOGY

## 2024-09-10 PROCEDURE — 255N000002 HC RX 255 OP 636: Performed by: UROLOGY

## 2024-09-10 PROCEDURE — 258N000003 HC RX IP 258 OP 636: Performed by: ANESTHESIOLOGY

## 2024-09-10 RX ORDER — CEFAZOLIN SODIUM/WATER 3 G/30 ML
3 SYRINGE (ML) INTRAVENOUS ONCE
Status: DISCONTINUED | OUTPATIENT
Start: 2024-09-10 | End: 2024-09-10 | Stop reason: HOSPADM

## 2024-09-10 RX ORDER — ONDANSETRON 2 MG/ML
INJECTION INTRAMUSCULAR; INTRAVENOUS PRN
Status: DISCONTINUED | OUTPATIENT
Start: 2024-09-10 | End: 2024-09-10

## 2024-09-10 RX ORDER — CEFAZOLIN SODIUM/WATER 3 G/30 ML
3 SYRINGE (ML) INTRAVENOUS
Status: DISCONTINUED | OUTPATIENT
Start: 2024-09-10 | End: 2024-09-10

## 2024-09-10 RX ORDER — FENTANYL CITRATE 50 UG/ML
25 INJECTION, SOLUTION INTRAMUSCULAR; INTRAVENOUS EVERY 5 MIN PRN
Status: DISCONTINUED | OUTPATIENT
Start: 2024-09-10 | End: 2024-09-10 | Stop reason: HOSPADM

## 2024-09-10 RX ORDER — NALOXONE HYDROCHLORIDE 0.4 MG/ML
0.4 INJECTION, SOLUTION INTRAMUSCULAR; INTRAVENOUS; SUBCUTANEOUS
Status: DISCONTINUED | OUTPATIENT
Start: 2024-09-10 | End: 2024-09-10 | Stop reason: HOSPADM

## 2024-09-10 RX ORDER — SODIUM CHLORIDE, SODIUM LACTATE, POTASSIUM CHLORIDE, CALCIUM CHLORIDE 600; 310; 30; 20 MG/100ML; MG/100ML; MG/100ML; MG/100ML
INJECTION, SOLUTION INTRAVENOUS CONTINUOUS PRN
Status: DISCONTINUED | OUTPATIENT
Start: 2024-09-10 | End: 2024-09-10

## 2024-09-10 RX ORDER — LEVOFLOXACIN 5 MG/ML
500 INJECTION, SOLUTION INTRAVENOUS ONCE
Status: DISCONTINUED | OUTPATIENT
Start: 2024-09-10 | End: 2024-09-10

## 2024-09-10 RX ORDER — ACETAMINOPHEN 325 MG/1
650 TABLET ORAL EVERY 6 HOURS PRN
Status: DISCONTINUED | OUTPATIENT
Start: 2024-09-10 | End: 2024-09-10 | Stop reason: HOSPADM

## 2024-09-10 RX ORDER — NALOXONE HYDROCHLORIDE 0.4 MG/ML
0.2 INJECTION, SOLUTION INTRAMUSCULAR; INTRAVENOUS; SUBCUTANEOUS
Status: DISCONTINUED | OUTPATIENT
Start: 2024-09-10 | End: 2024-09-10 | Stop reason: HOSPADM

## 2024-09-10 RX ORDER — CEFAZOLIN SODIUM/WATER 3 G/30 ML
3 SYRINGE (ML) INTRAVENOUS SEE ADMIN INSTRUCTIONS
Status: DISCONTINUED | OUTPATIENT
Start: 2024-09-10 | End: 2024-09-10

## 2024-09-10 RX ORDER — ONDANSETRON 2 MG/ML
4 INJECTION INTRAMUSCULAR; INTRAVENOUS EVERY 30 MIN PRN
Status: DISCONTINUED | OUTPATIENT
Start: 2024-09-10 | End: 2024-09-10 | Stop reason: HOSPADM

## 2024-09-10 RX ORDER — SODIUM CHLORIDE, SODIUM LACTATE, POTASSIUM CHLORIDE, CALCIUM CHLORIDE 600; 310; 30; 20 MG/100ML; MG/100ML; MG/100ML; MG/100ML
INJECTION, SOLUTION INTRAVENOUS CONTINUOUS
Status: DISCONTINUED | OUTPATIENT
Start: 2024-09-10 | End: 2024-09-10 | Stop reason: HOSPADM

## 2024-09-10 RX ORDER — CEFAZOLIN SODIUM/WATER 3 G/30 ML
3 SYRINGE (ML) INTRAVENOUS SEE ADMIN INSTRUCTIONS
Status: DISCONTINUED | OUTPATIENT
Start: 2024-09-10 | End: 2024-09-10 | Stop reason: HOSPADM

## 2024-09-10 RX ORDER — IPRATROPIUM BROMIDE AND ALBUTEROL SULFATE 2.5; .5 MG/3ML; MG/3ML
3 SOLUTION RESPIRATORY (INHALATION) ONCE
Status: COMPLETED | OUTPATIENT
Start: 2024-09-10 | End: 2024-09-10

## 2024-09-10 RX ORDER — FENTANYL CITRATE 50 UG/ML
INJECTION, SOLUTION INTRAMUSCULAR; INTRAVENOUS PRN
Status: DISCONTINUED | OUTPATIENT
Start: 2024-09-10 | End: 2024-09-10

## 2024-09-10 RX ORDER — PROPOFOL 10 MG/ML
INJECTION, EMULSION INTRAVENOUS PRN
Status: DISCONTINUED | OUTPATIENT
Start: 2024-09-10 | End: 2024-09-10

## 2024-09-10 RX ORDER — LIDOCAINE 40 MG/G
CREAM TOPICAL
Status: DISCONTINUED | OUTPATIENT
Start: 2024-09-10 | End: 2024-09-10 | Stop reason: HOSPADM

## 2024-09-10 RX ORDER — HYDROCODONE BITARTRATE AND ACETAMINOPHEN 5; 325 MG/1; MG/1
1 TABLET ORAL EVERY 4 HOURS PRN
Status: DISCONTINUED | OUTPATIENT
Start: 2024-09-10 | End: 2024-09-10 | Stop reason: HOSPADM

## 2024-09-10 RX ORDER — LEVOFLOXACIN 5 MG/ML
500 INJECTION, SOLUTION INTRAVENOUS EVERY 24 HOURS
Status: DISCONTINUED | OUTPATIENT
Start: 2024-09-10 | End: 2024-09-10

## 2024-09-10 RX ORDER — NALOXONE HYDROCHLORIDE 1 MG/ML
0.1 INJECTION INTRAMUSCULAR; INTRAVENOUS; SUBCUTANEOUS
Status: DISCONTINUED | OUTPATIENT
Start: 2024-09-10 | End: 2024-09-10 | Stop reason: HOSPADM

## 2024-09-10 RX ORDER — FENTANYL CITRATE 50 UG/ML
50 INJECTION, SOLUTION INTRAMUSCULAR; INTRAVENOUS EVERY 5 MIN PRN
Status: DISCONTINUED | OUTPATIENT
Start: 2024-09-10 | End: 2024-09-10 | Stop reason: HOSPADM

## 2024-09-10 RX ORDER — LIDOCAINE HYDROCHLORIDE 10 MG/ML
INJECTION, SOLUTION INFILTRATION; PERINEURAL PRN
Status: DISCONTINUED | OUTPATIENT
Start: 2024-09-10 | End: 2024-09-10

## 2024-09-10 RX ORDER — TOLTERODINE TARTRATE 1 MG/1
2 TABLET, EXTENDED RELEASE ORAL 2 TIMES DAILY PRN
Status: DISCONTINUED | OUTPATIENT
Start: 2024-09-10 | End: 2024-09-10 | Stop reason: HOSPADM

## 2024-09-10 RX ORDER — ONDANSETRON 4 MG/1
4 TABLET, ORALLY DISINTEGRATING ORAL EVERY 30 MIN PRN
Status: DISCONTINUED | OUTPATIENT
Start: 2024-09-10 | End: 2024-09-10 | Stop reason: HOSPADM

## 2024-09-10 RX ORDER — DEXAMETHASONE SODIUM PHOSPHATE 4 MG/ML
4 INJECTION, SOLUTION INTRA-ARTICULAR; INTRALESIONAL; INTRAMUSCULAR; INTRAVENOUS; SOFT TISSUE
Status: DISCONTINUED | OUTPATIENT
Start: 2024-09-10 | End: 2024-09-10 | Stop reason: HOSPADM

## 2024-09-10 RX ORDER — HYDROCODONE BITARTRATE AND ACETAMINOPHEN 5; 325 MG/1; MG/1
1 TABLET ORAL EVERY 6 HOURS PRN
Qty: 10 TABLET | Refills: 0 | Status: SHIPPED | OUTPATIENT
Start: 2024-09-10

## 2024-09-10 RX ADMIN — SODIUM CHLORIDE, POTASSIUM CHLORIDE, SODIUM LACTATE AND CALCIUM CHLORIDE: 600; 310; 30; 20 INJECTION, SOLUTION INTRAVENOUS at 09:55

## 2024-09-10 RX ADMIN — SODIUM CHLORIDE, POTASSIUM CHLORIDE, SODIUM LACTATE AND CALCIUM CHLORIDE: 600; 310; 30; 20 INJECTION, SOLUTION INTRAVENOUS at 10:12

## 2024-09-10 RX ADMIN — DEXMEDETOMIDINE HYDROCHLORIDE 20 MCG: 100 INJECTION, SOLUTION INTRAVENOUS at 10:09

## 2024-09-10 RX ADMIN — ONDANSETRON 4 MG: 2 INJECTION INTRAMUSCULAR; INTRAVENOUS at 11:04

## 2024-09-10 RX ADMIN — FENTANYL CITRATE 50 MCG: 50 INJECTION INTRAMUSCULAR; INTRAVENOUS at 10:09

## 2024-09-10 RX ADMIN — LIDOCAINE HYDROCHLORIDE 5 ML: 10 INJECTION, SOLUTION INFILTRATION; PERINEURAL at 10:23

## 2024-09-10 RX ADMIN — Medication 150 MG: at 10:23

## 2024-09-10 RX ADMIN — PROPOFOL 200 MG: 10 INJECTION, EMULSION INTRAVENOUS at 10:23

## 2024-09-10 RX ADMIN — IPRATROPIUM BROMIDE AND ALBUTEROL SULFATE 3 ML: .5; 3 SOLUTION RESPIRATORY (INHALATION) at 14:39

## 2024-09-10 RX ADMIN — PROPOFOL 50 MG: 10 INJECTION, EMULSION INTRAVENOUS at 10:47

## 2024-09-10 RX ADMIN — Medication 3 G: at 10:18

## 2024-09-10 ASSESSMENT — ACTIVITIES OF DAILY LIVING (ADL)
ADLS_ACUITY_SCORE: 22
ADLS_ACUITY_SCORE: 29
ADLS_ACUITY_SCORE: 22
ADLS_ACUITY_SCORE: 22

## 2024-09-10 NOTE — OP NOTE
Location: [facility]     Patient Name: Miguel Baca  Patient : 1955  Patient MRN: [id number]  Patient CSN: [id number]  Patient PCP: Yahaira Fernandez    Date: 09/10/24     Pre-operative diagnosis: Bulbar urethral stricture     Post-operative diagnosis: Bulbar urethral stricture     Procedure:  Cystoscopy, retrograde urethrogram, urethral dilation, Optilume urethral balloon dilation     Surgeon: Dr. Felix Lloyd     EBL: 5 mL     Anesthesia: General     Indication: 69 year old male who developed a bulbar urethral stricture after a routine office cystoscope on 23. We were following his stricture as he denied significant obstructive symptoms but his PVR was rising.  Cystoscopy on 24 confirmed worsening stricture disease. I recommended urethral dilation with the Optilume balloon. Risks, benefits, and alternatives to treatment were discussed with the patient and the patient elected to proceed.     Findings: ~2 cm bulbar urethral seen on retrograde urethrogram.  Verified with cystoscopy.  Stricture appeared to be about 8-10 Fr.  Urethral stricture dilated easily with Ovalle sounds.     Prostate had bilobar hyperplasia. Severe bladder neck elevation.  Unable to examine anterior bladder wall with the 30 degree lens. Able to see anterior wall with 70 degree lens.  No bladder tumors or stones seen. Bilateral ureteral orifices normal. Mild bladder trabeculation present.  Balloon dilation was uneventful and appeared successful.     Specimens: None     Procedure:  After written informed consent was obtained the patient was brought into the operating room.  The patient was properly identified prior to the procedure, received preprocedure antibiotics, and had sequential compression devices on the legs.  The patient was then induced under anesthesia.     The patient was placed in dorsal lithotomy position and prepped and draped in the usual sterile fashion.     Retrograde urethrogram was  performed using a catheter tip syringe with contrast and fluoroscopy.     Cystoscopy was performed to the level of the urethral stricture. A [email or web address] wire was passed into the bladder across the stricture.  The urethral stricture was dilated from 12 Fr to 22 Fr using Ovalle sounds.  Completion cystoscopy was performed. Over the wire, the 30 Fr x 5 cm Optilume balloon dilator was placed under fluoroscopy guidance to overlap the entire stricture. Prior to inflation, cystoscopy was performed to verify placement and overlap with the balloon of the distal end of the stricture. Inflation of the balloon was done under fluoroscopy and direct vision. Dwell time was 5 minutes. The balloon was removed.  A 16 Fr Councill catheter was placed over the wire and into the bladder with 10 mL of water in the balloon.     At this point, the procedure was completed.  Patient tolerated the procedure well.     Plan: Home. Remove catheter at home on 9/11/24. Restart Eliquis on 9/12/24.  Follow up 10/10/24.    Felix Lloyd MD   11:40 AM

## 2024-09-10 NOTE — PROGRESS NOTES
Dr. Roque notified of patient's inability to maintain O2 sats on room air at 90 or above despite use of IS.  See orders for neb treatment.

## 2024-09-10 NOTE — INTERVAL H&P NOTE
"I have reviewed the surgical (or preoperative) H&P that is linked to this encounter, and examined the patient. There are no significant changes    Clinical Conditions Present on Arrival:  Clinically Significant Risk Factors Present on Admission         # Hyponatremia: Lowest Na = 135 mmol/L in last 30 days, will monitor as appropriate        # Drug Induced Coagulation Defect: home medication list includes an anticoagulant medication       # Severe Obesity: Estimated body mass index is 49.79 kg/m  as calculated from the following:    Height as of this encounter: 1.753 m (5' 9\").    Weight as of this encounter: 152.9 kg (337 lb 3 oz).       "

## 2024-09-10 NOTE — ANESTHESIA POSTPROCEDURE EVALUATION
Patient: Miguel Baca    Procedure: Procedure(s):  CYSTOSCOPY,  RETROGRADE URETHROGRAM, URETHRAL DILATION, OPTILUME BALLOON URETHRAL DILATION       Anesthesia Type:  General    Note:  Disposition: Outpatient   Postop Pain Control: Uneventful            Sign Out: Well controlled pain   PONV: No   Neuro/Psych: Uneventful            Sign Out: Acceptable/Baseline neuro status   Airway/Respiratory: Uneventful            Sign Out: Acceptable/Baseline resp. status   CV/Hemodynamics: Uneventful            Sign Out: Acceptable CV status   Other NRE:    DID A NON-ROUTINE EVENT OCCUR?     Event details/Postop Comments:  Satting high 90s on RA after walking. Feeling well. Ok to discharge.            Last vitals:  Vitals Value Taken Time   /67 09/10/24 1215   Temp 36.4  C (97.6  F) 09/10/24 1215   Pulse 82 09/10/24 1220   Resp 21 09/10/24 1208   SpO2 91 % 09/10/24 1220   Vitals shown include unfiled device data.    Electronically Signed By: Sascha Duarte MD  September 10, 2024  3:33 PM

## 2024-09-10 NOTE — OR NURSING
Marika has +JOSE ELIAS (he does not have a CPAP at home) sats on room air 86% to 88%. No dyspnea at rest, lungs diminished. I discussed this with Dr Roque (anesthesiologist) and she placed orders/parameters to be met before his discharge here in Phase II.

## 2024-09-10 NOTE — OR NURSING
Patient in PACU with severe JOSE ELIAS, does not use CPAP. Patient arrival on 8L face mask satting around 88-90%. RN switched to oxymask on 15L, patient still satting 90%. Dr. Wojciech Roque at bedside, instructed to use incentive spirometer. Patient can only get to 1,000L 2 reps.

## 2024-09-10 NOTE — ANESTHESIA PROCEDURE NOTES
Airway       Patient location during procedure: OR       Procedure Start/Stop Times: 9/10/2024 10:24 AM  Staff -        CRNA: Chase Mallory APRN CRNA       Performed By: CRNA  Consent for Airway        Urgency: elective  Indications and Patient Condition       Indications for airway management: javi-procedural         Mask difficulty assessment: 2 - vent by mask + OA or adjuvant +/- NMBA    Final Airway Details       Final airway type: endotracheal airway       Successful airway: ETT - single  Endotracheal Airway Details        ETT size (mm): 7.5       Cuffed: yes       Successful intubation technique: video laryngoscopy       VL Blade Size: Glidescope 4       Grade View of Cords: 1       Adjucts: stylet       Position: Right       Measured from: lips       Secured at (cm): 23       Bite block used: None    Post intubation assessment        Placement verified by: capnometry, equal breath sounds and chest rise        Number of attempts at approach: 1       Number of other approaches attempted: 0       Secured with: silk tape       Ease of procedure: easy       Dentition: Intact    Medication(s) Administered   Medication Administration Time: 9/10/2024 10:24 AM

## 2024-09-10 NOTE — PROGRESS NOTES
Patient's O2 sats have been consistently 95 - 97% on room air after nebulizer treatment and ambulation.

## 2024-09-10 NOTE — ANESTHESIA PREPROCEDURE EVALUATION
Anesthesia Pre-Procedure Evaluation    Patient: Miguel Baca   MRN: [id number] : 1955        Procedure : Procedure(s):  CYSTOSCOPY,  RETROGRADE URETHROGRAM, URETHRAL DILATION, OPTILUME BALLOON URETHRAL DILATION          Past Medical History:   Diagnosis Date    Acute chest pain 2015    Acute combined systolic and diastolic congestive heart failure (H)     Cellulitis and abscess of unspecified site     Created by Conversion     Colon polyps     Cough     Created by Conversion     Hypertension     Morbid obesity (H)     Primary osteoarthritis of both knees     Pulmonary embolism (H)     Seborrheic keratosis     Thrombocytopenia (H24)     Urethral stricture       Past Surgical History:   Procedure Laterality Date    ARTHROSCOPY KNEE Left       Allergies   Allergen Reactions    Ceftriaxone Anaphylaxis      Social History     Tobacco Use    Smoking status: Former     Types: Cigarettes    Smokeless tobacco: Never   Substance Use Topics    Alcohol use: Not on file      Wt Readings from Last 1 Encounters:   24 (!) 150.1 kg (331 lb)        Anesthesia Evaluation   Pt has had prior anesthetic. Type: General.    No history of anesthetic complications       ROS/MED HX  ENT/Pulmonary:     (+) sleep apnea, severe, uses CPAP,                                      Neurologic:  - neg neurologic ROS     Cardiovascular: Comment: Has not taken Bumex x1.5 days, has been NPO nearly 24 hrs, including liquids    (+) Dyslipidemia hypertension- -   -  - -   Taking blood thinners   CHF (acute RV in 2023/ multiple bilateral PE. LVEF preserved. Well compensated and recovered to date)                                METS/Exercise Tolerance:     Hematologic:     (+) History of blood clots,    pt is anticoagulated,        (-) anemia   Musculoskeletal:   (+)  arthritis,             GI/Hepatic:    (-) GERD and esophageal disease   Renal/Genitourinary: Comment: Urethral stricture following prolonged catheterization s/f  "cysto/dilation      Endo:     (+)               Obesity (BMI 49),       Psychiatric/Substance Use:  - neg psychiatric ROS     Infectious Disease:  - neg infectious disease ROS     Malignancy:  - neg malignancy ROS     Other:            Physical Exam    Airway      Comment: Redundant submental tissue    Mallampati: IV   TM distance: > 3 FB   Neck ROM: full   Mouth opening: > 3 cm    Respiratory Devices and Support         Dental       (+) Modest Abnormalities - crowns, retainers, 1 or 2 missing teeth      Cardiovascular   cardiovascular exam normal          Pulmonary   pulmonary exam normal                OUTSIDE LABS:  CBC:   Lab Results   Component Value Date    WBC 9.2 08/21/2024    WBC 7.0 07/10/2023    HGB 15.0 08/21/2024    HGB 16.8 07/10/2023    HCT 45.6 08/21/2024    HCT 53.0 07/10/2023     08/21/2024     (L) 07/10/2023     BMP:   Lab Results   Component Value Date     08/21/2024     11/20/2023    POTASSIUM 4.7 08/21/2024    POTASSIUM 4.7 11/20/2023    CHLORIDE 95 (L) 08/21/2024    CHLORIDE 96 (L) 11/20/2023    CO2 27 08/21/2024    CO2 27 11/20/2023    BUN 23.6 (H) 08/21/2024    BUN 17.5 11/20/2023    CR 1.21 (H) 08/21/2024    CR 0.99 11/20/2023     (H) 08/21/2024    GLC 95 11/20/2023     COAGS: No results found for: \"PTT\", \"INR\", \"FIBR\"  POC: No results found for: \"BGM\", \"HCG\", \"HCGS\"  HEPATIC:   Lab Results   Component Value Date    ALBUMIN 4.1 07/10/2023    PROTTOTAL 6.8 07/10/2023    ALT 19 08/21/2024    AST 43 07/10/2023    ALKPHOS 73 07/10/2023    BILITOTAL 0.8 07/10/2023     OTHER:   Lab Results   Component Value Date    A1C 6.3 (H) 08/21/2024    FILI 9.5 08/21/2024    TSH 3.12 08/21/2024       Anesthesia Plan    ASA Status:  3    NPO Status:  NPO Appropriate    Anesthesia Type: General.     - Airway: ETT   Induction: Intravenous, Propofol.   Maintenance: Inhalation.   Techniques and Equipment:     - Airway: Video-Laryngoscope       Consents    Anesthesia Plan(s) and " "associated risks, benefits, and realistic alternatives discussed. Questions answered and patient/representative(s) expressed understanding.     - Discussed:     - Discussed with:  Patient            Postoperative Care    Pain management: IV analgesics, Multi-modal analgesia.   PONV prophylaxis: Ondansetron (or other 5HT-3), Dexamethasone or Solumedrol     Comments:    Other Comments: GETA - glidescope, succinylcholine  Decadron 4, zofran 4             Abigail Roque MD    I have reviewed the pertinent notes and labs in the chart from the past 30 days and (re)examined the patient.  Any updates or changes from those notes are reflected in this note.     # Hyponatremia: Lowest Na = 135 mmol/L in last 30 days, will monitor as appropriate        # Drug Induced Coagulation Defect: home medication list includes an anticoagulant medication   # Severe Obesity: Estimated body mass index is 49.02 kg/m  as calculated from the following:    Height as of 8/21/24: 1.75 m (5' 8.9\").    Weight as of 8/21/24: 150.1 kg (331 lb).      "

## 2024-09-10 NOTE — ANESTHESIA CARE TRANSFER NOTE
Patient: Miguel Baca    Procedure: Procedure(s):  CYSTOSCOPY,  RETROGRADE URETHROGRAM, URETHRAL DILATION, OPTILUME BALLOON URETHRAL DILATION       Diagnosis: Other bulbous urethral stricture, male [N35.812]  Diagnosis Additional Information: No value filed.    Anesthesia Type:   General     Note:    Oropharynx: oropharynx clear of all foreign objects and spontaneously breathing  Level of Consciousness: drowsy  Oxygen Supplementation: face mask  Level of Supplemental Oxygen (L/min / FiO2): 8  Independent Airway: airway patency satisfactory and stable  Dentition: dentition unchanged  Vital Signs Stable: post-procedure vital signs reviewed and stable  Report to RN Given: handoff report given  Patient transferred to: PACU    Handoff Report: Identifed the Patient, Identified the Reponsible Provider, Reviewed the pertinent medical history, Discussed the surgical course, Reviewed Intra-OP anesthesia mangement and issues during anesthesia, Set expectations for post-procedure period and Allowed opportunity for questions and acknowledgement of understanding  Vitals:  Vitals Value Taken Time   /93 09/10/24 1122   Temp 36.3  C (97.4  F) 09/10/24 1121   Pulse 85 09/10/24 1122   Resp 16 09/10/24 1121   SpO2 90 % 09/10/24 1122   Vitals shown include unfiled device data.    Electronically Signed By: HILDA Emerson CRNA  September 10, 2024  11:24 AM

## 2024-10-10 ENCOUNTER — TRANSFERRED RECORDS (OUTPATIENT)
Dept: HEALTH INFORMATION MANAGEMENT | Facility: CLINIC | Age: 69
End: 2024-10-10
Payer: MEDICARE

## 2025-01-02 DIAGNOSIS — R60.0 BILATERAL LOWER EXTREMITY EDEMA: ICD-10-CM

## 2025-01-02 RX ORDER — SPIRONOLACTONE 25 MG/1
50 TABLET ORAL EVERY MORNING
Qty: 180 TABLET | Status: SHIPPED | OUTPATIENT
Start: 2025-01-02

## 2025-01-20 DIAGNOSIS — R60.0 BILATERAL LOWER EXTREMITY EDEMA: ICD-10-CM

## 2025-01-20 RX ORDER — BUMETANIDE 1 MG/1
1 TABLET ORAL 2 TIMES DAILY
Qty: 180 TABLET | Refills: 1 | Status: SHIPPED | OUTPATIENT
Start: 2025-01-20

## 2025-05-19 DIAGNOSIS — I26.99 BILATERAL PULMONARY EMBOLISM (H): ICD-10-CM

## 2025-05-19 RX ORDER — APIXABAN 5 MG/1
5 TABLET, FILM COATED ORAL 2 TIMES DAILY
Qty: 60 TABLET | Refills: 2 | Status: SHIPPED | OUTPATIENT
Start: 2025-05-19

## 2025-06-27 ENCOUNTER — OFFICE VISIT (OUTPATIENT)
Dept: FAMILY MEDICINE | Facility: CLINIC | Age: 70
End: 2025-06-27
Payer: MEDICARE

## 2025-06-27 ENCOUNTER — ANCILLARY PROCEDURE (OUTPATIENT)
Dept: GENERAL RADIOLOGY | Facility: CLINIC | Age: 70
End: 2025-06-27
Attending: FAMILY MEDICINE
Payer: MEDICARE

## 2025-06-27 ENCOUNTER — RESULTS FOLLOW-UP (OUTPATIENT)
Dept: FAMILY MEDICINE | Facility: CLINIC | Age: 70
End: 2025-06-27

## 2025-06-27 VITALS
BODY MASS INDEX: 46.65 KG/M2 | WEIGHT: 315 LBS | DIASTOLIC BLOOD PRESSURE: 64 MMHG | OXYGEN SATURATION: 97 % | SYSTOLIC BLOOD PRESSURE: 119 MMHG | TEMPERATURE: 98.3 F | HEART RATE: 67 BPM | HEIGHT: 69 IN | RESPIRATION RATE: 16 BRPM

## 2025-06-27 DIAGNOSIS — M25.561 CHRONIC PAIN OF BOTH KNEES: ICD-10-CM

## 2025-06-27 DIAGNOSIS — E11.65 TYPE 2 DIABETES MELLITUS WITH HYPERGLYCEMIA, WITHOUT LONG-TERM CURRENT USE OF INSULIN (H): ICD-10-CM

## 2025-06-27 DIAGNOSIS — G89.29 CHRONIC PAIN OF BOTH KNEES: ICD-10-CM

## 2025-06-27 DIAGNOSIS — M54.6 ACUTE RIGHT-SIDED THORACIC BACK PAIN: ICD-10-CM

## 2025-06-27 DIAGNOSIS — M25.562 CHRONIC PAIN OF BOTH KNEES: ICD-10-CM

## 2025-06-27 DIAGNOSIS — I11.0 HYPERTENSIVE HEART DISEASE WITH HEART FAILURE (H): ICD-10-CM

## 2025-06-27 DIAGNOSIS — Z13.6 SCREENING FOR AAA (ABDOMINAL AORTIC ANEURYSM): ICD-10-CM

## 2025-06-27 DIAGNOSIS — L98.9 SKIN LESION: ICD-10-CM

## 2025-06-27 DIAGNOSIS — L30.9 DERMATITIS: ICD-10-CM

## 2025-06-27 DIAGNOSIS — R60.0 BILATERAL LOWER EXTREMITY EDEMA: ICD-10-CM

## 2025-06-27 DIAGNOSIS — R73.9 ELEVATED RANDOM BLOOD GLUCOSE LEVEL: ICD-10-CM

## 2025-06-27 DIAGNOSIS — I10 ESSENTIAL HYPERTENSION: Primary | ICD-10-CM

## 2025-06-27 DIAGNOSIS — I50.9 ACUTE CONGESTIVE HEART FAILURE, UNSPECIFIED HEART FAILURE TYPE (H): ICD-10-CM

## 2025-06-27 DIAGNOSIS — Z12.11 SCREEN FOR COLON CANCER: ICD-10-CM

## 2025-06-27 DIAGNOSIS — Z23 NEED FOR VACCINATION: ICD-10-CM

## 2025-06-27 DIAGNOSIS — I26.99 BILATERAL PULMONARY EMBOLISM (H): ICD-10-CM

## 2025-06-27 PROBLEM — J96.02 ACUTE RESPIRATORY FAILURE WITH HYPERCAPNIA (H): Status: RESOLVED | Noted: 2024-01-29 | Resolved: 2025-06-27

## 2025-06-27 LAB
ALBUMIN SERPL BCG-MCNC: 4.1 G/DL (ref 3.5–5.2)
ALP SERPL-CCNC: 88 U/L (ref 40–150)
ALT SERPL W P-5'-P-CCNC: 29 U/L (ref 0–70)
ANION GAP SERPL CALCULATED.3IONS-SCNC: 12 MMOL/L (ref 7–15)
AST SERPL W P-5'-P-CCNC: 28 U/L (ref 0–45)
BILIRUB SERPL-MCNC: 0.7 MG/DL
BUN SERPL-MCNC: 16.4 MG/DL (ref 8–23)
CALCIUM SERPL-MCNC: 9.5 MG/DL (ref 8.8–10.4)
CHLORIDE SERPL-SCNC: 97 MMOL/L (ref 98–107)
CHOLEST SERPL-MCNC: 154 MG/DL
CREAT SERPL-MCNC: 1.02 MG/DL (ref 0.67–1.17)
EGFRCR SERPLBLD CKD-EPI 2021: 79 ML/MIN/1.73M2
ERYTHROCYTE [DISTWIDTH] IN BLOOD BY AUTOMATED COUNT: 16.2 % (ref 10–15)
EST. AVERAGE GLUCOSE BLD GHB EST-MCNC: 157 MG/DL
FASTING STATUS PATIENT QL REPORTED: YES
FASTING STATUS PATIENT QL REPORTED: YES
GLUCOSE SERPL-MCNC: 135 MG/DL (ref 70–99)
HBA1C MFR BLD: 7.1 % (ref 0–5.6)
HCO3 SERPL-SCNC: 28 MMOL/L (ref 22–29)
HCT VFR BLD AUTO: 54.5 % (ref 40–53)
HDLC SERPL-MCNC: 27 MG/DL
HGB BLD-MCNC: 17.4 G/DL (ref 13.3–17.7)
LDLC SERPL CALC-MCNC: 89 MG/DL
MCH RBC QN AUTO: 30.6 PG (ref 26.5–33)
MCHC RBC AUTO-ENTMCNC: 31.9 G/DL (ref 31.5–36.5)
MCV RBC AUTO: 96 FL (ref 78–100)
NONHDLC SERPL-MCNC: 127 MG/DL
PLATELET # BLD AUTO: 137 10E3/UL (ref 150–450)
POTASSIUM SERPL-SCNC: 5 MMOL/L (ref 3.4–5.3)
PROT SERPL-MCNC: 7.3 G/DL (ref 6.4–8.3)
RBC # BLD AUTO: 5.68 10E6/UL (ref 4.4–5.9)
SODIUM SERPL-SCNC: 137 MMOL/L (ref 135–145)
TRIGL SERPL-MCNC: 189 MG/DL
WBC # BLD AUTO: 10 10E3/UL (ref 4–11)

## 2025-06-27 PROCEDURE — 3078F DIAST BP <80 MM HG: CPT | Performed by: FAMILY MEDICINE

## 2025-06-27 PROCEDURE — 3051F HG A1C>EQUAL 7.0%<8.0%: CPT | Performed by: FAMILY MEDICINE

## 2025-06-27 PROCEDURE — 36415 COLL VENOUS BLD VENIPUNCTURE: CPT | Performed by: FAMILY MEDICINE

## 2025-06-27 PROCEDURE — 85027 COMPLETE CBC AUTOMATED: CPT | Performed by: FAMILY MEDICINE

## 2025-06-27 PROCEDURE — 83036 HEMOGLOBIN GLYCOSYLATED A1C: CPT | Performed by: FAMILY MEDICINE

## 2025-06-27 PROCEDURE — 3074F SYST BP LT 130 MM HG: CPT | Performed by: FAMILY MEDICINE

## 2025-06-27 PROCEDURE — 80061 LIPID PANEL: CPT | Performed by: FAMILY MEDICINE

## 2025-06-27 PROCEDURE — 80053 COMPREHEN METABOLIC PANEL: CPT | Performed by: FAMILY MEDICINE

## 2025-06-27 PROCEDURE — 99214 OFFICE O/P EST MOD 30 MIN: CPT | Performed by: FAMILY MEDICINE

## 2025-06-27 PROCEDURE — 72070 X-RAY EXAM THORAC SPINE 2VWS: CPT | Mod: TC | Performed by: STUDENT IN AN ORGANIZED HEALTH CARE EDUCATION/TRAINING PROGRAM

## 2025-06-27 PROCEDURE — 3048F LDL-C <100 MG/DL: CPT | Performed by: FAMILY MEDICINE

## 2025-06-27 PROCEDURE — G2211 COMPLEX E/M VISIT ADD ON: HCPCS | Performed by: FAMILY MEDICINE

## 2025-06-27 RX ORDER — BUMETANIDE 1 MG/1
1 TABLET ORAL 2 TIMES DAILY
Qty: 180 TABLET | Refills: 3 | Status: ON HOLD | OUTPATIENT
Start: 2025-06-27

## 2025-06-27 RX ORDER — SPIRONOLACTONE 25 MG/1
50 TABLET ORAL EVERY MORNING
Qty: 180 TABLET | Refills: 3 | Status: ON HOLD | OUTPATIENT
Start: 2025-06-27

## 2025-06-27 RX ORDER — ATENOLOL 25 MG/1
25 TABLET ORAL DAILY
Qty: 90 TABLET | Refills: 3 | Status: ON HOLD | OUTPATIENT
Start: 2025-06-27

## 2025-06-27 RX ORDER — METFORMIN HYDROCHLORIDE 500 MG/1
500 TABLET, EXTENDED RELEASE ORAL
Qty: 180 TABLET | Refills: 1 | Status: ON HOLD | OUTPATIENT
Start: 2025-06-27

## 2025-06-27 NOTE — PATIENT INSTRUCTIONS
Take Acetaminophen/Tylenol (500 mg tablets), take 2 tablets three times a day (by  mouth) as needed for pain.      Use heating pad up to 15 minutes per hour as needed for pain    Use Tizanidine (prescription sent to pharmacy) at bedtime for muscle spasms.      Follow up in 3 months to recheck sugar numbers

## 2025-06-27 NOTE — PROGRESS NOTES
Assessment & Plan   Malachi is a 69 year old male with a past medical history of HTN, CHF, PE, and osteoarthritis here with two weeks of severe right sided back pain. Exam today is notable for tenderness to palpation over the right paraspinal muscles just inferior to the scapula and proximal to the thoracic spine. With negative Kaufman's sign, no spinous process tenderness, otherwise negative MSK exam, and thoracic spine Xray today negative for fracture, mostly likely a muscle strain. He does have various skin lesions that warrant evaluation by dermatology.     Essential hypertension  Since we have not seen Malachi in clinic since August of last year, will get appropriate HTN labs today. No need for medication adjustments today.    - Comprehensive metabolic panel (BMP + Alb, Alk Phos, ALT, AST, Total. Bili, TP)  - Lipid Profile (Chol, Trig, HDL, LDL calc)  - atenolol (TENORMIN) 25 MG tablet; Take 1 tablet (25 mg) by mouth daily.    Screen for colon cancer  Malachi reports he had a normal colonoscopy less than 10 years ago. Do not have those records, so will ask him to bring us that information to his next office visit.     Screening for AAA (abdominal aortic aneurysm)  Discussed screening with Malachi. He would like to schedule this another time.     Acute congestive heart failure, unspecified heart failure type (H)  He has some mild edema, but no shortness of breath or other symptoms of an exacerbation today.     Hypertensive heart disease with heart failure (H)  - CBC with platelets    Body mass index (BMI) 50.0-59.9, adult (H)    Acute right-sided thoracic back pain  Plan for Malachi's right-sided back pain is symptom management with Tylenol 1000 TID for the next few days, then prn. Also sent a prescription for tizanidine for nighttime muscle spasms interfering with his sleep. Gave instructions on heating pad use, decreasing strenuous movements, and gentle stretching with return precautions if symptoms do not improve or  "worsen.   - XR Thoracic Spine 2 Views; Future  - tiZANidine (ZANAFLEX) 4 MG tablet; Take 1 tablet (4 mg) by mouth nightly as needed for muscle spasms.    Elevated random blood glucose level  - Hemoglobin A1c    Skin lesion  Concerning lesion on the right lower face. He has never been seen by derm, so will refer.   - Adult Dermatology  Referral; Future    Dermatitis  Unspecified dermatitis on his bilateral forearms warrants dermatological evaluation.   - Adult Dermatology  Referral; Future    Chronic pain of both knees  Meetas chronic knee pain is worsening and now interfering with his daily activities. Will place ortho referral for further assessment.   - Orthopedic  Referral; Future    Type 2 diabetes mellitus with hyperglycemia, without long-term current use of insulin (H)  Meetas A1c today was 7.1% qualifying him for a diagnoses of DM2. Discussed lifestyle modifications and will start on metformin 500 mg BID and follow up in 3 months.   - metFORMIN (GLUCOPHAGE XR) 500 MG 24 hr tablet; Take 1 tablet (500 mg) by mouth daily (with dinner).    Bilateral lower extremity edema  No medication adjustments necessary today.   - bumetanide (BUMEX) 1 MG tablet; Take 1 tablet (1 mg) by mouth 2 times daily.  - spironolactone (ALDACTONE) 25 MG tablet; Take 2 tablets (50 mg) by mouth every morning.    Bilateral pulmonary embolism (H)  - apixaban ANTICOAGULANT (ELIQUIS ANTICOAGULANT) 5 MG tablet; Take 1 tablet (5 mg) by mouth 2 times daily.    BMI  Estimated body mass index is 50.65 kg/m  as calculated from the following:    Height as of this encounter: 1.753 m (5' 9\").    Weight as of this encounter: 155.6 kg (343 lb).   Weight management plan: Discussed healthy diet and exercise guidelines    Follow-up     Yazmin Ly is a 69 year old, presenting for the following health issues:  Recheck Medication and Pain (Right side pain for the last 2 weeks )        6/27/2025     7:19 AM   Additional " "Questions   Roomed by Laura     Pain    History of Present Illness       Reason for visit:  Right side pain by ribs  Symptom onset:  1-2 weeks ago  Symptoms include:  Pain on right side  Symptom intensity:  Moderate  Symptom progression:  Worsening  Had these symptoms before:  No   He is taking medications regularly.      Malachi is a 69 year old male with a past medical history of HTN, CHF, PE, and osteoarthritis presenting to follow up on medications and for evaluation of right sided back pain. Malachi reports the back pain first occurred two weeks ago. He states he went to bed one night without any pain and woke up with a \"tearing\" pain on the right side of his back just below the shoulder blade. The pain does radiate down his back and into his axilla. This pain has made movements like going from lying down to sitting very difficult and the pain is interfering with his sleep. His symptoms are not worse after eating and de has not tried anything for relief. He denies any precipitating injury or event, SOB, headache, or worsening edema. Malachi also notes bilateral knee pain that has been a long standing issue for him, but has been worsening recently. He has had steroid injections in knees in the past, but did not find them helpful. He hasn't taken any pain relievers as he was concerned they would interfere with his medications. Malachi is also interested in seeing a dermatologist for various skin concerns. He notes new plaques on his temples bilaterally and wonders about a scaly rash that has been on his arms for many years.     Review of Systems  Constitutional, HEENT, cardiovascular, pulmonary, GI, , musculoskeletal, neuro, skin, endocrine and psych systems are negative, except as otherwise noted.      Objective    /64   Pulse 67   Temp 98.3  F (36.8  C) (Oral)   Resp 16   Ht 1.753 m (5' 9\")   Wt (!) 155.6 kg (343 lb)   SpO2 97%   BMI 50.65 kg/m    Body mass index is 50.65 kg/m .  Physical Exam "   GENERAL: alert and no distress  EYES: Eyes grossly normal to inspection, PERRL and conjunctivae and sclerae normal  NECK: no adenopathy, no asymmetry, masses, or scars  RESP: lungs clear to auscultation - no rales, rhonchi or wheezes  CV: regular rate and rhythm, normal S1 S2, no S3 or S4, no murmur, click or rub, no peripheral edema  ABDOMEN: soft, nontender, no hepatosplenomegaly, no masses and bowel sounds normal  MS: normal passive and active range of motion of both shoulders. No tenderness over the bicipital groove, AC joint, or glenohumeral joint. Negative step off test bilaterally. The right paraspinal muscles are more pronounced that the left. There tenderness to palpation on the right back just inferior to the scapula and proximal to the thoracic spine. Spine exam shows mild kyphosis, no tenderness over the spinous processes of the thoracic spine.  SKIN: Many scattered seborrheic keratoses. There is a round ~2 cm lesion on the right lower face that is pink/red and telangiectatic, and has rolled borders. Bilateral forearms are covered with scaly, pale papules and plaques.   NEURO: Normal strength and tone, mentation intact and speech normal  PSYCH: mentation appears normal, affect normal/bright    Xray thoracic spine- Reviewed and interpreted by me.  No evidence of fracture. Some mild kyphosis and degenerative changes. Otherwise unremarkable Xray.         Signed Electronically by: MARLYN SAAVEDRA MD

## 2025-06-30 ENCOUNTER — PATIENT OUTREACH (OUTPATIENT)
Dept: CARE COORDINATION | Facility: CLINIC | Age: 70
End: 2025-06-30

## 2025-06-30 ENCOUNTER — TELEPHONE (OUTPATIENT)
Dept: DERMATOLOGY | Facility: CLINIC | Age: 70
End: 2025-06-30

## 2025-06-30 ENCOUNTER — OFFICE VISIT (OUTPATIENT)
Dept: DERMATOLOGY | Facility: CLINIC | Age: 70
End: 2025-06-30
Attending: FAMILY MEDICINE
Payer: MEDICARE

## 2025-06-30 ENCOUNTER — OFFICE VISIT (OUTPATIENT)
Dept: DERMATOLOGY | Facility: CLINIC | Age: 70
End: 2025-06-30
Payer: MEDICARE

## 2025-06-30 DIAGNOSIS — C44.319 BASAL CELL CARCINOMA (BCC) OF RIGHT CHEEK: Primary | ICD-10-CM

## 2025-06-30 DIAGNOSIS — L82.1 SEBORRHEIC KERATOSIS: Primary | ICD-10-CM

## 2025-06-30 DIAGNOSIS — D48.5 NEOPLASM OF UNCERTAIN BEHAVIOR OF SKIN: ICD-10-CM

## 2025-06-30 DIAGNOSIS — C44.319 BASAL CELL CARCINOMA (BCC) OF JAWLINE: ICD-10-CM

## 2025-06-30 DIAGNOSIS — L91.8 INFLAMED ACROCHORDON: ICD-10-CM

## 2025-06-30 PROCEDURE — 88331 PATH CONSLTJ SURG 1 BLK 1SPC: CPT | Performed by: DERMATOLOGY

## 2025-06-30 NOTE — LETTER
July 8, 2025    Miguel Baca  [address]    [place] [facility] [address]      Dear Miguel      You are scheduled for Mohs Surgery on 7/15/25 and 7/29/25 at 7:45, arrive at 7:30 am.     Please check in at 2nd floor Dermatology Clinic.     Be sure to eat a good breakfast and bathe and wash your hair prior to Surgery. Please bring  with you if this is above your neck    If you are taking any anti-coagulants that are prescribed by your Doctor (such as Coumadin/warfarin, Plavix, Aspirin, Ibuprofen), please continue taking them.     However, If you are taking anti-coagulants over the counter without  a Doctor's order for a Medical condition, please discontinue them 10 days prior to Surgery.      Please wear loose comfortable clothing as it could possibly be 4-6 hours until your surgery is completed depending upon how many layers of tissue need to be removed.     If you need any mobility assistance (getting on the exam chair or toilet) please bring a caregiver, family member, or staff member to assist you. We are not equipped to transfer patients.    Thank you,    Dougie Cazares MD          Electronically signed

## 2025-06-30 NOTE — TELEPHONE ENCOUNTER
----- Message from Dougie Cazares sent at 6/30/2025 11:26 AM CDT -----  R cheek basal cell carcinoma   R jawline basal cell carcinoma   Schedule excision

## 2025-06-30 NOTE — PROGRESS NOTES
R upper cheek (red):Orthokeratosis of epidermis with a proliferation of nests of basaloid cells, with peripheral palisading and a haphazard arrangement in the center extending into the dermis, forming nodules.  The tumor cells have hyperchromatic nuclei. Poor cytoplasm and intercellular bridging.    R jawline (blue):Orthokeratosis of epidermis with a proliferation of nests of basaloid cells, with peripheral palisading and a haphazard arrangement in the center extending into the dermis, forming nodules.  The tumor cells have hyperchromatic nuclei. Poor cytoplasm and intercellular bridging.    R cheek basal cell carcinoma   R jawline basal cell carcinoma   Schedule excision

## 2025-06-30 NOTE — LETTER
6/30/2025      Miguel Baca  [address] [place] [address] [address]  [facility] [address]      Dear Colleague,    Thank you for referring your patient, Miguel Baca, to the [facility]. Please see a copy of my visit note below.    [facility] Dermatology Note  Encounter Date: Jun 30, 2025  Office Visit     Reviewed patients past medical history and pertinent chart review prior to patients visit today.     Dermatology Problem List:  0. NUB, right upper cheek, s/p shave biopsy Jun 30, 2025   0. NUB, right jawline, s/p shave biopsy Jun 30, 2025     1. Inflamed acrochordons, bilateral axillae, removed 6/30/2025    Personal Hx: no personal history of skin cancer  Family Hx: Brother, history of skin cancer    ____________________________________________    CC: Derm Problem (Spot on arms spot on facex3)    HPI:  Mr. Miguel Baca is a(n) 69 year old male who presents today as a new patient due to a concern of several lesions. He has numerous bothersome lesions involving the axillae. The patient notes brown raised lesions on his bilateral temples. He also has a larger raised spot on the right jawline that has been present for quite some time. It initially presented like a pimple. Lastly, the patient has a concern of spots on the forearms.    Patient is otherwise feeling well, without additional skin concerns.    Medications:  Current Outpatient Medications   Medication Sig Dispense Refill     apixaban ANTICOAGULANT (ELIQUIS ANTICOAGULANT) 5 MG tablet Take 1 tablet (5 mg) by mouth 2 times daily. 180 tablet 3     atenolol (TENORMIN) 25 MG tablet Take 1 tablet (25 mg) by mouth daily. 90 tablet 3     bumetanide (BUMEX) 1 MG tablet Take 1 tablet (1 mg) by mouth 2 times daily. 180 tablet 3     metFORMIN (GLUCOPHAGE XR) 500 MG 24 hr tablet Take 1 tablet (500 mg) by mouth daily (with dinner). 180 tablet 1     spironolactone (ALDACTONE) 25 MG tablet Take 2 tablets (50 mg) by mouth every morning.  180 tablet 3     tiZANidine (ZANAFLEX) 4 MG tablet Take 1 tablet (4 mg) by mouth nightly as needed for muscle spasms. 30 tablet 1     No current facility-administered medications for this visit.      Past Medical History:   Patient Active Problem List   Diagnosis     Essential Hypertension     Seborrheic Keratosis     Knee Sprain     Primary osteoarthritis of both knees     Bilateral pulmonary embolism (H)     Acute combined systolic and diastolic congestive heart failure (H)     Thrombocytopenia     Acute congestive heart failure, unspecified heart failure type (H)     Hypertensive heart disease with heart failure (H)     Past Medical History:   Diagnosis Date     Acute chest pain 12/22/2015     Acute combined systolic and diastolic congestive heart failure (H)      Cellulitis and abscess of unspecified site     Created by Conversion      Colon polyps      Cough     Created by Conversion      Hypertension      Morbid obesity (H)      Primary osteoarthritis of both knees      Pulmonary embolism (H)      Skin cancer      Thrombocytopenia      Urethral stricture        ____________________________________________     Physical Exam:  Vitals: There were no vitals taken for this visit.   SKIN: The exam included face, neck, bilateral axillae, and forearms.  - Peace II.  - Right upper cheek, 0.4 cm pink shiny thin papule  - Right jawline, 1.5 cm pink, shiny plaque with multiple telangiectasias  - Face, including bilateral temples, brown waxy stuck on appearing papules consistent with seborrheic keratoses  - Bilateral forearms, numerous tan to brown waxy and crusted stuck on appearing papules consistent with seborrheic keratoses  - Bilateral axillae, flesh colored pedunculated irritated papules consistent with inflamed acrochordons     - No other lesions of concern on areas examined.               _________________________________________    Assessment & Plan:   # Neoplasm of uncertain behavior:  right upper cheek.   DDx includes basal cell carcinoma. Shave biopsy today.  # Neoplasm of uncertain behavior:  right jawline.  DDx includes basal cell carcinoma. Shave biopsy today.  Procedure Note: Biopsy by shave technique  The risks and benefits of the procedure were described to the patient. These include but are not limited to bleeding, infection, scar, incomplete removal, and non-diagnostic biopsy. Verbal informed consent was obtained. The above site(s) was cleansed with an alcohol pad and injected with 1% lidocaine with epinephrine. Once anesthesia was obtained, a biopsy(ies) was performed with DermaBlade. The tissue(s) was placed in a labeled container(s) with formalin and sent to pathology. Hemostasis was achieved with aluminum chloride, as well as electrodesiccation for the right jawline. Vaseline and a bandage were applied to the wound(s). The patient tolerated the procedure well and was given post biopsy care instructions.     Specimens sent to Dr. Cazares to read in house. Our team will be in contact with results.      # Seborrheic keratoses, face and bilateral forearms  - We discussed the benign nature of the skin lesions. No treatment required. Continued observation recommended. Follow up with any concerns or changes.      # Inflamed acrochordons, bilateral axillae x 25  The benign nature of the skin lesions was discussed with the patient.  Options were discussed including no treatment versus removal.  The patient requested removal of a total of 25 inflamed acrochordons due to irritation.    After being cleansed with an alcohol swab, the larger acrochordons were anaesthetized with lidocaine and epi and 25 acrochordons were removed with a sharp sterile scissors and forceps.  The patient tolerated the procedure well.  No complications.  Bandages applied where needed for hemostasis.     The patient will return in the next few weeks to month for a full skin check.  Additional inflamed acrochordons can be removed at that  time, if he wishes.      All risks, benefits and alternatives were discussed with patient.  Patient is in agreement and understands the assessment and plan.  All questions were answered.    Lynette Sue PA-C  [facility] Dermatology     Yahaira Fernandez MD  [address] [place], [place] [id number] on close of this encounter.    Again, thank you for allowing me to participate in the care of your patient.        Sincerely,        Lynette Sue PA-C    Electronically signed

## 2025-06-30 NOTE — LETTER
6/30/2025      Miguel Baca  [address]  [facility] [address]      Dear Colleague,    Thank you for referring your patient, Miguel Baca, to the [facility]. Please see a copy of my visit note below.    R upper cheek (red):Orthokeratosis of epidermis with a proliferation of nests of basaloid cells, with peripheral palisading and a haphazard arrangement in the center extending into the dermis, forming nodules.  The tumor cells have hyperchromatic nuclei. Poor cytoplasm and intercellular bridging.    R jawline (blue):Orthokeratosis of epidermis with a proliferation of nests of basaloid cells, with peripheral palisading and a haphazard arrangement in the center extending into the dermis, forming nodules.  The tumor cells have hyperchromatic nuclei. Poor cytoplasm and intercellular bridging.    R cheek basal cell carcinoma   R jawline basal cell carcinoma   Schedule excision     Again, thank you for allowing me to participate in the care of your patient.        Sincerely,        Dougie Cazares MD    Electronically signed

## 2025-06-30 NOTE — PATIENT INSTRUCTIONS
Form complete   Audrey called and notified via voicemail message and form mailed in envelope that they provided    Wound Care After a Biopsy    What is a skin biopsy?  A skin biopsy allows the doctor to examine a very small piece of tissue under the microscope to determine the diagnosis and the best treatment for the skin condition. A local anesthetic (numbing medicine) is injected with a very small needle into the skin area to be tested. A small piece of skin is taken from the area. Sometimes a suture (stitch) is used.     What are the risks of a skin biopsy?  I will experience scar, bleeding, swelling, pain, crusting and redness. I may experience incomplete removal or recurrence. Risks of this procedure are excessive bleeding, bruising, infection, nerve damage, numbness, thick (hypertrophic or keloidal) scar and non-diagnostic biopsy.    How should I care for my wound for the first 24 hours?  Keep the wound dry and covered for 24 hours  If it bleeds, hold direct pressure on the area for 15 minutes. If bleeding does not stop, call us or go to the emergency room  Avoid strenuous exercise the first 1-2 days or as your doctor instructs you    How should I care for the wound after 24 hours?  After 24 hours, remove the bandage  You may bathe or shower as normal  If you had a scalp biopsy, you can shampoo as usual and can use shower water to clean the biopsy site daily  Clean the wound once a day with gentle soap and water  Do not scrub, be gentle  Apply white petroleum/Vaseline after cleaning the wound with a cotton swab or a clean finger, and keep the site covered with a Bandaid /bandage. Bandages are not necessary with a scalp biopsy  If you are unable to cover the site with a Bandaid /bandage, re-apply ointment 2-3 times a day to keep the site moist. Moisture will help with healing  Avoid strenuous activity for first 1-2 days  Avoid lakes, rivers, pools, and oceans until the stitches are removed or the site is healed    How do I clean my wound?  Wash hands thoroughly with soap or use hand  before all wound care  Clean  the wound with gentle soap and water  Apply white petroleum/Vaseline  to wound after it is clean  Replace the Bandaid /bandage to keep the wound covered for the first few days or as instructed by your doctor  If you had a scalp biopsy, warm shower water to the area on a daily basis should suffice    What should I use to clean my wound?   Cotton-tipped applicators (Qtips )  White petroleum jelly (Vaseline ). Use a clean new container and use Q-tips to apply.  Bandaids  as needed  Gentle soap     How should I care for my wound long term?  Do not get your wound dirty  Keep up with wound care for one week or until the area is healed.  A small scab will form and fall off by itself when the area is completely healed. The area will be red and will become pink in color as it heals. Sun protection is very important for how your scar will turn out. Sunscreen with an SPF 30 or greater is recommended once the area is healed.  You should have some soreness but it should be mild and slowly go away over several days. Talk to your doctor about using tylenol for pain,    When should I call my doctor?  If you have increased:   Pain or swelling  Pus or drainage (clear or slightly yellow drainage is ok)  Temperature over 100F  Spreading redness or warmth around wound    When will I hear about my results?  The biopsy results can take 2 weeks to come back.  Your results will automatically release to idiag before your provider has even reviewed them.  The clinic will call you with the results, send you a idiag message, or have you schedule a follow-up clinic or phone time to discuss the results.  Contact our clinics if you do not hear from us in 2 weeks.    Who should I call with questions?  [facility]: [phone]  [facility]: [phone]  For urgent needs outside of business hours call the [facility] at [phone] and ask for the dermatology resident on call            Proper skin care from [facility]:    -Eliminate harsh soaps as they strip the natural oils from the skin, often resulting in dry itchy skin ( i.e. Dial, Zest, Armenian Spring)  -Use mild soaps such as Cetaphil or Dove Sensitive Skin in the shower. You do not need to use soap on arms, legs, and trunk every time you shower unless visibly soiled.   -Avoid hot or cold showers.  -After showering, lightly dry off and apply moisturizing within 2-3 minutes. This will help trap moisture in the skin.   -Aggressive use of a moisturizer at least 1-2 times a day to the entire body (including -Vanicream, Cetaphil, Aquaphor or Cerave) and moisturize hands after every washing.  -We recommend using moisturizers that come in a tub that needs to be scooped out, not a pump. This has more of an oil base. It will hold moisture in your skin much better than a water base moisturizer. The above recommended are non-pore clogging.      Wear a sunscreen with at least SPF 30 on your face, ears, neck and V of the chest daily. Wear sunscreen on other areas of the body if those areas are exposed to the sun throughout the day. Sunscreens can contain physical and/or chemical blockers. Physical blockers are less likely to clog pores, these include zinc oxide and titanium dioxide. Reapply every two hour and after swimming.     Sunscreen examples: https://www.ewg.org/sunscreen/    UV radiation  UVA radiation remains constant throughout the day and throughout the year. It is a longer wavelength than UVB and therefore penetrates deeper into the skin leading to immediate and delayed tanning, photoaging, and skin cancer. 70-80% of UVA and UVB radiation occurs between the hours of 10am-2pm.  UVB radiation  UVB radiation causes the most harmful effects and is more significant during the summer months. However, snow and ice can reflect UVB radiation leading to skin damage during the winter months as well. UVB radiation is responsible for  tanning, burning, inflammation, delayed erythema (pinkness), pigmentation (brown spots), and skin cancer.     I recommend self monthly full body exams and yearly full body exams with a dermatology provider. If you develop a new or changing lesion please follow up for examination. Most skin cancers are pink and scaly or pink and pearly. However, we do see blue/brown/black skin cancers.  Consider the ABCDEs of melanoma when giving yourself your monthly full body exam ( don't forget the groin, buttocks, feet, toes, etc). A-asymmetry, B-borders, C-color, D-diameter, E-elevation or evolving. If you see any of these changes please follow up in clinic. If you cannot see your back I recommend purchasing a hand held mirror to use with a larger wall mirror.       Checking for Skin Cancer  You can find cancer early by checking your skin each month. There are 3 kinds of skin cancer. They are melanoma, basal cell carcinoma, and squamous cell carcinoma. Doing monthly skin checks is the best way to find new marks or skin changes. Follow the instructions below for checking your skin.   The ABCDEs of checking moles for melanoma   Check your moles or growths for signs of melanoma using ABCDE:   Asymmetry: the sides of the mole or growth don t match  Border: the edges are ragged, notched, or blurred  Color: the color within the mole or growth varies  Diameter: the mole or growth is larger than 6 mm (size of a pencil eraser)  Evolving: the size, shape, or color of the mole or growth is changing (evolving is not shown in the images below)    Checking for other types of skin cancer  Basal cell carcinoma or squamous cell carcinoma have symptoms such as:     A spot or mole that looks different from all other marks on your skin  Changes in how an area feels, such as itching, tenderness, or pain  Changes in the skin's surface, such as oozing, bleeding, or scaliness  A sore that does not heal  New swelling or redness beyond the border of a  mole    Who s at risk?  Anyone can get skin cancer. But you are at greater risk if you have:   Fair skin, light-colored hair, or light-colored eyes  Many moles or abnormal moles on your skin  A history of sunburns from sunlight or tanning beds  A family history of skin cancer  A history of exposure to radiation or chemicals  A weakened immune system  If you have had skin cancer in the past, you are at risk for recurring skin cancer.   How to check your skin  Do your monthly skin checkups in front of a full-length mirror. Check all parts of your body, including your:   Head (ears, face, neck, and scalp)  Torso (front, back, and sides)  Arms (tops, undersides, upper, and lower armpits)  Hands (palms, backs, and fingers, including under the nails)  Buttocks and genitals  Legs (front, back, and sides)  Feet (tops, soles, toes, including under the nails, and between toes)  If you have a lot of moles, take digital photos of them each month. Make sure to take photos both up close and from a distance. These can help you see if any moles change over time.   Most skin changes are not cancer. But if you see any changes in your skin, call your doctor right away. Only he or she can diagnose a problem. If you have skin cancer, seeing your doctor can be the first step toward getting the treatment that could save your life.   [employer] last reviewed this educational content on 4/1/2019 2000-2020 [employer] [address] [place] [address], [place] [address]. All rights reserved. This information is not intended as a substitute for professional medical care. Always follow your healthcare professional's instructions.       When should I call my doctor?  If you are worsening or not improving, please, contact us or seek urgent care as noted below.     Who should I call with questions (adults)?    [facility] [phone]  For urgent needs outside of business hours call the [facility] at  [phone] and ask for the dermatology resident on call to be paged  If this is a medical emergency and you are unable to reach an ER, Call [phone]      If you need a prescription refill, please contact your pharmacy. Refills are approved or denied by our Physicians during normal business hours, Monday through Friday.  Per office policy, refills will not be granted if you have not been seen within the past year (or sooner depending on the condition).

## 2025-06-30 NOTE — PROGRESS NOTES
[facility] Dermatology Note  Encounter Date: Jun 30, 2025  Office Visit     Reviewed patients past medical history and pertinent chart review prior to patients visit today.     Dermatology Problem List:  0. NUB, right upper cheek, s/p shave biopsy Jun 30, 2025   0. NUB, right jawline, s/p shave biopsy Jun 30, 2025     1. Inflamed acrochordons, bilateral axillae, removed 6/30/2025    Personal Hx: no personal history of skin cancer  Family Hx: Brother, history of skin cancer    ____________________________________________    CC: Derm Problem (Spot on arms spot on facex3)    HPI:  Mr. Miguel Baca is a(n) 69 year old male who presents today as a new patient due to a concern of several lesions. He has numerous bothersome lesions involving the axillae. The patient notes brown raised lesions on his bilateral temples. He also has a larger raised spot on the right jawline that has been present for quite some time. It initially presented like a pimple. Lastly, the patient has a concern of spots on the forearms.    Patient is otherwise feeling well, without additional skin concerns.    Medications:  Current Outpatient Medications   Medication Sig Dispense Refill    apixaban ANTICOAGULANT (ELIQUIS ANTICOAGULANT) 5 MG tablet Take 1 tablet (5 mg) by mouth 2 times daily. 180 tablet 3    atenolol (TENORMIN) 25 MG tablet Take 1 tablet (25 mg) by mouth daily. 90 tablet 3    bumetanide (BUMEX) 1 MG tablet Take 1 tablet (1 mg) by mouth 2 times daily. 180 tablet 3    metFORMIN (GLUCOPHAGE XR) 500 MG 24 hr tablet Take 1 tablet (500 mg) by mouth daily (with dinner). 180 tablet 1    spironolactone (ALDACTONE) 25 MG tablet Take 2 tablets (50 mg) by mouth every morning. 180 tablet 3    tiZANidine (ZANAFLEX) 4 MG tablet Take 1 tablet (4 mg) by mouth nightly as needed for muscle spasms. 30 tablet 1     No current facility-administered medications for this visit.      Past Medical History:   Patient Active Problem List    Diagnosis    Essential Hypertension    Seborrheic Keratosis    Knee Sprain    Primary osteoarthritis of both knees    Bilateral pulmonary embolism (H)    Acute combined systolic and diastolic congestive heart failure (H)    Thrombocytopenia    Acute congestive heart failure, unspecified heart failure type (H)    Hypertensive heart disease with heart failure (H)     Past Medical History:   Diagnosis Date    Acute chest pain 12/22/2015    Acute combined systolic and diastolic congestive heart failure (H)     Cellulitis and abscess of unspecified site     Created by Conversion     Colon polyps     Cough     Created by Conversion     Hypertension     Morbid obesity (H)     Primary osteoarthritis of both knees     Pulmonary embolism (H)     Skin cancer     Thrombocytopenia     Urethral stricture        ____________________________________________     Physical Exam:  Vitals: There were no vitals taken for this visit.   SKIN: The exam included face, neck, bilateral axillae, and forearms.  - Peace II.  - Right upper cheek, 0.4 cm pink shiny thin papule  - Right jawline, 1.5 cm pink, shiny plaque with multiple telangiectasias  - Face, including bilateral temples, brown waxy stuck on appearing papules consistent with seborrheic keratoses  - Bilateral forearms, numerous tan to brown waxy and crusted stuck on appearing papules consistent with seborrheic keratoses  - Bilateral axillae, flesh colored pedunculated irritated papules consistent with inflamed acrochordons     - No other lesions of concern on areas examined.               _________________________________________    Assessment & Plan:   # Neoplasm of uncertain behavior:  right upper cheek.  DDx includes basal cell carcinoma. Shave biopsy today.  # Neoplasm of uncertain behavior:  right jawline.  DDx includes basal cell carcinoma. Shave biopsy today.  Procedure Note: Biopsy by shave technique  The risks and benefits of the procedure were described to the  patient. These include but are not limited to bleeding, infection, scar, incomplete removal, and non-diagnostic biopsy. Verbal informed consent was obtained. The above site(s) was cleansed with an alcohol pad and injected with 1% lidocaine with epinephrine. Once anesthesia was obtained, a biopsy(ies) was performed with DermaBlade. The tissue(s) was placed in a labeled container(s) with formalin and sent to pathology. Hemostasis was achieved with aluminum chloride, as well as electrodesiccation for the right jawline. Vaseline and a bandage were applied to the wound(s). The patient tolerated the procedure well and was given post biopsy care instructions.     Specimens sent to Dr. Cazares to read in house. Our team will be in contact with results.      # Seborrheic keratoses, face and bilateral forearms  - We discussed the benign nature of the skin lesions. No treatment required. Continued observation recommended. Follow up with any concerns or changes.      # Inflamed acrochordons, bilateral axillae x 25  The benign nature of the skin lesions was discussed with the patient.  Options were discussed including no treatment versus removal.  The patient requested removal of a total of 25 inflamed acrochordons due to irritation.    After being cleansed with an alcohol swab, the larger acrochordons were anaesthetized with lidocaine and epi and 25 acrochordons were removed with a sharp sterile scissors and forceps.  The patient tolerated the procedure well.  No complications.  Bandages applied where needed for hemostasis.     The patient will return in the next few weeks to month for a full skin check.  Additional inflamed acrochordons can be removed at that time, if he wishes.      All risks, benefits and alternatives were discussed with patient.  Patient is in agreement and understands the assessment and plan.  All questions were answered.    Lynette Sue PA-C  [facility]    CC Yahaira ALVARENGA  MD Rebecca  [address] [place] [phone] on close of this encounter.

## 2025-07-01 ENCOUNTER — DOCUMENTATION ONLY (OUTPATIENT)
Dept: OTHER | Facility: CLINIC | Age: 70
End: 2025-07-01

## 2025-07-01 ENCOUNTER — HOSPITAL ENCOUNTER (INPATIENT)
Facility: CLINIC | Age: 70
End: 2025-07-01
Attending: EMERGENCY MEDICINE | Admitting: STUDENT IN AN ORGANIZED HEALTH CARE EDUCATION/TRAINING PROGRAM
Payer: MEDICARE

## 2025-07-01 ENCOUNTER — APPOINTMENT (OUTPATIENT)
Dept: CT IMAGING | Facility: CLINIC | Age: 70
DRG: 397 | End: 2025-07-01
Attending: EMERGENCY MEDICINE
Payer: MEDICARE

## 2025-07-01 DIAGNOSIS — K35.32 ACUTE APPENDICITIS WITH PERFORATION AND LOCALIZED PERITONITIS, WITHOUT ABSCESS OR GANGRENE: ICD-10-CM

## 2025-07-01 DIAGNOSIS — R93.89 ABNORMAL CT SCAN: ICD-10-CM

## 2025-07-01 DIAGNOSIS — Z79.01 CHRONIC ANTICOAGULATION: ICD-10-CM

## 2025-07-01 DIAGNOSIS — J96.01 ACUTE RESPIRATORY FAILURE WITH HYPOXIA (H): Primary | ICD-10-CM

## 2025-07-01 LAB
ALBUMIN SERPL BCG-MCNC: 4.2 G/DL (ref 3.5–5.2)
ALBUMIN UR-MCNC: 30 MG/DL
ALP SERPL-CCNC: 104 U/L (ref 40–150)
ALT SERPL W P-5'-P-CCNC: 25 U/L (ref 0–70)
ANION GAP SERPL CALCULATED.3IONS-SCNC: 13 MMOL/L (ref 7–15)
APPEARANCE UR: CLEAR
AST SERPL W P-5'-P-CCNC: 27 U/L (ref 0–45)
BASOPHILS # BLD AUTO: 0 10E3/UL (ref 0–0.2)
BASOPHILS NFR BLD AUTO: 0 %
BILIRUB SERPL-MCNC: 1.3 MG/DL
BILIRUB UR QL STRIP: NEGATIVE
BUN SERPL-MCNC: 14.2 MG/DL (ref 8–23)
CALCIUM SERPL-MCNC: 9.3 MG/DL (ref 8.8–10.4)
CHLORIDE SERPL-SCNC: 93 MMOL/L (ref 98–107)
COLOR UR AUTO: YELLOW
CREAT SERPL-MCNC: 1.1 MG/DL (ref 0.67–1.17)
EGFRCR SERPLBLD CKD-EPI 2021: 72 ML/MIN/1.73M2
EOSINOPHIL # BLD AUTO: 0 10E3/UL (ref 0–0.7)
EOSINOPHIL NFR BLD AUTO: 0 %
ERYTHROCYTE [DISTWIDTH] IN BLOOD BY AUTOMATED COUNT: 15.9 % (ref 10–15)
ERYTHROCYTE [DISTWIDTH] IN BLOOD BY AUTOMATED COUNT: 16.1 % (ref 10–15)
GLUCOSE SERPL-MCNC: 130 MG/DL (ref 70–99)
GLUCOSE UR STRIP-MCNC: NEGATIVE MG/DL
HCO3 SERPL-SCNC: 28 MMOL/L (ref 22–29)
HCT VFR BLD AUTO: 52.2 % (ref 40–53)
HCT VFR BLD AUTO: 54.5 % (ref 40–53)
HGB BLD-MCNC: 17 G/DL (ref 13.3–17.7)
HGB BLD-MCNC: 17.6 G/DL (ref 13.3–17.7)
HGB UR QL STRIP: NEGATIVE
HOLD SPECIMEN: NORMAL
IMM GRANULOCYTES # BLD: 0.1 10E3/UL
IMM GRANULOCYTES NFR BLD: 1 %
KETONES UR STRIP-MCNC: ABNORMAL MG/DL
LEUKOCYTE ESTERASE UR QL STRIP: NEGATIVE
LYMPHOCYTES # BLD AUTO: 1.1 10E3/UL (ref 0.8–5.3)
LYMPHOCYTES NFR BLD AUTO: 8 %
MCH RBC QN AUTO: 31.1 PG (ref 26.5–33)
MCH RBC QN AUTO: 31.4 PG (ref 26.5–33)
MCHC RBC AUTO-ENTMCNC: 32.3 G/DL (ref 31.5–36.5)
MCHC RBC AUTO-ENTMCNC: 32.6 G/DL (ref 31.5–36.5)
MCV RBC AUTO: 96 FL (ref 78–100)
MCV RBC AUTO: 97 FL (ref 78–100)
MONOCYTES # BLD AUTO: 0.9 10E3/UL (ref 0–1.3)
MONOCYTES NFR BLD AUTO: 7 %
NEUTROPHILS # BLD AUTO: 11.4 10E3/UL (ref 1.6–8.3)
NEUTROPHILS NFR BLD AUTO: 84 %
NITRATE UR QL: NEGATIVE
NRBC # BLD AUTO: 0 10E3/UL
NRBC BLD AUTO-RTO: 0 /100
NT-PROBNP SERPL-MCNC: 80 PG/ML (ref 0–229)
PH UR STRIP: 8.5 [PH] (ref 5–7)
PLATELET # BLD AUTO: 125 10E3/UL (ref 150–450)
PLATELET # BLD AUTO: 140 10E3/UL (ref 150–450)
POTASSIUM SERPL-SCNC: 5.3 MMOL/L (ref 3.4–5.3)
PROCALCITONIN SERPL IA-MCNC: 0.17 NG/ML
PROT SERPL-MCNC: 8.1 G/DL (ref 6.4–8.3)
RADIOLOGIST FLAGS: ABNORMAL
RBC # BLD AUTO: 5.41 10E6/UL (ref 4.4–5.9)
RBC # BLD AUTO: 5.66 10E6/UL (ref 4.4–5.9)
RBC URINE: 1 /HPF
SODIUM SERPL-SCNC: 134 MMOL/L (ref 135–145)
SP GR UR STRIP: 1.02 (ref 1–1.03)
SQUAMOUS EPITHELIAL: 1 /HPF
UROBILINOGEN UR STRIP-MCNC: 2 MG/DL
WBC # BLD AUTO: 13.5 10E3/UL (ref 4–11)
WBC # BLD AUTO: 15.9 10E3/UL (ref 4–11)
WBC URINE: 2 /HPF

## 2025-07-01 PROCEDURE — 99285 EMERGENCY DEPT VISIT HI MDM: CPT | Performed by: EMERGENCY MEDICINE

## 2025-07-01 PROCEDURE — 250N000011 HC RX IP 250 OP 636: Performed by: EMERGENCY MEDICINE

## 2025-07-01 PROCEDURE — 96361 HYDRATE IV INFUSION ADD-ON: CPT | Performed by: EMERGENCY MEDICINE

## 2025-07-01 PROCEDURE — 36415 COLL VENOUS BLD VENIPUNCTURE: CPT | Performed by: FAMILY MEDICINE

## 2025-07-01 PROCEDURE — 258N000003 HC RX IP 258 OP 636: Performed by: EMERGENCY MEDICINE

## 2025-07-01 PROCEDURE — 120N000001 HC R&B MED SURG/OB

## 2025-07-01 PROCEDURE — 250N000013 HC RX MED GY IP 250 OP 250 PS 637: Performed by: FAMILY MEDICINE

## 2025-07-01 PROCEDURE — 81001 URINALYSIS AUTO W/SCOPE: CPT | Performed by: EMERGENCY MEDICINE

## 2025-07-01 PROCEDURE — 99223 1ST HOSP IP/OBS HIGH 75: CPT

## 2025-07-01 PROCEDURE — 250N000011 HC RX IP 250 OP 636

## 2025-07-01 PROCEDURE — 80053 COMPREHEN METABOLIC PANEL: CPT | Performed by: EMERGENCY MEDICINE

## 2025-07-01 PROCEDURE — 82310 ASSAY OF CALCIUM: CPT | Performed by: FAMILY MEDICINE

## 2025-07-01 PROCEDURE — 250N000013 HC RX MED GY IP 250 OP 250 PS 637: Performed by: EMERGENCY MEDICINE

## 2025-07-01 PROCEDURE — 96374 THER/PROPH/DIAG INJ IV PUSH: CPT | Mod: 59 | Performed by: EMERGENCY MEDICINE

## 2025-07-01 PROCEDURE — 250N000009 HC RX 250: Performed by: EMERGENCY MEDICINE

## 2025-07-01 PROCEDURE — 84145 PROCALCITONIN (PCT): CPT | Performed by: EMERGENCY MEDICINE

## 2025-07-01 PROCEDURE — 83880 ASSAY OF NATRIURETIC PEPTIDE: CPT

## 2025-07-01 PROCEDURE — 85004 AUTOMATED DIFF WBC COUNT: CPT | Performed by: FAMILY MEDICINE

## 2025-07-01 PROCEDURE — 258N000003 HC RX IP 258 OP 636

## 2025-07-01 PROCEDURE — 99285 EMERGENCY DEPT VISIT HI MDM: CPT | Mod: 25 | Performed by: EMERGENCY MEDICINE

## 2025-07-01 PROCEDURE — 85025 COMPLETE CBC W/AUTO DIFF WBC: CPT | Performed by: EMERGENCY MEDICINE

## 2025-07-01 PROCEDURE — 74177 CT ABD & PELVIS W/CONTRAST: CPT

## 2025-07-01 PROCEDURE — 81003 URINALYSIS AUTO W/O SCOPE: CPT | Performed by: EMERGENCY MEDICINE

## 2025-07-01 PROCEDURE — 85014 HEMATOCRIT: CPT

## 2025-07-01 PROCEDURE — 93005 ELECTROCARDIOGRAM TRACING: CPT

## 2025-07-01 PROCEDURE — 93005 ELECTROCARDIOGRAM TRACING: CPT | Performed by: EMERGENCY MEDICINE

## 2025-07-01 PROCEDURE — 93010 ELECTROCARDIOGRAM REPORT: CPT | Performed by: INTERNAL MEDICINE

## 2025-07-01 PROCEDURE — 96375 TX/PRO/DX INJ NEW DRUG ADDON: CPT | Performed by: EMERGENCY MEDICINE

## 2025-07-01 PROCEDURE — 36415 COLL VENOUS BLD VENIPUNCTURE: CPT

## 2025-07-01 RX ORDER — AMOXICILLIN 250 MG
2 CAPSULE ORAL 2 TIMES DAILY PRN
Status: ACTIVE | OUTPATIENT
Start: 2025-07-01

## 2025-07-01 RX ORDER — NALOXONE HYDROCHLORIDE 0.4 MG/ML
0.4 INJECTION, SOLUTION INTRAMUSCULAR; INTRAVENOUS; SUBCUTANEOUS
Status: ACTIVE | OUTPATIENT
Start: 2025-07-01

## 2025-07-01 RX ORDER — BUMETANIDE 1 MG/1
1 TABLET ORAL 2 TIMES DAILY
Status: DISPENSED | OUTPATIENT
Start: 2025-07-01

## 2025-07-01 RX ORDER — HYDROMORPHONE HCL IN WATER/PF 6 MG/30 ML
0.2 PATIENT CONTROLLED ANALGESIA SYRINGE INTRAVENOUS
Refills: 0 | Status: DISCONTINUED | OUTPATIENT
Start: 2025-07-01 | End: 2025-07-02

## 2025-07-01 RX ORDER — ONDANSETRON 4 MG/1
4 TABLET, ORALLY DISINTEGRATING ORAL EVERY 6 HOURS PRN
Status: DISCONTINUED | OUTPATIENT
Start: 2025-07-01 | End: 2025-07-03

## 2025-07-01 RX ORDER — SODIUM CHLORIDE, SODIUM LACTATE, POTASSIUM CHLORIDE, CALCIUM CHLORIDE 600; 310; 30; 20 MG/100ML; MG/100ML; MG/100ML; MG/100ML
1000 INJECTION, SOLUTION INTRAVENOUS CONTINUOUS
Status: DISCONTINUED | OUTPATIENT
Start: 2025-07-01 | End: 2025-07-01

## 2025-07-01 RX ORDER — ONDANSETRON 2 MG/ML
4 INJECTION INTRAMUSCULAR; INTRAVENOUS
Status: COMPLETED | OUTPATIENT
Start: 2025-07-01 | End: 2025-07-01

## 2025-07-01 RX ORDER — HYDROMORPHONE HCL IN WATER/PF 6 MG/30 ML
0.4 PATIENT CONTROLLED ANALGESIA SYRINGE INTRAVENOUS
Refills: 0 | Status: DISCONTINUED | OUTPATIENT
Start: 2025-07-01 | End: 2025-07-02

## 2025-07-01 RX ORDER — ONDANSETRON 2 MG/ML
4 INJECTION INTRAMUSCULAR; INTRAVENOUS EVERY 6 HOURS PRN
Status: DISCONTINUED | OUTPATIENT
Start: 2025-07-01 | End: 2025-07-03

## 2025-07-01 RX ORDER — CALCIUM CARBONATE 500 MG/1
1000 TABLET, CHEWABLE ORAL 4 TIMES DAILY PRN
Status: ACTIVE | OUTPATIENT
Start: 2025-07-01

## 2025-07-01 RX ORDER — NALOXONE HYDROCHLORIDE 0.4 MG/ML
0.2 INJECTION, SOLUTION INTRAMUSCULAR; INTRAVENOUS; SUBCUTANEOUS
Status: ACTIVE | OUTPATIENT
Start: 2025-07-01

## 2025-07-01 RX ORDER — ACETAMINOPHEN 650 MG/1
650 SUPPOSITORY RECTAL EVERY 4 HOURS PRN
Status: ACTIVE | OUTPATIENT
Start: 2025-07-01

## 2025-07-01 RX ORDER — FENTANYL CITRATE 50 UG/ML
25 INJECTION, SOLUTION INTRAMUSCULAR; INTRAVENOUS
Status: DISCONTINUED | OUTPATIENT
Start: 2025-07-01 | End: 2025-07-01

## 2025-07-01 RX ORDER — SODIUM CHLORIDE, SODIUM LACTATE, POTASSIUM CHLORIDE, CALCIUM CHLORIDE 600; 310; 30; 20 MG/100ML; MG/100ML; MG/100ML; MG/100ML
1000 INJECTION, SOLUTION INTRAVENOUS CONTINUOUS
Status: DISCONTINUED | OUTPATIENT
Start: 2025-07-01 | End: 2025-07-03

## 2025-07-01 RX ORDER — ACETAMINOPHEN 500 MG
1000 TABLET ORAL EVERY 8 HOURS PRN
Status: DISCONTINUED | OUTPATIENT
Start: 2025-07-01 | End: 2025-07-01

## 2025-07-01 RX ORDER — HEPARIN SODIUM 10000 [USP'U]/100ML
0-5000 INJECTION, SOLUTION INTRAVENOUS CONTINUOUS
Status: DISPENSED | OUTPATIENT
Start: 2025-07-01

## 2025-07-01 RX ORDER — ACETAMINOPHEN 325 MG/1
650 TABLET ORAL EVERY 4 HOURS PRN
Status: ACTIVE | OUTPATIENT
Start: 2025-07-01

## 2025-07-01 RX ORDER — ACETAMINOPHEN 325 MG/1
650 TABLET ORAL EVERY 4 HOURS PRN
Status: DISCONTINUED | OUTPATIENT
Start: 2025-07-01 | End: 2025-07-01

## 2025-07-01 RX ORDER — METRONIDAZOLE 500 MG/100ML
500 INJECTION, SOLUTION INTRAVENOUS EVERY 8 HOURS
Status: DISPENSED | OUTPATIENT
Start: 2025-07-01

## 2025-07-01 RX ORDER — OXYCODONE HYDROCHLORIDE 5 MG/1
5 TABLET ORAL EVERY 4 HOURS PRN
Refills: 0 | Status: DISCONTINUED | OUTPATIENT
Start: 2025-07-01 | End: 2025-07-02

## 2025-07-01 RX ORDER — LEVOFLOXACIN 5 MG/ML
750 INJECTION, SOLUTION INTRAVENOUS EVERY 24 HOURS
Status: DISPENSED | OUTPATIENT
Start: 2025-07-01

## 2025-07-01 RX ORDER — POLYETHYLENE GLYCOL 3350 17 G/17G
17 POWDER, FOR SOLUTION ORAL 2 TIMES DAILY PRN
Status: ACTIVE | OUTPATIENT
Start: 2025-07-01

## 2025-07-01 RX ORDER — ATENOLOL 25 MG/1
25 TABLET ORAL DAILY
Status: DISPENSED | OUTPATIENT
Start: 2025-07-02

## 2025-07-01 RX ORDER — AMOXICILLIN 250 MG
1 CAPSULE ORAL 2 TIMES DAILY PRN
Status: ACTIVE | OUTPATIENT
Start: 2025-07-01

## 2025-07-01 RX ORDER — LIDOCAINE 40 MG/G
CREAM TOPICAL
Status: DISCONTINUED | OUTPATIENT
Start: 2025-07-01 | End: 2025-07-02

## 2025-07-01 RX ORDER — IOPAMIDOL 755 MG/ML
149 INJECTION, SOLUTION INTRAVASCULAR ONCE
Status: COMPLETED | OUTPATIENT
Start: 2025-07-01 | End: 2025-07-01

## 2025-07-01 RX ORDER — CALCIUM CARBONATE 500 MG/1
1000 TABLET, CHEWABLE ORAL 4 TIMES DAILY PRN
Status: DISCONTINUED | OUTPATIENT
Start: 2025-07-01 | End: 2025-07-01

## 2025-07-01 RX ORDER — METFORMIN HYDROCHLORIDE 500 MG/1
500 TABLET, EXTENDED RELEASE ORAL
Status: ACTIVE | OUTPATIENT
Start: 2025-07-01

## 2025-07-01 RX ORDER — BISACODYL 10 MG
10 SUPPOSITORY, RECTAL RECTAL DAILY PRN
Status: ACTIVE | OUTPATIENT
Start: 2025-07-01

## 2025-07-01 RX ORDER — SPIRONOLACTONE 50 MG/1
50 TABLET, FILM COATED ORAL EVERY MORNING
Status: ACTIVE | OUTPATIENT
Start: 2025-07-02

## 2025-07-01 RX ORDER — ACETAMINOPHEN 500 MG
1000 TABLET ORAL ONCE
Status: COMPLETED | OUTPATIENT
Start: 2025-07-01 | End: 2025-07-01

## 2025-07-01 RX ADMIN — SODIUM CHLORIDE 77 ML: 9 INJECTION, SOLUTION INTRAVENOUS at 18:03

## 2025-07-01 RX ADMIN — HEPARIN SODIUM 1200 UNITS/HR: 10000 INJECTION, SOLUTION INTRAVENOUS at 23:21

## 2025-07-01 RX ADMIN — SODIUM CHLORIDE, SODIUM LACTATE, POTASSIUM CHLORIDE, AND CALCIUM CHLORIDE 500 ML: .6; .31; .03; .02 INJECTION, SOLUTION INTRAVENOUS at 18:17

## 2025-07-01 RX ADMIN — ACETAMINOPHEN 1000 MG: 500 TABLET, FILM COATED ORAL at 17:46

## 2025-07-01 RX ADMIN — ONDANSETRON 4 MG: 2 INJECTION, SOLUTION INTRAMUSCULAR; INTRAVENOUS at 17:45

## 2025-07-01 RX ADMIN — FENTANYL CITRATE 25 MCG: 50 INJECTION INTRAMUSCULAR; INTRAVENOUS at 17:44

## 2025-07-01 RX ADMIN — SODIUM CHLORIDE, POTASSIUM CHLORIDE, SODIUM LACTATE AND CALCIUM CHLORIDE 1000 ML: 600; 310; 30; 20 INJECTION, SOLUTION INTRAVENOUS at 23:08

## 2025-07-01 RX ADMIN — LEVOFLOXACIN 750 MG: 5 INJECTION, SOLUTION INTRAVENOUS at 20:52

## 2025-07-01 RX ADMIN — ACETAMINOPHEN 1000 MG: 500 TABLET, FILM COATED ORAL at 15:41

## 2025-07-01 RX ADMIN — METRONIDAZOLE 500 MG: 500 INJECTION, SOLUTION INTRAVENOUS at 19:42

## 2025-07-01 RX ADMIN — IOPAMIDOL 149 ML: 755 INJECTION, SOLUTION INTRAVENOUS at 18:03

## 2025-07-01 ASSESSMENT — ENCOUNTER SYMPTOMS
CONSTITUTIONAL NEGATIVE: 1
ENDOCRINE NEGATIVE: 1
PSYCHIATRIC NEGATIVE: 1
HEMATOLOGIC/LYMPHATIC NEGATIVE: 1
ALLERGIC/IMMUNOLOGIC NEGATIVE: 1
CARDIOVASCULAR NEGATIVE: 1
NEUROLOGICAL NEGATIVE: 1
MUSCULOSKELETAL NEGATIVE: 1
EYES NEGATIVE: 1
ABDOMINAL PAIN: 1
RESPIRATORY NEGATIVE: 1

## 2025-07-01 ASSESSMENT — ACTIVITIES OF DAILY LIVING (ADL)
ADLS_ACUITY_SCORE: 41
ADLS_ACUITY_SCORE: 41
WEAR_GLASSES_OR_BLIND: YES
CHANGE_IN_FUNCTIONAL_STATUS_SINCE_ONSET_OF_CURRENT_ILLNESS/INJURY: NO
DIFFICULTY_EATING/SWALLOWING: NO
ADLS_ACUITY_SCORE: 41
CONCENTRATING,_REMEMBERING_OR_MAKING_DECISIONS_DIFFICULTY: NO
DIFFICULTY_COMMUNICATING: NO
HEARING_DIFFICULTY_OR_DEAF: NO
DOING_ERRANDS_INDEPENDENTLY_DIFFICULTY: NO
VISION_MANAGEMENT: GLASSES
ADLS_ACUITY_SCORE: 41
FALL_HISTORY_WITHIN_LAST_SIX_MONTHS: NO
DRESSING/BATHING_DIFFICULTY: NO
ADLS_ACUITY_SCORE: 23
ADLS_ACUITY_SCORE: 23
WALKING_OR_CLIMBING_STAIRS_DIFFICULTY: NO
TOILETING_ISSUES: NO

## 2025-07-01 ASSESSMENT — COLUMBIA-SUICIDE SEVERITY RATING SCALE - C-SSRS
2. HAVE YOU ACTUALLY HAD ANY THOUGHTS OF KILLING YOURSELF IN THE PAST MONTH?: NO
6. HAVE YOU EVER DONE ANYTHING, STARTED TO DO ANYTHING, OR PREPARED TO DO ANYTHING TO END YOUR LIFE?: NO
1. IN THE PAST MONTH, HAVE YOU WISHED YOU WERE DEAD OR WISHED YOU COULD GO TO SLEEP AND NOT WAKE UP?: NO

## 2025-07-01 NOTE — TELEPHONE ENCOUNTER
"Patient informed of results. He said he will wait to schedule Mohs after he addresses his gallstones which are \"acting up\" at this moment.  Will postpone and follow up in one week.    Shea Desai RN    "

## 2025-07-01 NOTE — ED PROVIDER NOTES
History     Chief Complaint   Patient presents with    Flank Pain     HPI  Miguel Baca is a 69 year old male who arrived by car with his adopted son Elias from home reporting acute right lower quadrant abdominal pain after awakening this morning patient initially felt that his symptoms were related to repositioning in bed although he reported no trauma.  Over the course of the day his pain has progressively worsened.  He confirmed that he is concerned to be compliant with his apixaban for history of pulmonary embolism.  No prior abdominal surgeries.  He reported passing flatus with recent bowel movements in the last 2 to 3 days no urinary symptoms and no flank pain no back pain.  Given progressive right lower quadrant abdominal discomfort he arrived by car for further assessment and care.    Allergies:  Allergies   Allergen Reactions    Ceftriaxone Anaphylaxis       Problem List:    Patient Active Problem List    Diagnosis Date Noted    Abnormal CT scan 07/01/2025     Priority: Medium    Acute appendicitis with perforation and localized peritonitis, without abscess or gangrene 07/01/2025     Priority: Medium    Acute congestive heart failure, unspecified heart failure type (H) 06/27/2025     Priority: Medium    Hypertensive heart disease with heart failure (H) 06/27/2025     Priority: Medium    Bilateral pulmonary embolism (H) 01/29/2024     Priority: Medium    Acute combined systolic and diastolic congestive heart failure (H) 01/29/2024     Priority: Medium    Thrombocytopenia 01/29/2024     Priority: Medium    Primary osteoarthritis of both knees 02/19/2019     Priority: Medium    Essential Hypertension      Priority: Medium     Created by Conversion  Replacement Utility updated for latest IMO load        Seborrheic Keratosis      Priority: Medium     Created by Conversion        Knee Sprain      Priority: Medium     Created by Conversion            Past Medical History:    Past Medical History:   Diagnosis  Date    Acute chest pain 12/22/2015    Acute combined systolic and diastolic congestive heart failure (H)     Cellulitis and abscess of unspecified site     Colon polyps     Cough     Hypertension     Morbid obesity (H)     Primary osteoarthritis of both knees     Pulmonary embolism (H)     Skin cancer     Thrombocytopenia     Urethral stricture        Past Surgical History:    Past Surgical History:   Procedure Laterality Date    ARTHROSCOPY KNEE Left 2012    CYSTOSCOPY, DILATE URETHRA, COMBINED N/A 9/10/2024    Procedure: CYSTOSCOPY, URETHRAL DILATION;  Surgeon: Felix Lloyd MD;  Location: [facility] [place]    RETROGRADE URETHROGRAM N/A 9/10/2024    Procedure: RETROGRADE URETHROGRAM, OPTILUME BALLOON URETHRAL DILATION;  Surgeon: Felix Lloyd MD;  [facility]       Family History:    Family History   Problem Relation Age of Onset    Coronary Artery Disease Father         s/p stents, CABG    Coronary Artery Disease Brother         s/p stents    Cancer Mother     Parkinsonism Paternal Grandmother     Diabetes Type 2  Paternal Grandmother        Social History:  Marital Status:  Single [1]  Social History     Tobacco Use    Smoking status: Former     Types: Cigarettes    Smokeless tobacco: Never   Vaping Use    Vaping status: Never Used        Medications:    apixaban ANTICOAGULANT (ELIQUIS ANTICOAGULANT) 5 MG tablet  atenolol (TENORMIN) 25 MG tablet  bumetanide (BUMEX) 1 MG tablet  metFORMIN (GLUCOPHAGE XR) 500 MG 24 hr tablet  spironolactone (ALDACTONE) 25 MG tablet  tiZANidine (ZANAFLEX) 4 MG tablet          Review of Systems   Constitutional: Negative.    HENT: Negative.     Eyes: Negative.    Respiratory: Negative.     Cardiovascular: Negative.    Gastrointestinal:  Positive for abdominal pain.   Endocrine: Negative.    Genitourinary: Negative.    Musculoskeletal: Negative.    Skin: Negative.    Allergic/Immunologic: Negative.    Neurological: Negative.    Hematological: Negative.     Psychiatric/Behavioral: Negative.     All other systems reviewed and are negative.      Physical Exam   BP: (!) 170/110  Pulse: 93  Temp: 100.9  F (38.3  C)  Resp: 20  Weight: (!) 155.6 kg (343 lb)  SpO2: (!) 91 %      Physical Exam  Constitutional:       Appearance: He is ill-appearing.   HENT:      Head: Normocephalic and atraumatic.      Nose: Nose normal.      Mouth/Throat:      Mouth: Mucous membranes are moist.   Eyes:      Extraocular Movements: Extraocular movements intact.      Pupils: Pupils are equal, round, and reactive to light.   Cardiovascular:      Rate and Rhythm: Normal rate.   Pulmonary:      Effort: Pulmonary effort is normal.      Breath sounds: Normal breath sounds.   Abdominal:      Tenderness: There is abdominal tenderness.       Skin:     Capillary Refill: Capillary refill takes less than 2 seconds.      Coloration: Skin is not jaundiced or pale.      Findings: No bruising, erythema, lesion or rash.   Neurological:      General: No focal deficit present.      Mental Status: He is alert and oriented to person, place, and time.      Cranial Nerves: No cranial nerve deficit.      Sensory: No sensory deficit.      Motor: No weakness.      Coordination: Coordination normal.      Gait: Gait normal.      Deep Tendon Reflexes: Reflexes normal.   Psychiatric:         Mood and Affect: Mood normal.         Behavior: Behavior normal.         Thought Content: Thought content normal.         Judgment: Judgment normal.         ED Course        Procedures              Critical Care time:  none     IV Antibiotics given and/or elevated Lactate of 0 and no sepsis note found - Delete this reminder and enter the sepsis note or '.edcms' before signing chart.>>>None         ED medications:  Medications   lactated ringers infusion 1,000 mL ( Intravenous Canceled Entry 7/1/25 1941)   acetaminophen (TYLENOL) tablet 1,000 mg (1,000 mg Oral $Given 7/1/25 6473)   fentaNYL (PF) (SUBLIMAZE) injection 25 mcg (25 mcg  "Intravenous $Given 7/1/25 1744)   levofloxacin (LEVAQUIN) infusion 750 mg (has no administration in time range)   metroNIDAZOLE (FLAGYL) infusion 500 mg (500 mg Intravenous $New Bag 7/1/25 1942)   acetaminophen (TYLENOL) tablet 1,000 mg (1,000 mg Oral $Given 7/1/25 1541)   lactated ringers BOLUS 500 mL (0 mLs Intravenous Stopped 7/1/25 1941)   ondansetron (ZOFRAN) injection 4 mg (4 mg Intravenous $Given 7/1/25 1745)   iopamidol (ISOVUE-370) solution 149 mL (149 mLs Intravenous $Given 7/1/25 1803)   sodium chloride 0.9 % bag for CT scan flush (77 mLs As instructed $Given 7/1/25 1803)        ED Vitals:  Vitals:    07/01/25 1533 07/01/25 1750 07/01/25 1820 07/01/25 1830   BP:   104/64 118/65   Pulse:    88   Resp:       Temp:       TempSrc:       SpO2: (!) 91% (!) 84% 93% 95%   Weight:          Vitals:    07/01/25 1930 07/01/25 2000 07/01/25 2030 07/01/25 2121   BP: 119/70 116/62 119/69 (!) 140/75   BP Location:    Left arm   Pulse: 90 82 83    Resp:    20   Temp:    98  F (36.7  C)   TempSrc:    Oral   SpO2: 96% 96% 95% 93%   Weight:    (!) 154.8 kg (341 lb 4.4 oz)   Height:    1.753 m (5' 9\")      ED labs and imaging:  Recent Results (from the past 24 hours)   CBC with platelets, differential    Narrative    The following orders were created for panel order CBC with platelets, differential.  Procedure                               Abnormality         Status                     ---------                               -----------         ------                     CBC with platelets and ...[[id number]]  Abnormal            Final result                 Please view results for these tests on the individual orders.   Comprehensive metabolic panel   Result Value Ref Range    Sodium 134 (L) 135 - 145 mmol/L    Potassium 5.3 3.4 - 5.3 mmol/L    Carbon Dioxide (CO2) 28 22 - 29 mmol/L    Anion Gap 13 7 - 15 mmol/L    Urea Nitrogen 14.2 8.0 - 23.0 mg/dL    Creatinine 1.10 0.67 - 1.17 mg/dL    GFR Estimate 72 >60 mL/min/1.73m2 "    Calcium 9.3 8.8 - 10.4 mg/dL    Chloride 93 (L) 98 - 107 mmol/L    Glucose 130 (H) 70 - 99 mg/dL    Alkaline Phosphatase 104 40 - 150 U/L    AST 27 0 - 45 U/L    ALT 25 0 - 70 U/L    Protein Total 8.1 6.4 - 8.3 g/dL    Albumin 4.2 3.5 - 5.2 g/dL    Bilirubin Total 1.3 (H) <=1.2 mg/dL   [facility] Draw    Narrative    The following orders were created for panel order [place] Draw.  Procedure                               Abnormality         Status                     ---------                               -----------         ------                     Extra Blue Top Tube[[id number]]                             Final result                 Please view results for these tests on the individual orders.   CBC with platelets and differential   Result Value Ref Range    WBC Count 13.5 (H) 4.0 - 11.0 10e3/uL    RBC Count 5.66 4.40 - 5.90 10e6/uL    Hemoglobin 17.6 13.3 - 17.7 g/dL    Hematocrit 54.5 (H) 40.0 - 53.0 %    MCV 96 78 - 100 fL    MCH 31.1 26.5 - 33.0 pg    MCHC 32.3 31.5 - 36.5 g/dL    RDW 16.1 (H) 10.0 - 15.0 %    Platelet Count 140 (L) 150 - 450 10e3/uL    % Neutrophils 84 %    % Lymphocytes 8 %    % Monocytes 7 %    % Eosinophils 0 %    % Basophils 0 %    % Immature Granulocytes 1 %    NRBCs per 100 WBC 0 <1 /100    Absolute Neutrophils 11.4 (H) 1.6 - 8.3 10e3/uL    Absolute Lymphocytes 1.1 0.8 - 5.3 10e3/uL    Absolute Monocytes 0.9 0.0 - 1.3 10e3/uL    Absolute Eosinophils 0.0 0.0 - 0.7 10e3/uL    Absolute Basophils 0.0 0.0 - 0.2 10e3/uL    Absolute Immature Granulocytes 0.1 <=0.4 10e3/uL    Absolute NRBCs 0.0 10e3/uL   Extra Blue Top Tube   Result Value Ref Range    Hold Specimen JIC    Procalcitonin   Result Value Ref Range    Procalcitonin 0.17 <0.50 ng/mL   UA Macroscopic with reflex to Microscopic and Culture    Specimen: Urine, Clean Catch   Result Value Ref Range    Color Urine Yellow Colorless, Straw, Light Yellow, Yellow    Appearance Urine Clear Clear    Glucose Urine Negative Negative mg/dL     Bilirubin Urine Negative Negative    Ketones Urine Trace (A) Negative mg/dL    Specific Gravity Urine 1.023 1.003 - 1.035    Blood Urine Negative Negative    pH Urine 8.5 (H) 5.0 - 7.0    Protein Albumin Urine 30 (A) Negative mg/dL    Urobilinogen Urine 2.0 (A) Normal mg/dL    Nitrite Urine Negative Negative    Leukocyte Esterase Urine Negative Negative    RBC Urine 1 <=2 /HPF    WBC Urine 2 <=5 /HPF    Squamous Epithelials Urine 1 <=1 /HPF    Narrative    Urine Culture not indicated   CT Abdomen Pelvis w Contrast   Result Value Ref Range    Radiologist flags Perforated viscus (AA)     Narrative    EXAM: CT ABDOMEN PELVIS W CONTRAST  LOCATION: [facility]  DATE: 7/1/2025    INDICATION: Acute right lower quadrant abdominal pain.  Anticoagulated   Hx of VTE (Bilateral PE). Large BMI.  Eval for acute diverticulitis and appendicitis or obstructing urinary stone vs other acute catastrophe  COMPARISON: 11/3/2023  TECHNIQUE: CT scan of the abdomen and pelvis was performed following injection of IV contrast. Multiplanar reformats were obtained. Dose reduction techniques were used.  CONTRAST: 149 ml Isovue 370    FINDINGS:   LOWER CHEST: Normal.    HEPATOBILIARY: Diffuse hepatic steatosis. No significant mass. No bile duct dilatation. No calcified gallstones.    PANCREAS: Normal.    SPLEEN: Normal.    ADRENAL GLANDS: Normal.    KIDNEYS/BLADDER: No significant mass, stone, or hydronephrosis. Bladder is underdistended with circumferential wall thickening.    BOWEL: Dilated appendix up to 1.2 cm with wall thickening and periappendiceal fat stranding. There are a few foci of extraluminal gas adjacent to the distal appendix (e.g. 3/56). No calcified appendicolith or fluid collection.    LYMPH NODES: Normal.    VASCULATURE: No abdominal aortic aneurysm.    PELVIC ORGANS: Normal.    MUSCULOSKELETAL: Tiny fat-containing paraumbilical hernia. Bilateral gynecomastia.      Impression    IMPRESSION:   1.   Acute appendicitis with a few foci of extraluminal gas along the distal appendix consistent with localized perforation. No fluid collection.  2.  Diffuse hepatic steatosis.    [Critical Result: Perforated viscus]    Finding was identified on 7/1/2025 6:54 PM CDT.     Corey Villagran was contacted by me on 7/1/2025 7:00 PM CDT and verbalized understanding of the critical result.       Assessments & Plan (with Medical Decision Making)   Assessment Summary and clinical Impression 69-year-old male who present with acute right-sided pain and fever.  Patient reported waking up with progressive discomfort and reporting no urinary symptoms on arrival with no prior abdominal surgeries.  Patient has a previous diagnosis of thrombocytopenia and a history of hypertensive heart failure with prior diagnosis of bilateral pulm embolism is currently on anticoagulation with apixaban.   On arrival patient was To be febrile temperature 100.9, blood pressure was 170/110.  He was 91% on room air.  Given his active anticoagulation with acute atraumatic right-sided pain advanced imaging of chest abdomen pelvis was obtained to assess for acute cardiopulmonary intra-abdominal process.  He reported no testicular pain and no groin pain.  His workup and imaging revealed concern for acute appendicitis with a few foci of extraluminal gas along the distal appendix consistent with localized perforation.      After consultation with surgery on-call plan is for admission to medicine and surgical follow-up for further definitive management and care.    ED course and plan:  Reviewed the medical record.  Reviewed visit on 6/30/25.  Imaging from 11/6/2023.  See details outlined in medical record.  Given acute febrile illness with atraumatic right-sided pain on active anticoagulation and [facility] advanced imaging chest abdomen pelvis was obtained to assess for emergent processes that  Patient's symptoms including his febrile illness.   His workup on arrival  revealed a white count of 13.5.  Patient does have a history of chronic thrombocytopenia.  Baseline platelets 1 from 112-155.  Platelets 140.  Sodium 134. Urinalysis without evidence of infection or hematuria. Normal pro-calcitonin.    Spoke with Dr. CT Villarreal- reading radiologist at 7pm who reported concern for early localized perforation initial no focus of gas around an area of acute appendicitis there was no accompanying abscess or fecalith.  See additional details outlined in radiology.    Given CT interpretation in discussion with radiology consulted with surgery on-call. Spoke with Dr Pineda at 7.10pm We reviewed presentation, ED course of care and imaging findings.  Patient started on empiric anti-infectives with sepsis dosing given concern for contained perforation with support from surgery after review of allergy profile with [employer]. See surgery consult note for further details.    Patient started empirically on intravenous levofloxacin and metronidazole based on patient's medication allergy profile.  Plan for further definitive intervention in the morning.    Spoke with admitting provider- DAYTON Shanks PA-C at 8.12pm who accepted patient for further inpatient care.  Reviewed presentation, workup. imaging findings, anti-infectives selected and consultation with surgery and their plan of care.  Imaging findings and anticipated plan of care was reviewed with patient and son at the bedside.      Disclaimer: This note consists of symbols derived from keyboarding, dictation and/or voice recognition software. As a result, there may be errors in the script that have gone undetected. Please consider this when interpreting information found in this chart.   I have reviewed the nursing notes.    I have reviewed the findings, diagnosis, plan and need for follow up with the patient.           Medical Decision Making  The patient's presentation was of high complexity (advanced age, active anticoagulation for history of  pulmonary embolism, atraumatic right-sided pain with acute febrile illness).    The patient's evaluation involved:  ordering and/or review of 3+ test(s) in this encounter (intravenous medications manage discomfort, antipyretics, blood work, cultures, advanced imaging chest abdomen pelvis with contrast)    The patient's management necessitated high risk (admitted for medical management for concern for contained perforated appendicitis on empiric anti-infectives with surgery to follow for further definitive intervention and care).        New Prescriptions    No medications on file       Final diagnoses:   Acute appendicitis with perforation and localized peritonitis, without abscess or gangrene   Abnormal CT scan - IMPRESSION:  1.  Acute appendicitis with a few foci of extraluminal gas along the distal appendix consistent with localized perforation. No fluid collection. 2.  Diffuse hepatic steatosis.   Chronic anticoagulation - On apixaban for history of pulmonary embolism       7/1/2025   [facility] EMERGENCY DEPT       Corey Villagran MD  07/02/25 0018

## 2025-07-01 NOTE — TELEPHONE ENCOUNTER
Patient Contact    Attempt # 1    Was call answered?  No  Left message on answering machine for patient to call back.        [facility]   [phone]

## 2025-07-01 NOTE — ED TRIAGE NOTES
Right side pain, and temp 100.9 started this morning when woke up and has been worsening throughout the day. Pt hasn't eaten anything today, denies n/v. 10/10 pain denies urinary symptoms. Pt has all his abdominal organs

## 2025-07-02 ENCOUNTER — PATIENT OUTREACH (OUTPATIENT)
Dept: CARE COORDINATION | Facility: CLINIC | Age: 70
End: 2025-07-02

## 2025-07-02 ENCOUNTER — APPOINTMENT (OUTPATIENT)
Dept: CARDIOLOGY | Facility: CLINIC | Age: 70
End: 2025-07-02
Payer: MEDICARE

## 2025-07-02 ENCOUNTER — ANESTHESIA EVENT (OUTPATIENT)
Dept: SURGERY | Facility: CLINIC | Age: 70
End: 2025-07-02
Payer: MEDICARE

## 2025-07-02 ENCOUNTER — ANESTHESIA (OUTPATIENT)
Dept: SURGERY | Facility: CLINIC | Age: 70
End: 2025-07-02
Payer: MEDICARE

## 2025-07-02 LAB
ABO + RH BLD: NORMAL
ALBUMIN SERPL BCG-MCNC: 3.5 G/DL (ref 3.5–5.2)
ALP SERPL-CCNC: 94 U/L (ref 40–150)
ALT SERPL W P-5'-P-CCNC: 18 U/L (ref 0–70)
ANION GAP SERPL CALCULATED.3IONS-SCNC: 11 MMOL/L (ref 7–15)
APTT PPP: 33 SECONDS (ref 22–38)
APTT PPP: 37 SECONDS (ref 22–38)
AST SERPL W P-5'-P-CCNC: 28 U/L (ref 0–45)
BASE EXCESS BLDV CALC-SCNC: 1.6 MMOL/L (ref -3–3)
BASOPHILS # BLD AUTO: 0 10E3/UL (ref 0–0.2)
BASOPHILS NFR BLD AUTO: 0 %
BILIRUB SERPL-MCNC: 1.3 MG/DL
BLD GP AB SCN SERPL QL: NEGATIVE
BUN SERPL-MCNC: 11.9 MG/DL (ref 8–23)
CALCIUM SERPL-MCNC: 8.5 MG/DL (ref 8.8–10.4)
CHLORIDE SERPL-SCNC: 95 MMOL/L (ref 98–107)
CREAT SERPL-MCNC: 1.06 MG/DL (ref 0.67–1.17)
CRP SERPL-MCNC: 203.72 MG/L
EGFRCR SERPLBLD CKD-EPI 2021: 75 ML/MIN/1.73M2
EOSINOPHIL # BLD AUTO: 0 10E3/UL (ref 0–0.7)
EOSINOPHIL NFR BLD AUTO: 0 %
ERYTHROCYTE [DISTWIDTH] IN BLOOD BY AUTOMATED COUNT: 16.1 % (ref 10–15)
GLUCOSE SERPL-MCNC: 154 MG/DL (ref 70–99)
HCO3 BLDV-SCNC: 31 MMOL/L (ref 21–28)
HCO3 SERPL-SCNC: 26 MMOL/L (ref 22–29)
HCT VFR BLD AUTO: 50.6 % (ref 40–53)
HGB BLD-MCNC: 16.1 G/DL (ref 13.3–17.7)
IMM GRANULOCYTES # BLD: 0.1 10E3/UL
IMM GRANULOCYTES NFR BLD: 1 %
LVEF ECHO: NORMAL
LYMPHOCYTES # BLD AUTO: 1.1 10E3/UL (ref 0.8–5.3)
LYMPHOCYTES NFR BLD AUTO: 8 %
MCH RBC QN AUTO: 31.3 PG (ref 26.5–33)
MCHC RBC AUTO-ENTMCNC: 31.8 G/DL (ref 31.5–36.5)
MCV RBC AUTO: 98 FL (ref 78–100)
MONOCYTES # BLD AUTO: 1 10E3/UL (ref 0–1.3)
MONOCYTES NFR BLD AUTO: 7 %
NEUTROPHILS # BLD AUTO: 11.2 10E3/UL (ref 1.6–8.3)
NEUTROPHILS NFR BLD AUTO: 84 %
NRBC # BLD AUTO: 0 10E3/UL
NRBC BLD AUTO-RTO: 0 /100
O2/TOTAL GAS SETTING VFR VENT: 50 %
OXYHGB MFR BLDV: 81 % (ref 70–75)
PCO2 BLDV: 69 MM HG (ref 40–50)
PH BLDV: 7.27 [PH] (ref 7.32–7.43)
PLATELET # BLD AUTO: 121 10E3/UL (ref 150–450)
PO2 BLDV: 53 MM HG (ref 25–47)
POTASSIUM SERPL-SCNC: 5 MMOL/L (ref 3.4–5.3)
PROT SERPL-MCNC: 6.6 G/DL (ref 6.4–8.3)
RBC # BLD AUTO: 5.14 10E6/UL (ref 4.4–5.9)
SAO2 % BLDV: 83.5 % (ref 70–75)
SODIUM SERPL-SCNC: 132 MMOL/L (ref 135–145)
SPECIMEN EXP DATE BLD: NORMAL
WBC # BLD AUTO: 13.4 10E3/UL (ref 4–11)

## 2025-07-02 PROCEDURE — 93306 TTE W/DOPPLER COMPLETE: CPT | Mod: 26 | Performed by: INTERNAL MEDICINE

## 2025-07-02 PROCEDURE — 36415 COLL VENOUS BLD VENIPUNCTURE: CPT | Performed by: STUDENT IN AN ORGANIZED HEALTH CARE EDUCATION/TRAINING PROGRAM

## 2025-07-02 PROCEDURE — 360N000076 HC SURGERY LEVEL 3, PER MIN: Performed by: SURGERY

## 2025-07-02 PROCEDURE — 255N000002 HC RX 255 OP 636

## 2025-07-02 PROCEDURE — 258N000003 HC RX IP 258 OP 636

## 2025-07-02 PROCEDURE — 250N000013 HC RX MED GY IP 250 OP 250 PS 637

## 2025-07-02 PROCEDURE — 250N000009 HC RX 250: Performed by: NURSE ANESTHETIST, CERTIFIED REGISTERED

## 2025-07-02 PROCEDURE — 82805 BLOOD GASES W/O2 SATURATION: CPT | Performed by: NURSE ANESTHETIST, CERTIFIED REGISTERED

## 2025-07-02 PROCEDURE — 5A09357 ASSISTANCE WITH RESPIRATORY VENTILATION, LESS THAN 24 CONSECUTIVE HOURS, CONTINUOUS POSITIVE AIRWAY PRESSURE: ICD-10-PCS | Performed by: INTERNAL MEDICINE

## 2025-07-02 PROCEDURE — 99232 SBSQ HOSP IP/OBS MODERATE 35: CPT | Performed by: STUDENT IN AN ORGANIZED HEALTH CARE EDUCATION/TRAINING PROGRAM

## 2025-07-02 PROCEDURE — 94660 CPAP INITIATION&MGMT: CPT

## 2025-07-02 PROCEDURE — 0DTJ4ZZ RESECTION OF APPENDIX, PERCUTANEOUS ENDOSCOPIC APPROACH: ICD-10-PCS | Performed by: SURGERY

## 2025-07-02 PROCEDURE — 271N000001 HC OR GENERAL SUPPLY NON-STERILE: Performed by: SURGERY

## 2025-07-02 PROCEDURE — 85025 COMPLETE CBC W/AUTO DIFF WBC: CPT

## 2025-07-02 PROCEDURE — 120N000013 HC R&B IMCU

## 2025-07-02 PROCEDURE — 250N000011 HC RX IP 250 OP 636: Performed by: EMERGENCY MEDICINE

## 2025-07-02 PROCEDURE — 36415 COLL VENOUS BLD VENIPUNCTURE: CPT | Performed by: NURSE ANESTHETIST, CERTIFIED REGISTERED

## 2025-07-02 PROCEDURE — 36415 COLL VENOUS BLD VENIPUNCTURE: CPT

## 2025-07-02 PROCEDURE — 999N000157 HC STATISTIC RCP TIME EA 10 MIN

## 2025-07-02 PROCEDURE — 710N000010 HC RECOVERY PHASE 1, LEVEL 2, PER MIN: Performed by: SURGERY

## 2025-07-02 PROCEDURE — 99222 1ST HOSP IP/OBS MODERATE 55: CPT | Mod: FS | Performed by: SURGERY

## 2025-07-02 PROCEDURE — 250N000009 HC RX 250: Performed by: SURGERY

## 2025-07-02 PROCEDURE — 250N000025 HC SEVOFLURANE, PER MIN: Performed by: SURGERY

## 2025-07-02 PROCEDURE — 272N000001 HC OR GENERAL SUPPLY STERILE: Performed by: SURGERY

## 2025-07-02 PROCEDURE — 250N000011 HC RX IP 250 OP 636: Performed by: NURSE ANESTHETIST, CERTIFIED REGISTERED

## 2025-07-02 PROCEDURE — 999N000208 ECHOCARDIOGRAM COMPLETE

## 2025-07-02 PROCEDURE — 88304 TISSUE EXAM BY PATHOLOGIST: CPT | Mod: TC | Performed by: SURGERY

## 2025-07-02 PROCEDURE — 82040 ASSAY OF SERUM ALBUMIN: CPT

## 2025-07-02 PROCEDURE — 86140 C-REACTIVE PROTEIN: CPT | Performed by: STUDENT IN AN ORGANIZED HEALTH CARE EDUCATION/TRAINING PROGRAM

## 2025-07-02 PROCEDURE — 258N000003 HC RX IP 258 OP 636: Performed by: NURSE ANESTHETIST, CERTIFIED REGISTERED

## 2025-07-02 PROCEDURE — 999N000141 HC STATISTIC PRE-PROCEDURE NURSING ASSESSMENT: Performed by: SURGERY

## 2025-07-02 PROCEDURE — 250N000013 HC RX MED GY IP 250 OP 250 PS 637: Performed by: PHYSICIAN ASSISTANT

## 2025-07-02 PROCEDURE — 370N000017 HC ANESTHESIA TECHNICAL FEE, PER MIN: Performed by: SURGERY

## 2025-07-02 PROCEDURE — 85730 THROMBOPLASTIN TIME PARTIAL: CPT | Performed by: STUDENT IN AN ORGANIZED HEALTH CARE EDUCATION/TRAINING PROGRAM

## 2025-07-02 PROCEDURE — 250N000011 HC RX IP 250 OP 636

## 2025-07-02 PROCEDURE — 250N000011 HC RX IP 250 OP 636: Performed by: PHYSICIAN ASSISTANT

## 2025-07-02 PROCEDURE — 86900 BLOOD TYPING SEROLOGIC ABO: CPT | Performed by: NURSE ANESTHETIST, CERTIFIED REGISTERED

## 2025-07-02 PROCEDURE — 44970 LAPAROSCOPY APPENDECTOMY: CPT | Mod: 22 | Performed by: SURGERY

## 2025-07-02 RX ORDER — DEXAMETHASONE SODIUM PHOSPHATE 4 MG/ML
4 INJECTION, SOLUTION INTRA-ARTICULAR; INTRALESIONAL; INTRAMUSCULAR; INTRAVENOUS; SOFT TISSUE
Status: DISCONTINUED | OUTPATIENT
Start: 2025-07-02 | End: 2025-07-02 | Stop reason: HOSPADM

## 2025-07-02 RX ORDER — ONDANSETRON 2 MG/ML
4 INJECTION INTRAMUSCULAR; INTRAVENOUS EVERY 6 HOURS PRN
Status: CANCELLED | OUTPATIENT
Start: 2025-07-02

## 2025-07-02 RX ORDER — NALOXONE HYDROCHLORIDE 0.4 MG/ML
0.1 INJECTION, SOLUTION INTRAMUSCULAR; INTRAVENOUS; SUBCUTANEOUS
Status: DISCONTINUED | OUTPATIENT
Start: 2025-07-02 | End: 2025-07-02 | Stop reason: HOSPADM

## 2025-07-02 RX ORDER — POLYETHYLENE GLYCOL 3350 17 G/17G
17 POWDER, FOR SOLUTION ORAL DAILY
Status: DISPENSED | OUTPATIENT
Start: 2025-07-03

## 2025-07-02 RX ORDER — MEPERIDINE HYDROCHLORIDE 25 MG/ML
12.5 INJECTION INTRAMUSCULAR; INTRAVENOUS; SUBCUTANEOUS EVERY 5 MIN PRN
Status: DISCONTINUED | OUTPATIENT
Start: 2025-07-02 | End: 2025-07-02 | Stop reason: HOSPADM

## 2025-07-02 RX ORDER — SODIUM CHLORIDE, SODIUM LACTATE, POTASSIUM CHLORIDE, CALCIUM CHLORIDE 600; 310; 30; 20 MG/100ML; MG/100ML; MG/100ML; MG/100ML
INJECTION, SOLUTION INTRAVENOUS CONTINUOUS
Status: DISCONTINUED | OUTPATIENT
Start: 2025-07-02 | End: 2025-07-02 | Stop reason: HOSPADM

## 2025-07-02 RX ORDER — ONDANSETRON 2 MG/ML
4 INJECTION INTRAMUSCULAR; INTRAVENOUS EVERY 30 MIN PRN
Status: DISCONTINUED | OUTPATIENT
Start: 2025-07-02 | End: 2025-07-02 | Stop reason: HOSPADM

## 2025-07-02 RX ORDER — CARBOXYMETHYLCELLULOSE SODIUM 5 MG/ML
1 SOLUTION/ DROPS OPHTHALMIC
Status: DISCONTINUED | OUTPATIENT
Start: 2025-07-02 | End: 2025-07-02

## 2025-07-02 RX ORDER — AMOXICILLIN 250 MG
1 CAPSULE ORAL 2 TIMES DAILY
Status: DISPENSED | OUTPATIENT
Start: 2025-07-02

## 2025-07-02 RX ORDER — HYDROMORPHONE HCL IN WATER/PF 6 MG/30 ML
0.2 PATIENT CONTROLLED ANALGESIA SYRINGE INTRAVENOUS
Status: DISCONTINUED | OUTPATIENT
Start: 2025-07-02 | End: 2025-07-03

## 2025-07-02 RX ORDER — BISACODYL 10 MG
10 SUPPOSITORY, RECTAL RECTAL DAILY PRN
Status: ACTIVE | OUTPATIENT
Start: 2025-07-05

## 2025-07-02 RX ORDER — LIDOCAINE 40 MG/G
CREAM TOPICAL
Status: DISCONTINUED | OUTPATIENT
Start: 2025-07-02 | End: 2025-07-02 | Stop reason: HOSPADM

## 2025-07-02 RX ORDER — ACETAMINOPHEN 325 MG/1
975 TABLET ORAL EVERY 8 HOURS
Status: DISPENSED | OUTPATIENT
Start: 2025-07-02

## 2025-07-02 RX ORDER — HYDROMORPHONE HCL IN WATER/PF 6 MG/30 ML
0.4 PATIENT CONTROLLED ANALGESIA SYRINGE INTRAVENOUS EVERY 5 MIN PRN
Status: DISCONTINUED | OUTPATIENT
Start: 2025-07-02 | End: 2025-07-02 | Stop reason: HOSPADM

## 2025-07-02 RX ORDER — HYDROXYZINE HYDROCHLORIDE 10 MG/1
10 TABLET, FILM COATED ORAL EVERY 6 HOURS PRN
Status: DISCONTINUED | OUTPATIENT
Start: 2025-07-02 | End: 2025-07-02 | Stop reason: HOSPADM

## 2025-07-02 RX ORDER — OXYCODONE HYDROCHLORIDE 5 MG/1
5 TABLET ORAL EVERY 4 HOURS PRN
Status: DISPENSED | OUTPATIENT
Start: 2025-07-02

## 2025-07-02 RX ORDER — FENTANYL CITRATE 50 UG/ML
50 INJECTION, SOLUTION INTRAMUSCULAR; INTRAVENOUS EVERY 5 MIN PRN
Status: DISCONTINUED | OUTPATIENT
Start: 2025-07-02 | End: 2025-07-02 | Stop reason: HOSPADM

## 2025-07-02 RX ORDER — BUPIVACAINE HYDROCHLORIDE AND EPINEPHRINE 2.5; 5 MG/ML; UG/ML
INJECTION, SOLUTION EPIDURAL; INFILTRATION; INTRACAUDAL; PERINEURAL PRN
Status: DISCONTINUED | OUTPATIENT
Start: 2025-07-02 | End: 2025-07-02 | Stop reason: HOSPADM

## 2025-07-02 RX ORDER — PROCHLORPERAZINE MALEATE 5 MG/1
5 TABLET ORAL EVERY 6 HOURS PRN
Status: CANCELLED | OUTPATIENT
Start: 2025-07-02

## 2025-07-02 RX ORDER — ONDANSETRON 2 MG/ML
INJECTION INTRAMUSCULAR; INTRAVENOUS PRN
Status: DISCONTINUED | OUTPATIENT
Start: 2025-07-02 | End: 2025-07-02

## 2025-07-02 RX ORDER — DEXAMETHASONE SODIUM PHOSPHATE 4 MG/ML
INJECTION, SOLUTION INTRA-ARTICULAR; INTRALESIONAL; INTRAMUSCULAR; INTRAVENOUS; SOFT TISSUE PRN
Status: DISCONTINUED | OUTPATIENT
Start: 2025-07-02 | End: 2025-07-02

## 2025-07-02 RX ORDER — LIDOCAINE HYDROCHLORIDE 20 MG/ML
INJECTION, SOLUTION INFILTRATION; PERINEURAL PRN
Status: DISCONTINUED | OUTPATIENT
Start: 2025-07-02 | End: 2025-07-02

## 2025-07-02 RX ORDER — HYDROMORPHONE HCL IN WATER/PF 6 MG/30 ML
0.2 PATIENT CONTROLLED ANALGESIA SYRINGE INTRAVENOUS EVERY 5 MIN PRN
Status: DISCONTINUED | OUTPATIENT
Start: 2025-07-02 | End: 2025-07-02 | Stop reason: HOSPADM

## 2025-07-02 RX ORDER — LIDOCAINE 40 MG/G
CREAM TOPICAL
Status: ACTIVE | OUTPATIENT
Start: 2025-07-02

## 2025-07-02 RX ORDER — ACETAMINOPHEN 325 MG/1
975 TABLET ORAL ONCE
Status: DISCONTINUED | OUTPATIENT
Start: 2025-07-02 | End: 2025-07-02 | Stop reason: HOSPADM

## 2025-07-02 RX ORDER — KETAMINE HYDROCHLORIDE 10 MG/ML
INJECTION INTRAMUSCULAR; INTRAVENOUS PRN
Status: DISCONTINUED | OUTPATIENT
Start: 2025-07-02 | End: 2025-07-02

## 2025-07-02 RX ORDER — HEPARIN SODIUM 5000 [USP'U]/.5ML
5000 INJECTION, SOLUTION INTRAVENOUS; SUBCUTANEOUS
Status: DISCONTINUED | OUTPATIENT
Start: 2025-07-02 | End: 2025-07-02 | Stop reason: HOSPADM

## 2025-07-02 RX ORDER — ONDANSETRON 4 MG/1
4 TABLET, ORALLY DISINTEGRATING ORAL EVERY 6 HOURS PRN
Status: CANCELLED | OUTPATIENT
Start: 2025-07-02

## 2025-07-02 RX ORDER — PROPOFOL 10 MG/ML
INJECTION, EMULSION INTRAVENOUS PRN
Status: DISCONTINUED | OUTPATIENT
Start: 2025-07-02 | End: 2025-07-02

## 2025-07-02 RX ORDER — OXYCODONE HYDROCHLORIDE 5 MG/1
10 TABLET ORAL EVERY 4 HOURS PRN
Status: DISPENSED | OUTPATIENT
Start: 2025-07-02

## 2025-07-02 RX ORDER — FENTANYL CITRATE 50 UG/ML
INJECTION, SOLUTION INTRAMUSCULAR; INTRAVENOUS PRN
Status: DISCONTINUED | OUTPATIENT
Start: 2025-07-02 | End: 2025-07-02

## 2025-07-02 RX ORDER — FENTANYL CITRATE 50 UG/ML
25 INJECTION, SOLUTION INTRAMUSCULAR; INTRAVENOUS EVERY 5 MIN PRN
Status: DISCONTINUED | OUTPATIENT
Start: 2025-07-02 | End: 2025-07-02 | Stop reason: HOSPADM

## 2025-07-02 RX ORDER — ONDANSETRON 4 MG/1
4 TABLET, ORALLY DISINTEGRATING ORAL EVERY 30 MIN PRN
Status: DISCONTINUED | OUTPATIENT
Start: 2025-07-02 | End: 2025-07-02 | Stop reason: HOSPADM

## 2025-07-02 RX ORDER — METOPROLOL TARTRATE 1 MG/ML
1-2 INJECTION, SOLUTION INTRAVENOUS EVERY 5 MIN PRN
Status: DISCONTINUED | OUTPATIENT
Start: 2025-07-02 | End: 2025-07-02 | Stop reason: HOSPADM

## 2025-07-02 RX ORDER — HYDROMORPHONE HCL IN WATER/PF 6 MG/30 ML
0.4 PATIENT CONTROLLED ANALGESIA SYRINGE INTRAVENOUS
Status: DISCONTINUED | OUTPATIENT
Start: 2025-07-02 | End: 2025-07-03

## 2025-07-02 RX ORDER — CARBOXYMETHYLCELLULOSE SODIUM 5 MG/ML
1 SOLUTION/ DROPS OPHTHALMIC
Status: DISCONTINUED | OUTPATIENT
Start: 2025-07-02 | End: 2025-07-03

## 2025-07-02 RX ADMIN — PHENYLEPHRINE HYDROCHLORIDE 100 MCG: 10 INJECTION INTRAVENOUS at 17:13

## 2025-07-02 RX ADMIN — ATENOLOL 25 MG: 25 TABLET ORAL at 08:19

## 2025-07-02 RX ADMIN — KETAMINE HYDROCHLORIDE 20 MG: 10 INJECTION INTRAMUSCULAR; INTRAVENOUS at 16:19

## 2025-07-02 RX ADMIN — HYDROMORPHONE HYDROCHLORIDE 0.4 MG: 0.2 INJECTION, SOLUTION INTRAMUSCULAR; INTRAVENOUS; SUBCUTANEOUS at 21:06

## 2025-07-02 RX ADMIN — METRONIDAZOLE 500 MG: 500 INJECTION, SOLUTION INTRAVENOUS at 03:50

## 2025-07-02 RX ADMIN — KETAMINE HYDROCHLORIDE 30 MG: 10 INJECTION INTRAMUSCULAR; INTRAVENOUS at 16:39

## 2025-07-02 RX ADMIN — HUMAN ALBUMIN MICROSPHERES AND PERFLUTREN 2 ML (DILUTED): 10; .22 INJECTION, SOLUTION INTRAVENOUS at 10:37

## 2025-07-02 RX ADMIN — PROPOFOL 120 MG: 10 INJECTION, EMULSION INTRAVENOUS at 16:11

## 2025-07-02 RX ADMIN — PHENYLEPHRINE HYDROCHLORIDE 100 MCG: 10 INJECTION INTRAVENOUS at 16:11

## 2025-07-02 RX ADMIN — ONDANSETRON 4 MG: 2 INJECTION INTRAMUSCULAR; INTRAVENOUS at 16:20

## 2025-07-02 RX ADMIN — HYDROMORPHONE HYDROCHLORIDE 0.4 MG: 0.2 INJECTION, SOLUTION INTRAMUSCULAR; INTRAVENOUS; SUBCUTANEOUS at 06:07

## 2025-07-02 RX ADMIN — FENTANYL CITRATE 50 MCG: 50 INJECTION INTRAMUSCULAR; INTRAVENOUS at 18:34

## 2025-07-02 RX ADMIN — FENTANYL CITRATE 50 MCG: 50 INJECTION INTRAMUSCULAR; INTRAVENOUS at 16:11

## 2025-07-02 RX ADMIN — OXYCODONE HYDROCHLORIDE 5 MG: 5 TABLET ORAL at 20:43

## 2025-07-02 RX ADMIN — FENTANYL CITRATE 100 MCG: 50 INJECTION INTRAMUSCULAR; INTRAVENOUS at 16:37

## 2025-07-02 RX ADMIN — HYDROMORPHONE HYDROCHLORIDE 0.4 MG: 0.2 INJECTION, SOLUTION INTRAMUSCULAR; INTRAVENOUS; SUBCUTANEOUS at 10:11

## 2025-07-02 RX ADMIN — HYDROMORPHONE HYDROCHLORIDE 0.4 MG: 0.2 INJECTION, SOLUTION INTRAMUSCULAR; INTRAVENOUS; SUBCUTANEOUS at 12:29

## 2025-07-02 RX ADMIN — DEXAMETHASONE SODIUM PHOSPHATE 4 MG: 4 INJECTION, SOLUTION INTRA-ARTICULAR; INTRALESIONAL; INTRAMUSCULAR; INTRAVENOUS; SOFT TISSUE at 16:20

## 2025-07-02 RX ADMIN — SODIUM CHLORIDE, POTASSIUM CHLORIDE, SODIUM LACTATE AND CALCIUM CHLORIDE: 600; 310; 30; 20 INJECTION, SOLUTION INTRAVENOUS at 17:22

## 2025-07-02 RX ADMIN — LEVOFLOXACIN 750 MG: 5 INJECTION, SOLUTION INTRAVENOUS at 21:49

## 2025-07-02 RX ADMIN — Medication 200 MG: at 17:34

## 2025-07-02 RX ADMIN — METRONIDAZOLE 500 MG: 500 INJECTION, SOLUTION INTRAVENOUS at 10:53

## 2025-07-02 RX ADMIN — ONDANSETRON 4 MG: 2 INJECTION, SOLUTION INTRAMUSCULAR; INTRAVENOUS at 12:59

## 2025-07-02 RX ADMIN — PROPOFOL 50 MG: 10 INJECTION, EMULSION INTRAVENOUS at 17:22

## 2025-07-02 RX ADMIN — Medication 100 MG: at 16:11

## 2025-07-02 RX ADMIN — SENNOSIDES AND DOCUSATE SODIUM 1 TABLET: 50; 8.6 TABLET ORAL at 20:39

## 2025-07-02 RX ADMIN — METRONIDAZOLE 500 MG: 500 INJECTION, SOLUTION INTRAVENOUS at 20:36

## 2025-07-02 RX ADMIN — Medication 50 MG: at 17:24

## 2025-07-02 RX ADMIN — LIDOCAINE HYDROCHLORIDE 100 MG: 20 INJECTION, SOLUTION INFILTRATION; PERINEURAL at 16:11

## 2025-07-02 RX ADMIN — FENTANYL CITRATE 50 MCG: 50 INJECTION INTRAMUSCULAR; INTRAVENOUS at 16:07

## 2025-07-02 RX ADMIN — ACETAMINOPHEN 975 MG: 325 TABLET ORAL at 20:39

## 2025-07-02 RX ADMIN — FENTANYL CITRATE 50 MCG: 50 INJECTION INTRAMUSCULAR; INTRAVENOUS at 18:23

## 2025-07-02 RX ADMIN — PHENYLEPHRINE HYDROCHLORIDE 0.3 MCG/KG/MIN: 10 INJECTION INTRAVENOUS at 16:25

## 2025-07-02 RX ADMIN — Medication 150 MG: at 17:28

## 2025-07-02 RX ADMIN — PHENYLEPHRINE HYDROCHLORIDE 100 MCG: 10 INJECTION INTRAVENOUS at 16:18

## 2025-07-02 RX ADMIN — PHENYLEPHRINE HYDROCHLORIDE 100 MCG: 10 INJECTION INTRAVENOUS at 16:24

## 2025-07-02 RX ADMIN — HYDROMORPHONE HYDROCHLORIDE 0.4 MG: 0.2 INJECTION, SOLUTION INTRAMUSCULAR; INTRAVENOUS; SUBCUTANEOUS at 00:43

## 2025-07-02 RX ADMIN — HEPARIN SODIUM 1500 UNITS/HR: 10000 INJECTION, SOLUTION INTRAVENOUS at 07:01

## 2025-07-02 ASSESSMENT — ACTIVITIES OF DAILY LIVING (ADL)
ADLS_ACUITY_SCORE: 23

## 2025-07-02 ASSESSMENT — LIFESTYLE VARIABLES: TOBACCO_USE: 1

## 2025-07-02 NOTE — ED NOTES
[facility]   Admission Handoff    The patient is Miguel Baca, 69 year old who arrived in the ED by CAR from home with a complaint of Flank Pain  . The patient's current symptoms are new and during this time the symptoms have decreased. In the ED, patient was diagnosed with   Final diagnoses:   Acute appendicitis with perforation and localized peritonitis, without abscess or gangrene   Abnormal CT scan - IMPRESSION:  1.  Acute appendicitis with a few foci of extraluminal gas along the distal appendix consistent with localized perforation. No fluid collection. 2.  Diffuse hepatic steatosis.   Chronic anticoagulation - On apixaban for history of pulmonary embolism         Needed?: No    Allergies:    Allergies   Allergen Reactions    Ceftriaxone Anaphylaxis       Past Medical Hx:   Past Medical History:   Diagnosis Date    Acute chest pain 12/22/2015    Acute combined systolic and diastolic congestive heart failure (H)     Cellulitis and abscess of unspecified site     Created by Conversion     Colon polyps     Cough     Created by Conversion     Hypertension     Morbid obesity (H)     Primary osteoarthritis of both knees     Pulmonary embolism (H)     Skin cancer     Thrombocytopenia     Urethral stricture        Initial vitals were: BP: (!) 170/110  Pulse: 93  Temp: 100.9  F (38.3  C)  Resp: 20  Weight: (!) 155.6 kg (343 lb)  SpO2: (!) 91 %   Recent vital Signs: /65   Pulse 88   Temp 100.9  F (38.3  C) (Tympanic)   Resp 20   Wt (!) 155.6 kg (343 lb)   SpO2 95%   BMI 50.65 kg/m      Elimination Status: Continent: Yes     Activity Level: Independent    Fall Status: Reason for falls risk: High Risk Medications  nonskid shoes/slippers when out of bed    Baseline Mental status: WDL  Current Mental Status changes: at basesline    Infection present or suspected this encounter: yes other RUPTURED APPY  Sepsis suspected: No    Isolation type: NONE    Bariatric equipment  needed?: No    In the ED these meds were given:   Medications   lactated ringers infusion 1,000 mL ( Intravenous Canceled Entry 7/1/25 1941)   acetaminophen (TYLENOL) tablet 1,000 mg (1,000 mg Oral $Given 7/1/25 1746)   fentaNYL (PF) (SUBLIMAZE) injection 25 mcg (25 mcg Intravenous $Given 7/1/25 1744)   levofloxacin (LEVAQUIN) infusion 750 mg (750 mg Intravenous $New Bag 7/1/25 2052)   metroNIDAZOLE (FLAGYL) infusion 500 mg (0 mg Intravenous Stopped 7/1/25 2052)   acetaminophen (TYLENOL) tablet 1,000 mg (1,000 mg Oral $Given 7/1/25 1541)   lactated ringers BOLUS 500 mL (0 mLs Intravenous Stopped 7/1/25 1941)   ondansetron (ZOFRAN) injection 4 mg (4 mg Intravenous $Given 7/1/25 1745)   iopamidol (ISOVUE-370) solution 149 mL (149 mLs Intravenous $Given 7/1/25 1803)   sodium chloride 0.9 % bag for CT scan flush (77 mLs As instructed $Given 7/1/25 1803)       Drips running?  No    Home pump  No    Current LDAs: Peripheral IV: Site RIGHT AC; Gauge 20  none     Results:   Labs/Imaging  Ordered and Resulted from Time of ED Arrival Up to the Time of Departure from the ED  Recent Results (from the past 24 hours)   CBC with platelets, differential    Narrative    The following orders were created for panel order CBC with platelets, differential.  Procedure                               Abnormality         Status                     ---------                               -----------         ------                     CBC with platelets and ...[[id number]]  Abnormal            Final result                 Please view results for these tests on the individual orders.   Comprehensive metabolic panel   Result Value Ref Range    Sodium 134 (L) 135 - 145 mmol/L    Potassium 5.3 3.4 - 5.3 mmol/L    Carbon Dioxide (CO2) 28 22 - 29 mmol/L    Anion Gap 13 7 - 15 mmol/L    Urea Nitrogen 14.2 8.0 - 23.0 mg/dL    Creatinine 1.10 0.67 - 1.17 mg/dL    GFR Estimate 72 >60 mL/min/1.73m2    Calcium 9.3 8.8 - 10.4 mg/dL    Chloride 93 (L) 98 -  107 mmol/L    Glucose 130 (H) 70 - 99 mg/dL    Alkaline Phosphatase 104 40 - 150 U/L    AST 27 0 - 45 U/L    ALT 25 0 - 70 U/L    Protein Total 8.1 6.4 - 8.3 g/dL    Albumin 4.2 3.5 - 5.2 g/dL    Bilirubin Total 1.3 (H) <=1.2 mg/dL   [place] Draw    Narrative    The following orders were created for panel order [place] Draw.  Procedure                               Abnormality         Status                     ---------                               -----------         ------                     Extra Blue Top Tube[[id number]]                             Final result                 Please view results for these tests on the individual orders.   CBC with platelets and differential   Result Value Ref Range    WBC Count 13.5 (H) 4.0 - 11.0 10e3/uL    RBC Count 5.66 4.40 - 5.90 10e6/uL    Hemoglobin 17.6 13.3 - 17.7 g/dL    Hematocrit 54.5 (H) 40.0 - 53.0 %    MCV 96 78 - 100 fL    MCH 31.1 26.5 - 33.0 pg    MCHC 32.3 31.5 - 36.5 g/dL    RDW 16.1 (H) 10.0 - 15.0 %    Platelet Count 140 (L) 150 - 450 10e3/uL    % Neutrophils 84 %    % Lymphocytes 8 %    % Monocytes 7 %    % Eosinophils 0 %    % Basophils 0 %    % Immature Granulocytes 1 %    NRBCs per 100 WBC 0 <1 /100    Absolute Neutrophils 11.4 (H) 1.6 - 8.3 10e3/uL    Absolute Lymphocytes 1.1 0.8 - 5.3 10e3/uL    Absolute Monocytes 0.9 0.0 - 1.3 10e3/uL    Absolute Eosinophils 0.0 0.0 - 0.7 10e3/uL    Absolute Basophils 0.0 0.0 - 0.2 10e3/uL    Absolute Immature Granulocytes 0.1 <=0.4 10e3/uL    Absolute NRBCs 0.0 10e3/uL   Extra Blue Top Tube   Result Value Ref Range    Hold Specimen JIC    Procalcitonin   Result Value Ref Range    Procalcitonin 0.17 <0.50 ng/mL   UA Macroscopic with reflex to Microscopic and Culture    Specimen: Urine, Clean Catch   Result Value Ref Range    Color Urine Yellow Colorless, Straw, Light Yellow, Yellow    Appearance Urine Clear Clear    Glucose Urine Negative Negative mg/dL    Bilirubin Urine Negative Negative    Ketones Urine Trace  (A) Negative mg/dL    Specific Gravity Urine 1.023 1.003 - 1.035    Blood Urine Negative Negative    pH Urine 8.5 (H) 5.0 - 7.0    Protein Albumin Urine 30 (A) Negative mg/dL    Urobilinogen Urine 2.0 (A) Normal mg/dL    Nitrite Urine Negative Negative    Leukocyte Esterase Urine Negative Negative    RBC Urine 1 <=2 /HPF    WBC Urine 2 <=5 /HPF    Squamous Epithelials Urine 1 <=1 /HPF    Narrative    Urine Culture not indicated   CT Abdomen Pelvis w Contrast   Result Value Ref Range    Radiologist flags Perforated viscus (AA)     Narrative    EXAM: CT ABDOMEN PELVIS W CONTRAST  LOCATION: [facility]  DATE: 7/1/2025    INDICATION: Acute right lower quadrant abdominal pain.  Anticoagulated   Hx of VTE (Bilateral PE). Large BMI.  Eval for acute diverticulitis and appendicitis or obstructing urinary stone vs other acute catastrophe  COMPARISON: 11/3/2023  TECHNIQUE: CT scan of the abdomen and pelvis was performed following injection of IV contrast. Multiplanar reformats were obtained. Dose reduction techniques were used.  CONTRAST: 149 ml Isovue 370    FINDINGS:   LOWER CHEST: Normal.    HEPATOBILIARY: Diffuse hepatic steatosis. No significant mass. No bile duct dilatation. No calcified gallstones.    PANCREAS: Normal.    SPLEEN: Normal.    ADRENAL GLANDS: Normal.    KIDNEYS/BLADDER: No significant mass, stone, or hydronephrosis. Bladder is underdistended with circumferential wall thickening.    BOWEL: Dilated appendix up to 1.2 cm with wall thickening and periappendiceal fat stranding. There are a few foci of extraluminal gas adjacent to the distal appendix (e.g. 3/56). No calcified appendicolith or fluid collection.    LYMPH NODES: Normal.    VASCULATURE: No abdominal aortic aneurysm.    PELVIC ORGANS: Normal.    MUSCULOSKELETAL: Tiny fat-containing paraumbilical hernia. Bilateral gynecomastia.      Impression    IMPRESSION:   1.  Acute appendicitis with a few foci of extraluminal gas along  the distal appendix consistent with localized perforation. No fluid collection.  2.  Diffuse hepatic steatosis.    [Critical Result: Perforated viscus]    Finding was identified on 7/1/2025 6:54 PM CDT.     Corey Villagran was contacted by me on 7/1/2025 7:00 PM CDT and verbalized understanding of the critical result.       For the majority of the shift this patient's behavior was Green     Cardiac Rhythm: Normal Sinus  Pt needs tele? No  Skin/wound Issues: DRY FLAKY SKIN, MULTIPLE SKIN TAGS ALL OVER TRUNK    Code Status: Full Code    Pain control: good    Nausea control: good    Abnormal labs/tests/findings requiring intervention:     Patient tested for COVID 19 prior to admission: NO     OBS brochure/video discussed/provided to patient/family: N/A     Family present during ED course? No     Family Comments/Social Situation comments:     Tasks needing completion: None    Susu Montgomeyr RN

## 2025-07-02 NOTE — ANESTHESIA PREPROCEDURE EVALUATION
Anesthesia Pre-Procedure Evaluation    Patient: Miguel Baca   MRN: [id number] : 1955          Procedure : Procedure(s):  APPENDECTOMY, LAPAROSCOPIC         Past Medical History:   Diagnosis Date    Acute chest pain 2015    Acute combined systolic and diastolic congestive heart failure (H)     Cellulitis and abscess of unspecified site     Created by Conversion     Colon polyps     Cough     Created by Conversion     Hypertension     Morbid obesity (H)     Primary osteoarthritis of both knees     Pulmonary embolism (H)     Skin cancer     Thrombocytopenia     Urethral stricture       Past Surgical History:   Procedure Laterality Date    ARTHROSCOPY KNEE Left     CYSTOSCOPY, DILATE URETHRA, COMBINED N/A 9/10/2024    Procedure: CYSTOSCOPY, URETHRAL DILATION;  Surgeon: Felix Lloyd MD;  Location: [facility] OR    RETROGRADE URETHROGRAM N/A 9/10/2024    Procedure: RETROGRADE URETHROGRAM, OPTILUME BALLOON URETHRAL DILATION;  Surgeon: Felix Lloyd MD;  Location: [facility] OR      Allergies   Allergen Reactions    Ceftriaxone Anaphylaxis      Social History     Tobacco Use    Smoking status: Former     Types: Cigarettes    Smokeless tobacco: Never   Substance Use Topics    Alcohol use: Not on file      Wt Readings from Last 1 Encounters:   25 (!) 154.4 kg (340 lb 6.2 oz)        Anesthesia Evaluation   Pt has had prior anesthetic. Type: General, MAC and Regional.        ROS/MED HX  ENT/Pulmonary:     (+)                tobacco use, Past use,                       Neurologic:       Cardiovascular:     (+)  hypertension- -  CAD angina-  - -   Taking blood thinners   CHF                           Previous cardiac testing   Echo: Date: 25 Results:  Interpretation Summary     Left ventricular systolic function is normal.  The visual ejection fraction is 55-60%.  No regional wall motion abnormalities noted.  The study was technically difficult. There is no comparison study  "available.    Stress Test:  Date: Results:    ECG Reviewed:  Date: 7/1/25 Results:    Sinus rhythm with Premature atrial complexes  Otherwise normal ECG      Cath:  Date: Results:      METS/Exercise Tolerance: 3 - Able to walk 1-2 blocks without stopping    Hematologic: Comments: Hx bilateral PE, on elequis     (+) History of blood clots,     anemia,          Musculoskeletal:   (+)  arthritis,             GI/Hepatic:       Renal/Genitourinary:       Endo:     (+)               Obesity,       Psychiatric/Substance Use:       Infectious Disease:       Malignancy:       Other:              Physical Exam  Airway  Mallampati: III  TM distance: >3 FB  Neck ROM: full  Mouth opening: >= 4 cm    Cardiovascular - normal exam  Rhythm: regular  Rate: normal rate     Dental   (+) Modest Abnormalities - crowns, retainers, 1 or 2 missing teeth      Pulmonary - normal exam      Neurological - normal exam  He appears awake, alert and oriented x3.    Other Findings       OUTSIDE LABS:  CBC:   Lab Results   Component Value Date    WBC 13.4 (H) 07/02/2025    WBC 15.9 (H) 07/01/2025    HGB 16.1 07/02/2025    HGB 17.0 07/01/2025    HCT 50.6 07/02/2025    HCT 52.2 07/01/2025     (L) 07/02/2025     (L) 07/01/2025     BMP:   Lab Results   Component Value Date     (L) 07/02/2025     (L) 07/01/2025    POTASSIUM 5.0 07/02/2025    POTASSIUM 5.3 07/01/2025    CHLORIDE 95 (L) 07/02/2025    CHLORIDE 93 (L) 07/01/2025    CO2 26 07/02/2025    CO2 28 07/01/2025    BUN 11.9 07/02/2025    BUN 14.2 07/01/2025    CR 1.06 07/02/2025    CR 1.10 07/01/2025     (H) 07/02/2025     (H) 07/01/2025     COAGS:   Lab Results   Component Value Date    PTT 33 07/02/2025     POC: No results found for: \"BGM\", \"HCG\", \"HCGS\"  HEPATIC:   Lab Results   Component Value Date    ALBUMIN 3.5 07/02/2025    PROTTOTAL 6.6 07/02/2025    ALT 18 07/02/2025    AST 28 07/02/2025    ALKPHOS 94 07/02/2025    BILITOTAL 1.3 (H) 07/02/2025 " "    OTHER:   Lab Results   Component Value Date    A1C 7.1 (H) 06/27/2025    FILI 8.5 (L) 07/02/2025    TSH 3.12 08/21/2024       Anesthesia Plan    ASA Status:  3, emergent      NPO Status: NPO Appropriate   Anesthesia Type: General.  Airway: oral.  Induction: intravenous.  Maintenance: Balanced.   Techniques and Equipment:       - Monitoring Plan: standard ASA monitoring     Consents    Anesthesia Plan(s) and associated risks, benefits, and realistic alternatives discussed. Questions answered and patient/representative(s) expressed understanding.     - Discussed:     - Discussed with:  Patient        - Pt is DNR/DNI Status: no DNR     Blood Consent:      - Discussed with: not discussed.     Postoperative Care         Comments:                   HILDA Davis CRNA    I have reviewed the pertinent notes and labs in the chart from the past 30 days and (re)examined the patient.  Any updates or changes from those notes are reflected in this note.    Clinically Significant Risk Factors Present on Admission         # Hyponatremia: Lowest Na = 132 mmol/L in last 2 days, will monitor as appropriate  # Hypochloremia: Lowest Cl = 93 mmol/L in last 2 days, will monitor as appropriate  # Hypocalcemia: Lowest Ca = 8.5 mg/dL in last 2 days, will monitor and replace as appropriate      # Drug Induced Coagulation Defect: home medication list includes an anticoagulant medication  # Thrombocytopenia: Lowest platelets = 121 in last 2 days, will monitor for bleeding   # Hypertension: Noted on problem list  # Chronic heart failure with preserved ejection fraction: heart failure noted on problem list and last echo with EF >50%         # DMII: A1C = 7.1 % (Ref range: 0.0 - 5.6 %) within past 6 months    # Morbid Obesity: Estimated body mass index is 50.27 kg/m  as calculated from the following:    Height as of this encounter: 1.753 m (5' 9\").    Weight as of this encounter: 154.4 kg (340 lb 6.2 oz).                    "

## 2025-07-02 NOTE — PROGRESS NOTES
[facility] TRANSPORT NOTE  Data:   Reason for Transport:  surgery    Miguel Baca was transported to [facility] via wheel chair at 1500.  Patient was accompanied by Registered Nurse. Equipment used for transport: None. Family was aware of reason for transport: yes    Action:  Report: given to Zuleyka    Response:  Patient's condition when transferred off unit was alert and stable.    Dillan Skinner RN

## 2025-07-02 NOTE — OR NURSING
Report from bonita RN for continued cares. RT here and in attendance managing Bipap . With setting per their direction Pt on 50% fiO2 and spo2 is 89%-90% Pt ias waking up after being obtunded with high CO2 . . Pt rates pain 5/10

## 2025-07-02 NOTE — PROGRESS NOTES
Brief General Surgery Note    S/p appendectomy for perforated appendicitis    Fransisco drain to bulb suction, empty and record output every shift    Resume IV antibiotics: Levaquin, Flagyl    If FRANSISCO drain output is serous or serosanguinous, okay to resume heparin drip (then anticoagulation per medicine team) tomorrow 7/3 AM    Continue IV fluids, pain medications as needed    Can use abdominal binder as needed for comfort    Can use ice as needed for comfort    Okay for clear liquid diet only    Report called to [facility] [employer] [facility] clinic. Spoke to [employer]. No concerns voiced. Son Brooklynn Alex updated on patient's transfer, gave room number and phone number to unit. No concerns voiced.

## 2025-07-02 NOTE — PLAN OF CARE
End of Shift Note    Situation:Right lower quadrant pain.    Plan: IV fluids. Pain meds. Surgery consult.     Subjective/Objective    Neuro:Afebrile since admission. A&O. Dilaudid x 2.     Cardiac:Normotensive.     Resp:2L O2. Not normally on O2. History of PE normally on Eliquis- held medication due to possible surgery. Heparin gtt started. Monitoring PTT.     GI/: Voiding without issues. Last BM unknown. Hypoactive bowel sounds. NPO since 0000    MSK: A1 to bathroom.     Skin: Dry, scaly. Recently had skin tags removed- scabs noted on face and bilateral axillary areas.     LDA: PIV x2. /hr. Heparin gtt.

## 2025-07-02 NOTE — PROGRESS NOTES
[facility]    Medicine Progress Note - Hospitalist Service    Date of Admission:  7/1/2025    Assessment & Plan   Miguel Baca is a 69 year old male with past medical history significant for bilateral pulmonary embolism on chronic anticoagulation, HTN, HFpEF, urinary retention who presents on 7/1/2025 with RLQ abdominal pain.     Update: Heparin gtt paused at 11am. OR today ~330-4pm. Resume AC per surgery recs post-op vs resuming doac next day.     Acute appendicitis with perforation and localized peritonitis, without abscess or gangrene  Presented with progressive RLQ abd pain.   CT abd/pelvis with Acute appendicitis with a few foci of extraluminal gas along the distal appendix consistent with localized perforation. No fluid collection.   WBC 13.5, ANC 11.4. Procalcitonin wnl.   Febrile to 100.9F on ED arrival, VS otherwise reassuring. Received 500mL bolus LR in the ED and started on levofloxacin and metronidazole.   Admitting hospitalist also discussed with Dr. Pineda who supported heparin gtt and advised stopping this 4 hours prior to surgery.   - IV levofloxacin 750mg qdaily   - IV metronidazole 500mg q8hrs   - General surgery consulted   - heparin gtt while holding apixaban; stop heparin 4 hours prior to surgery ; resume post-op per surgery  - acetaminophen, oxycodone PO, hydromorphone IV     Acute hypoxic respiratory failure   Was not hypoxic on ED arrival. Developed mild hypoxia after receiving 25mcg IV fentanyl, now requiring 2L via NC to maintain SpO2 >90%. No recent illness or respiratory symptoms.   Suspect acute hypoxia secondary to hypoventilation in the setting of sedating medications.   - Continuous pulse oximetry; supplemental O2 prn    Surgery Clearance and RCRI Risk Assessment:   Anesthesia issues: 0  Baseline Activity: 3 METS   Chest Pain: 0  Shortness of breath: 1  Cardiac Risk Factors/Assessment:                High Risk Surgery: 0               History Ischemic Heart  Disease: 0               History of Congestive Heart Failure: 1              History of CVA: 1              Preoperative Treatment with Insulin: 0              Preoperative Creatinine greater than 2.0: 0              Total Number of Points: 2 = 3.6% risk of major cardiac event (0 = 0.5; 1 = 1.3%; 2 = 3.6%; 3+ = 9.1%)  - EKG pending   - no further pre-operative diagnostics needed     Essential hypertension   Normotensive on admission.   - Continued pta atenolol     Heart failure with preserved ejection fraction   Echocardiogram 10/30/2023 with EF 55-60%, moderate right ventricular chamber enlargement.   BNP 80. No pitting LE edema.   - Repeat echocardiogram ordered   - Hold pta diuretics and aldactone     Hepatic steatosis   CT abd/pelvis with diffuse hepatic steatosis.     Hx bilateral pulmonary embolism (10/2023)  Chronic anticoagulation   Noted on chart review. CT PE study at that time showed pulmonary emboli with multiple bilateral segmental and subsegmental acute appearing pulmonary emboli.   - Holding pta apixaban; heparin drip ordered   - HOLD heparin gtt 4 hours prior to surgery     Urinary retention   Urethral stricture  Follows with [place] [facility] last visit 04/09/25.           Diet: NPO for Procedure/Surgery per Anesthesia Guidelines Except for: Meds; Clear liquids before procedure/surgery: ADULT (Age GREATER than or Equal to 18 years) - Clear liquids 2 hours before procedure/surgery    DVT Prophylaxis: Heparin gtt  Erickson Catheter: Not present  Lines: None     Cardiac Monitoring: None  Code Status: Full Code      Clinically Significant Risk Factors Present on Admission         # Hyponatremia: Lowest Na = 132 mmol/L in last 2 days, will monitor as appropriate  # Hypochloremia: Lowest Cl = 93 mmol/L in last 2 days, will monitor as appropriate  # Hypocalcemia: Lowest Ca = 8.5 mg/dL in last 2 days, will monitor and replace as appropriate        # Drug Induced Coagulation Defect: home medication list includes an  "anticoagulant medication  # Thrombocytopenia: Lowest platelets = 121 in last 2 days, will monitor for bleeding   # Hypertension: Noted on problem list  # Chronic heart failure with preserved ejection fraction: heart failure noted on problem list and last echo with EF >50%         # DMII: A1C = 7.1 % (Ref range: 0.0 - 5.6 %) within past 6 months    # Morbid Obesity: Estimated body mass index is 50.27 kg/m  as calculated from the following:    Height as of this encounter: 1.753 m (5' 9\").    Weight as of this encounter: 154.4 kg (340 lb 6.2 oz).              Social Drivers of Health    Tobacco Use: Medium Risk (6/30/2025)    Patient History     Smoking Tobacco Use: Former     Smokeless Tobacco Use: Never          Disposition Plan     Medically Ready for Discharge: Anticipated in 2-4 Days    1-2 days pending intra-op findings         Demetris Collier DO  Hospitalist Service  [facility]  Securely message with [employer] (more info)  Text page via [employer] Paging/Directory   ______________________________________________________________________    Interval History   No acute events    Physical Exam   Vital Signs: Temp: 98.1  F (36.7  C) Temp src: Oral BP: (!) 141/83 Pulse: 100   Resp: 20 SpO2: 93 % O2 Device: Nasal cannula Oxygen Delivery: 2 LPM  Weight: 340 lbs 6.24 oz    Physical Exam  Constitutional:       Appearance: He is obese.   Eyes:      General: No scleral icterus.  Cardiovascular:      Rate and Rhythm: Normal rate and regular rhythm.      Heart sounds: No murmur heard.  Pulmonary:      Breath sounds: No wheezing, rhonchi or rales.   Abdominal:      Tenderness: There is abdominal tenderness (RLQ). There is no guarding or rebound.   Musculoskeletal:      Right lower leg: Edema (chronic venous insufficiency) present.      Left lower leg: Edema (chronic venous insufficiency) present.   Neurological:      General: No focal deficit present.           Medical Decision Making       40 MINUTES SPENT BY " ME on the date of service doing chart review, history, exam, documentation & further activities per the note.      Data     I have personally reviewed the following data over the past 24 hrs:    13.4 (H)  \   16.1   / 121 (L)     132 (L) 95 (L) 11.9 /  154 (H)   5.0 26 1.06 \     ALT: 18 AST: 28 AP: 94 TBILI: 1.3 (H)   ALB: 3.5 TOT PROTEIN: 6.6 LIPASE: N/A     Trop: N/A BNP: 80     Procal: 0.17 CRP: 203.72 (H) Lactic Acid: N/A       INR:  N/A PTT:  37   D-dimer:  N/A Fibrinogen:  N/A       Imaging results reviewed over the past 24 hrs:   Recent Results (from the past 24 hours)   CT Abdomen Pelvis w Contrast   Result Value    Radiologist flags Perforated viscus (AA)    Narrative    EXAM: CT ABDOMEN PELVIS W CONTRAST  LOCATION: [facility]  DATE: 7/1/2025    INDICATION: Acute right lower quadrant abdominal pain.  Anticoagulated   Hx of VTE (Bilateral PE). Large BMI.  Eval for acute diverticulitis and appendicitis or obstructing urinary stone vs other acute catastrophe  COMPARISON: 11/3/2023  TECHNIQUE: CT scan of the abdomen and pelvis was performed following injection of IV contrast. Multiplanar reformats were obtained. Dose reduction techniques were used.  CONTRAST: 149 ml Isovue 370    FINDINGS:   LOWER CHEST: Normal.    HEPATOBILIARY: Diffuse hepatic steatosis. No significant mass. No bile duct dilatation. No calcified gallstones.    PANCREAS: Normal.    SPLEEN: Normal.    ADRENAL GLANDS: Normal.    KIDNEYS/BLADDER: No significant mass, stone, or hydronephrosis. Bladder is underdistended with circumferential wall thickening.    BOWEL: Dilated appendix up to 1.2 cm with wall thickening and periappendiceal fat stranding. There are a few foci of extraluminal gas adjacent to the distal appendix (e.g. 3/56). No calcified appendicolith or fluid collection.    LYMPH NODES: Normal.    VASCULATURE: No abdominal aortic aneurysm.    PELVIC ORGANS: Normal.    MUSCULOSKELETAL: Tiny fat-containing  paraumbilical hernia. Bilateral gynecomastia.      Impression    IMPRESSION:   1.  Acute appendicitis with a few foci of extraluminal gas along the distal appendix consistent with localized perforation. No fluid collection.  2.  Diffuse hepatic steatosis.    [Critical Result: Perforated viscus]    Finding was identified on 2025 6:54 PM CDT.     Corey Villagran was contacted by me on 2025 7:00 PM CDT and verbalized understanding of the critical result.   Echocardiogram Complete   Result Value    LVEF  55-60%    Narrative    [id number]  [id number]  [id number]  [id number]^DONY^JESÚS^     [facility]  Echocardiography Laboratory  [address] [place] [address]     Name: DONNA DEWITT  MRN: [id number]  : 1955  Study Date: 2025 10:19 AM  Age: 69 yrs  Gender: Male  Patient Location: [facility]  Reason For Study: Heart Failure  Ordering Physician: JESÚS NAPOLES  Referring Physician: Yahaira Fernandez  Performed By: Bruna Sahni RDCS     BSA: 2.6 m2  Height: 69 in  Weight: 341 lb  HR: 85  BP: 146/80 mmHg  ______________________________________________________________________________  Procedure  Echocardiogram with two-dimensional, color and spectral Doppler. Optison (NDC  #[id number]) given intravenously.  ______________________________________________________________________________  Interpretation Summary     Left ventricular systolic function is normal.  The visual ejection fraction is 55-60%.  No regional wall motion abnormalities noted.  The study was technically difficult. There is no comparison study available.  ______________________________________________________________________________  Left Ventricle  The left ventricle is normal in size. There is normal left ventricular wall  thickness. Grade I or early diastolic dysfunction. Left ventricular systolic  function is normal. The visual ejection fraction is 55-60%. No regional wall  motion abnormalities noted.      Right Ventricle  Right ventricular function cannot be assessed due to poor image quality.     Atria  The left atrium is not well visualized. Right atrium not well visualized.     Mitral Valve  There is mild mitral annular calcification. There is trace mitral  regurgitation.     Tricuspid Valve  No tricuspid regurgitation. Right ventricular systolic pressure could not be  approximated due to inadequate tricuspid regurgitation. IVC diameter and  respiratory changes fall into an intermediate range suggesting an RA pressure  of 8 mmHg.     Aortic Valve  There is mild trileaflet aortic sclerosis. No aortic stenosis is present.     Pulmonic Valve  The pulmonic valve is not well visualized.     Vessels  The aortic root is normal size.     Pericardium  There is no pericardial effusion.     Rhythm  Sinus rhythm was noted.  ______________________________________________________________________________  MMode/2D Measurements & Calculations  IVSd: 1.0 cm     LVIDd: 5.5 cm  LVIDs: 3.2 cm  LVPWd: 1.0 cm  FS: 42.7 %  LV mass(C)d: 217.8 grams  LV mass(C)dI: 84.1 grams/m2  Ao root diam: 3.7 cm  LA dimension: 3.1 cm  asc Aorta Diam: 3.8 cm  LA/Ao: 0.83  Ao root diam index Ht(cm/m): 2.1  Ao root diam index BSA (cm/m2): 1.4  Asc Ao diam index BSA (cm/m2): 1.5  Asc Ao diam index Ht(cm/m): 2.2  LA Volume (BP): 44.7 ml     LA Volume Index (BP): 17.3 ml/m2  RWT: 0.36     Doppler Measurements & Calculations  MV E max ana: 54.8 cm/sec  MV A max ana: 84.6 cm/sec  MV E/A: 0.65  MV dec time: 0.19 sec  PA acc time: 0.13 sec  E/E' avg: 10.0  Lateral E/e': 7.0  Medial E/e': 13.0     ______________________________________________________________________________  Report approved by: Triston Garcia MD on 07/02/2025 11:10 AM

## 2025-07-02 NOTE — ANESTHESIA CARE TRANSFER NOTE
Patient: Miguel Baca    Procedure: Procedure(s):  APPENDECTOMY, LAPAROSCOPIC with drain placement       Diagnosis: Acute appendicitis with perforation and localized peritonitis, without abscess or gangrene [K35.32]  Chronic anticoagulation [Z79.01]  Diagnosis Additional Information: No value filed.    Anesthesia Type:   No value filed.     Note:    Oropharynx: spontaneously breathing, nasal airway in place and oropharynx clear of all foreign objects  Level of Consciousness: drowsy  Oxygen Supplementation: face mask  Level of Supplemental Oxygen (L/min / FiO2): 10  Independent Airway: airway patency satisfactory and stable  Dentition: dentition unchanged  Vital Signs Stable: post-procedure vital signs reviewed and stable  Report to RN Given: handoff report given  Patient transferred to: PACU  Comments: On arrival to PACU, SpO2 88-90% on oxygen mask, transitioned to BiPAP with RT  Handoff Report: Identifed the Patient, Identified the Reponsible Provider, Reviewed the pertinent medical history, Discussed the surgical course, Reviewed Intra-OP anesthesia mangement and issues during anesthesia, Set expectations for post-procedure period and Allowed opportunity for questions and acknowledgement of understanding      Vitals:  Vitals Value Taken Time   /85 07/02/25 18:00   Temp 37.9  C (100.2  F) 07/02/25 17:48   Pulse 90 07/02/25 18:13   Resp 23 07/02/25 18:13   SpO2 88 % 07/02/25 18:13   Vitals shown include unfiled device data.    Electronically Signed By: HILDA Palacio CRNA  July 2, 2025  6:15 PM

## 2025-07-02 NOTE — PROGRESS NOTES
Started patient on our V60 BiPAP due to hypoxia while asleep, post op. Pt in fowlers position, no obstructive apneas noted.  Pt had simple mask on at 10-15 lpm with sat's 87%.  Could not find diagnosis of JOSE ELIAS, or ever had a sleep study in chart.  Discussed with CRNA and he did not know of any sleep study history as well.  Patient was placed on V60 vs home BiPAP unit.  Settings currently on V60 BiPAP are 14/8 cmH2O rate of 14 and 50% FiO2.  VBG obtained which showed 7.27/69/HCO3 31.    Abhijeet Mijares RT on 7/2/2025 at 6:18 PM

## 2025-07-02 NOTE — OP NOTE
OPERATIVE NOTE  [facility] SURGERY    DATE:   7/2/2025    SURGEON:   Keshia Pineda DO      PRE-OPERATIVE DIAGNOSIS:   Acute perforated appendicitis.  Acute hypoxic respiratory failure  Htn  Hepatic steatosis  Heart failure with preserved EF 55-60%  History of bilateral PE (apixaban)  Morbid obesity, BMI 50    POSTOPERATIVE DIAGNOSIS:   same    OPERATION:  Laparoscopic appendectomy with drain placement.   Modifier 22 - 20-30 mins was added to total time due to the difficulty of the case and pt's size.  Making total time 80 mins    ANESTHESIA:   General endotracheal.     INDICATIONS FOR PROCEDURE:   Miguel Baca is a 69 year old male who presented with abdominal pain.  Workup of this pain revealed acute perforated appendicitis.  I reviewed the imaging and noted area of perforation is at the tip/body but the base was intact.  However, pt was on apixaban for his bilateral PE; Thus I asked medicine team to help admit patient for his multiple comorbidities and hold the apixaban as plan for lap appendectomy  in 24hrs.     FINDINGS:   Acute perforated appendicitis.  Appendix was perforated at the distal body and the appendicolith did dislodge.  The hard stool was a caught and removed with minimal contamination.  I had to add an extra 5mm trocar at the RUQ due to patient's morbid obesity and later on upstage the 5mm to a 12mm trocar for the stapler.        PROCEDURE:   The patient was preoperatively identified. Consent was signed and placed on the chart. She/he was brought back to the operative suite where he was laid supine on the operating table. General endotracheal anesthesia was induced per anesthesia protocol. She/he was then prepped and draped in sterile fashion. We started by infiltrating 5 cc of local anesthetic in the right upper quadrant area and made small skin incision and accessing the abdominal cavity by using Optiview trocar and 5-mm trocar site visualizing all layers of the abdominal wall until we  reached the peritoneal cavity. We then insufflated  with CO2 gas to create pneumoperitoneum.  Brief glance of the abdomen showed that terminal ileum well adherent against the inferior aspect of the cecum covering the appendix.  Next, a 5-millimeter ports were placed at infraumbilical and 5mm port in the left lower quadrant position. Both were placed after adequate local anesthesia and under direct laparoscopic visualization. After placement of all ports the terminal ileum was pulled away from the cecum. The appendix was plastered against the cecum.  I was able to bluntly pulled the appendix from the cecum; the mesoappendix was not clearly seen as it is plastered at the cecum.  I was able to separate this, however did encounter bleeding needing a suction and irrigation along with surgicel to achieve hemotsasis.   We created a window in the appendiceal mesentery at the base of the appendix through which our stapler could be passed. We then divided the appendix at its base with an endoGIA 45mm laparoscopic stapler (blue). I had to place a 5mm followed by a 12 mm at the RUQ as a 4th trochar due to pt's size.  Next, the appendix was elevated and the mesoappendix was divided with a ligatsure. At this point, the appendix was completely free. It was removed from the abdomen by way of the EndoCatch bag. Suction irrigation was then used to copiously irrigate the RLQ. All staple lines were inspected and found to be hemostatic with nice approximation. Then FRANSISCO drain was placed via the suprapubic incision; brought towards the RLQ area.  Standard drain stitched was used to tied it in at the skin.  Next, the laparoscope was removed and all laparoscopic ports were removed. Pneumoperitoneum was released and the 12mm port incision was closed with an 0 Vicryl on a UR 6 in a simple interrupted fashion transfascially with the cone/PMI.  Skin incisions were closed with 4-0 Monocryl in a simple interrupted buried fashion.  Exofin was used  to further reinforce the skin.  The patient tolerated the procedure well and was transferred to the postanesthesia recovery room in stable condition.       [facility] access hospitalo, [facility]

## 2025-07-02 NOTE — INTERIM SUMMARY
Interval note     Patient requiring BIPAP post operatively. [place] [facility] [place]. Orders placed. Admitting team will follow up.    Nilton Medina MD

## 2025-07-02 NOTE — MEDICATION SCRIBE - ADMISSION MEDICATION HISTORY
Medication Scribe Admission Medication History    Admission medication history is complete. The information provided in this note is only as accurate as the sources available at the time of the update.    Information Source(s): Patient via in-person    Pertinent Information: pt is due for his metformin    Changes made to PTA medication list:  Added: None  Deleted: None  Changed: None    Allergies reviewed with patient and updates made in EHR: yes    Medication History Completed By: Mireya Ríos 7/1/2025 8:20 PM    PTA Med List   Medication Sig Last Dose/Taking    apixaban ANTICOAGULANT (ELIQUIS ANTICOAGULANT) 5 MG tablet Take 1 tablet (5 mg) by mouth 2 times daily. 7/1/2025 Morning    atenolol (TENORMIN) 25 MG tablet Take 1 tablet (25 mg) by mouth daily. 7/1/2025 Morning    bumetanide (BUMEX) 1 MG tablet Take 1 tablet (1 mg) by mouth 2 times daily. 7/1/2025 Morning    metFORMIN (GLUCOPHAGE XR) 500 MG 24 hr tablet Take 1 tablet (500 mg) by mouth daily (with dinner). 6/30/2025 Evening    spironolactone (ALDACTONE) 25 MG tablet Take 2 tablets (50 mg) by mouth every morning. 7/1/2025 Morning    tiZANidine (ZANAFLEX) 4 MG tablet Take 1 tablet (4 mg) by mouth nightly as needed for muscle spasms. 6/30/2025 Bedtime

## 2025-07-02 NOTE — CONSULTS
Surgical Consultation  [facility] General Surgery    Miguel is seen in consultation for abdominal pain, at the request of Dr. Collier.    Chief Complaint:  abdominal pain     History of Present Illness: Miguel Baca is a 69 year old male with PMHx significant for bilateral pulmonary embolism on eliquis, HTN, HFpEF who presented to ED with right lower quadrant abdominal pain. He states that he woke up on 7.1.2025 with acute right sided abdominal pain. This was progressing with worsening RLQ abdominal pain, and this prompted him to present for evaluation. He felt some increasing fatigue, but did not have any nausea, vomiting, fevers or chills. He has not had anything to eat since his presentation to the ED.     He states that he has never had abdominal surgery before.   He had a colonoscopy in 2015 which demonstrated tubular adenoma. He was recommended for repeat colonoscopy in 5 years. He has not had repeat colonoscopy.     Patient did receive pain medications in ED which caused hypoxia. Patient has now been on NC per O2 due to decreased saturations without O2. He denies feeling any shortness of breath, chest pain, or difficulty breathing.     Patient Active Problem List   Diagnosis    Essential Hypertension    Seborrheic Keratosis    Knee Sprain    Primary osteoarthritis of both knees    Bilateral pulmonary embolism (H)    Acute combined systolic and diastolic congestive heart failure (H)    Thrombocytopenia    Acute congestive heart failure, unspecified heart failure type (H)    Hypertensive heart disease with heart failure (H)    Abnormal CT scan    Acute appendicitis with perforation and localized peritonitis, without abscess or gangrene       Past Medical History:   Diagnosis Date    Acute chest pain 12/22/2015    Acute combined systolic and diastolic congestive heart failure (H)     Cellulitis and abscess of unspecified site     Created by Conversion     Colon polyps     Cough     Created by Conversion      Hypertension     Morbid obesity (H)     Primary osteoarthritis of both knees     Pulmonary embolism (H)     Skin cancer     Thrombocytopenia     Urethral stricture        Past Surgical History:   Procedure Laterality Date    ARTHROSCOPY KNEE Left 2012    CYSTOSCOPY, DILATE URETHRA, COMBINED N/A 9/10/2024    Procedure: CYSTOSCOPY, URETHRAL DILATION;  Surgeon: Felix Lloyd MD;  Location: [facility] OR    RETROGRADE URETHROGRAM N/A 9/10/2024    Procedure: RETROGRADE URETHROGRAM, OPTILUME BALLOON URETHRAL DILATION;  Surgeon: Felix Lloyd MD;  Location: [facility] OR       Family History   Problem Relation Age of Onset    Coronary Artery Disease Father         s/p stents, CABG    Coronary Artery Disease Brother         s/p stents    Cancer Mother     Parkinsonism Paternal Grandmother     Diabetes Type 2  Paternal Grandmother        Social History     Tobacco Use    Smoking status: Former     Types: Cigarettes    Smokeless tobacco: Never   Substance Use Topics    Alcohol use: Not on file        History   Drug Use Not on file       No current outpatient medications on file.       Allergies   Allergen Reactions    Ceftriaxone Anaphylaxis       Review of Systems:   Constitutional - Denies fevers, weight loss, malaise, lethargy  Neuro - Denies tremors or seizures  Pulmon - Denies SOB, dyspnea, hemoptysis, chronic cough or use of an inhaler  CV - Denies CP, SOB, lower extremity edema, difficulty w/ stairs, has never used NTG  GI - Denies hematemesis, BRBPR, melena, chronic diarrhea or epigastric pain, endorses right lower quadrant abdominal pain    - Denies hematuria, difficulty voiding, h/o STDs  Hematology - Denies blood clotting disorders, chronic anemias  Dermatology - No melanomas or skin cancers  Rheumatology - No h/o RA  Pysch - Denies depression, bipolar d/o or schizophrenia    Physical Exam:  BP (!) 141/83 (BP Location: Left arm)   Pulse 100   Temp 98.1  F (36.7  C) (Oral)   Resp 20   Ht  "1.753 m (5' 9\")   Wt (!) 154.4 kg (340 lb 6.2 oz)   SpO2 93%   BMI 50.27 kg/m      Constitutional- Non toxic, not acutely distressed  Eyes: Anicteric, no injection.  PERRL  ENT:  Normocephalic, atraumatic, Nose midline, moist mucus membranes  Respiratory- nonlabored breathing on O2 per NC, good inspiratory effort  Cardiovascular - Heart Rate slightly tachycardic with HR 100BPM on vitals, skin is warm, dry and well perfused   Abdomen - Soft, morbidly obese abdomen. Tenderness to RLQ with palpation. No tenderness to percussion or peritoneal signs.   Neuro - No focal neuro deficits, Alert and oriented x 3  Psych: Appropriate mood and affect  Musculoskeletal: Moves upper and lower extremities.  Skin: Warm, Dry    ROUTINE IP LABS (Last four results)  BMP  Recent Labs   Lab 07/02/25  0621 07/01/25  1540 06/27/25  0847   * 134* 137   POTASSIUM 5.0 5.3 5.0   CHLORIDE 95* 93* 97*   FILI 8.5* 9.3 9.5   CO2 26 28 28   BUN 11.9 14.2 16.4   CR 1.06 1.10 1.02   * 130* 135*     CBC  Recent Labs   Lab 07/02/25  0621 07/01/25  2235 07/01/25  1540 06/27/25  0847   WBC 13.4* 15.9* 13.5* 10.0   RBC 5.14 5.41 5.66 5.68   HGB 16.1 17.0 17.6 17.4   HCT 50.6 52.2 54.5* 54.5*   MCV 98 97 96 96   MCH 31.3 31.4 31.1 30.6   MCHC 31.8 32.6 32.3 31.9   RDW 16.1* 15.9* 16.1* 16.2*   * 125* 140* 137*     INRNo lab results found in last 7 days.     Ct abd/pelvis  IMPRESSION:   1.  Acute appendicitis with a few foci of extraluminal gas along the distal appendix consistent with localized perforation. No fluid collection.  2.  Diffuse hepatic steatosis.     [Critical Result: Perforated viscus]     Finding was identified on 7/1/2025 6:54 PM CDTBk Villagran was contacted by me on 7/1/2025 7:00 PM CDT and verbalized understanding of the critical result.    Assessment:  1. 69 year old male on DOAC due to bilateral PE who presented with acute onset RLQ abdominal pain found to have perforated appendicitis    Plan:   1. Given " patient's presentation on 7/1 and CT findings, recommend appendectomy. Due to patient's eliquis, it was recommend patient be admitted, eliquis held and started on heparin drip.   2. Patient should continue to receive IVF, pain medications and IV antibiotics per medicine team  3. Plan for appendectomy on 7/2 at 1600  4. Discussed with patient who consents to procedure understanding risks, benefits and alternatives: bleeding, infection, hernia, need for additional treatment, nontherapeutic intervention, wound complication (such as dehiscence), conversion to open surgery, changes in bowel habits, and rare complications related to surgery and/or anesthesia such as venous thromboembolism and cardiorespiratory complications.    In general, additionally we recommend avoiding long-term opioid medication for non-cancer pain due to well described risks. Today's visit included an extensive discussion on the risks of chronic opioid therapy, including tolerance, dependence, addiction, hyperalgesia, respiratory depression and death.          MICHEL REYES PA-C  7/2/2025 at 10:38 AM

## 2025-07-02 NOTE — PROGRESS NOTES
"[facility] ADMISSION NOTE    Patient admitted to room 2311 at approximately 2120 via cart from emergency room. Patient was accompanied by transport tech.     Verbal SBAR report received from SANJAY Cristobal prior to patient arrival.     Patient ambulated to bed with one assist. Patient alert and oriented X 3. Pain is controlled with current analgesics.  Medication(s) being used: Fentanyl.  . Admission vital signs: Blood pressure (!) 140/75, pulse 83, temperature 98  F (36.7  C), temperature source Oral, resp. rate 20, height 1.753 m (5' 9\"), weight (!) 154.8 kg (341 lb 4.4 oz), SpO2 93%. Patient was oriented to plan of care, call light, bed controls, tv, telephone, bathroom, and visiting hours.     Risk Assessment    The following safety risks were identified during admission: fall and skin. Yellow risk band applied: YES.     Skin Initial Assessment    This writer admitted this patient and completed a full skin assessment and Nael score in the Adult PCS flowsheet.   Photo documentation of skin problem and/or wound competed via [employer] application (located under Media):  N/A    Appropriate interventions initiated as needed.     Secondary skin check completed by Carolina FREGOSO RN.    Nael Risk Assessment  Sensory Perception: 3-->slightly limited  Moisture: 3-->occasionally moist  Activity: 3-->walks occasionally  Mobility: 3-->slightly limited  Nutrition: 3-->adequate  Friction and Shear: 3-->no apparent problem  Nael Score: 18  Moisture Interventions: Encourage regular toileting  Mattress: Standard gel/foam mattress (IsoFlex, Atmos Air, etc.)  Bed Frame: Standard width and length    Education    Patient has a [place] to Observation order: No  Observation education completed and documented: N/A      Purvi Lopez RN      " Azithromycin Counseling:  I discussed with the patient the risks of azithromycin including but not limited to GI upset, allergic reaction, drug rash, diarrhea, and yeast infections.

## 2025-07-02 NOTE — BRIEF OP NOTE
[facility]    Brief Operative Note    Pre-operative diagnosis: Acute appendicitis with perforation and localized peritonitis, without abscess or gangrene [K35.32]  Chronic anticoagulation [Z79.01]; BMI 50; history of bilateral PE  Post-operative diagnosis Same as pre-operative diagnosis    Procedure: APPENDECTOMY, LAPAROSCOPIC with drain placement, N/A - Abdomen; modifier 22    Surgeon: Surgeons and Role:     * Keshia Pineda MD - Primary     * Trudi Munoz PA-C - Assisting  Anesthesia: General   Estimated Blood Loss: Less than 10 ml    Drains: Elia-Hedrick  Specimens:   ID Type Source Tests Collected by Time Destination   1 : appendix Tissue Appendix SURGICAL PATHOLOGY EXAM Keshia Pineda MD 7/2/2025  5:02 PM      Findings:   Perforated appendicitis with appendicelith; noted contamination as the body was the appendix well adherent to the bottom of the cecum; appendix was completely removed.  Surgicel was used to help with hemostasis and was removed at the end.  An extra port was used at the RUQ due to patient's large BMI.   .  Complications: None.  Implants: * No implants in log *

## 2025-07-02 NOTE — ANESTHESIA PROCEDURE NOTES
Airway       Patient location during procedure: OR       Procedure Start/Stop Times: 7/2/2025 4:16 PM  Staff -        CRNA: Everette Serrano APRN CRNA       Performed By: CRNA  Consent for Airway        Urgency: elective  Indications and Patient Condition       Indications for airway management: javi-procedural       Induction type:intravenous       Mask difficulty assessment: 1 - vent by mask    Final Airway Details       Final airway type: endotracheal airway       Successful airway: ETT - single and Oral  Endotracheal Airway Details        ETT size (mm): 8.0       Cuffed: yes       Successful intubation technique: video laryngoscopy       VL Blade Size: Jarivs 4       Grade View of Cords: 1       Adjucts: stylet       Position: Right       Measured from: lips       Secured at (cm): 23       Bite block used: None    Post intubation assessment        Placement verified by: capnometry, equal breath sounds and chest rise        Number of attempts at approach: 1       Number of other approaches attempted: 0       Secured with: tape       Ease of procedure: easy       Dentition: Intact and Unchanged    Medication(s) Administered   Medication Administration Time: 7/2/2025 4:16 PM

## 2025-07-02 NOTE — H&P
[facility]    History and Physical  Hospital Medicine       Date of Admission:  7/1/2025  Date of Service: 7/1/2025     Assessment & Plan   Miguel Baca is a 69 year old male with past medical history significant for bilateral pulmonary embolism on chronic anticoagulation, HTN, HFpEF, urinary retention who presents on 7/1/2025 with RLQ abdominal pain.     Acute appendicitis with perforation and localized peritonitis, without abscess or gangrene  Presented with progressive RLQ abd pain.   CT abd/pelvis with Acute appendicitis with a few foci of extraluminal gas along the distal appendix consistent with localized perforation. No fluid collection.   WBC 13.5, ANC 11.4. Procalcitonin wnl.   Febrile to 100.9F on ED arrival, VS otherwise reassuring. Received 500mL bolus LR in the ED and started on levofloxacin and metronidazole.   Admitting hospitalist also discussed with Dr. Pineda who supported heparin gtt and advised stopping this 4 hours prior to surgery.   - IV levofloxacin 750mg qdaily   - IV metronidazole 500mg q8hrs   - General surgery consulted   - heparin gtt while holding apixaban; stop heparin 4 hours prior to surgery   - acetaminophen, oxycodone PO, hydromorphone IV     Acute hypoxic respiratory failure   Was not hypoxic on ED arrival. Developed mild hypoxia after receiving 25mcg IV fentanyl, now requiring 2L via NC to maintain SpO2 >90%. No recent illness or respiratory symptoms.   Suspect acute hypoxia secondary to hypoventilation in the setting of sedating medications.   - Continuous pulse oximetry; supplemental O2 prn    Surgery Clearance and RCRI Risk Assessment:   Anesthesia issues: 0  Baseline Activity: 3 METS   Chest Pain: 0  Shortness of breath: 1  Cardiac Risk Factors/Assessment:                High Risk Surgery: 0               History Ischemic Heart Disease: 0               History of Congestive Heart Failure: 1              History of CVA: 1              Preoperative  "Treatment with Insulin: 0              Preoperative Creatinine greater than 2.0: 0              Total Number of Points: 2 = 3.6% risk of major cardiac event (0 = 0.5; 1 = 1.3%; 2 = 3.6%; 3+ = 9.1%)  - EKG pending   - no further pre-operative diagnostics needed     Essential hypertension   Normotensive on admission.   - Continued pta atenolol     Heart failure with preserved ejection fraction   Echocardiogram 10/30/2023 with EF 55-60%, moderate right ventricular chamber enlargement.   BNP 80. No pitting LE edema.   - Repeat echocardiogram ordered   - Hold pta diuretics and aldactone     Hepatic steatosis   CT abd/pelvis with diffuse hepatic steatosis.     Hx bilateral pulmonary embolism (10/2023)  Chronic anticoagulation   Noted on chart review. CT PE study at that time showed pulmonary emboli with multiple bilateral segmental and subsegmental acute appearing pulmonary emboli.   - Holding pta apixaban; heparin drip ordered   - HOLD heparin gtt 4 hours prior to surgery     Urinary retention   Urethral stricture  Follows [facility] last visit 04/09/25.       Clinically Significant Risk Factors Present on Admission         # Hyponatremia: Lowest Na = 134 mmol/L in last 2 days, will monitor as appropriate  # Hypochloremia: Lowest Cl = 93 mmol/L in last 2 days, will monitor as appropriate         # Drug Induced Coagulation Defect: home medication list includes an anticoagulant medication  # Thrombocytopenia: Lowest platelets = 125 in last 2 days, will monitor for bleeding   # Hypertension: Noted on problem list          # DMII: A1C = 7.1 % (Ref range: 0.0 - 5.6 %) within past 6 months    # Morbid Obesity: Estimated body mass index is 50.4 kg/m  as calculated from the following:    Height as of this encounter: 1.753 m (5' 9\").    Weight as of this encounter: 154.8 kg (341 lb 4.4 oz).                   Diet: NPO for Procedure/Surgery per Anesthesia Guidelines Except for: Meds; Clear liquids before procedure/surgery: " ADULT (Age GREATER than or Equal to 18 years) - Clear liquids 2 hours before procedure/surgery    DVT Prophylaxis: heparin gtt  Erickson Catheter: Not present  Code Status: Full Code      Disposition Plan   Medically Ready for Discharge: Anticipated in 2-4 Days       The patient's care was discussed with the Patient.  I have discussed patient and formulated plan with attending hospitalist physician, Dr. Demetris Duncan PA-C        Primary Care Physician   Yahaira Fernandez [phone]    History is obtained from the patient,  and review of old records via the EMR.    History of Present Illness   Miguel Baca is a 69 year old male with past medical history significant for bilateral pulmonary embolism on chronic anticoagulation, HTN, HFpEF, urinary retention who presents on 7/1/2025 with RLQ abdominal pain.     Malachi had acute onset right lower quadrant abdominal pain upon waking on morning of admission. He describes a deep, stabbing pain in the right lower quadrant that does not radiate. The pain has been progressive throughout the day.   Endorses increased fatigue one day pta, no other concerning symptoms prior to day of admission.   He denies fevers, chills, diaphoresis, nausea, vomiting, change in bowel movements, melena, hematochezia, diarrhea, dysuria.   Denies changes in diet, last intake was an apple on day of admission.     [place] Endorses good medication compliance. Denies tobacco use, illicit drug use. Drinks alcohol 2-3 times weekly, estimates 1-6 drinks on any night.       Review of Systems   The 10 point Review of Systems is negative other than noted in the HPI or here.     Past Medical History    Past Medical History:   Diagnosis Date    Acute chest pain 12/22/2015    Acute combined systolic and diastolic congestive heart failure (H)     Cellulitis and abscess of unspecified site     Created by Conversion     Colon polyps     Cough     Created by  Conversion     Hypertension     Morbid obesity (H)     Primary osteoarthritis of both knees     Pulmonary embolism (H)     Skin cancer     Thrombocytopenia     Urethral stricture        Past Surgical History   Past Surgical History:   Procedure Laterality Date    ARTHROSCOPY KNEE Left 2012    CYSTOSCOPY, DILATE URETHRA, COMBINED N/A 9/10/2024    Procedure: CYSTOSCOPY, URETHRAL DILATION;  Surgeon: Felix Lloyd MD;  Location: [facility] [place] OR    RETROGRADE URETHROGRAM N/A 9/10/2024    Procedure: RETROGRADE URETHROGRAM, OPTILUME BALLOON URETHRAL DILATION;  Surgeon: Felix Lloyd MD;  Location: [facility] [place] OR        Prior to Admission Medications   Prior to Admission Medications   Prescriptions Last Dose Informant Patient Reported? Taking?   apixaban ANTICOAGULANT (ELIQUIS ANTICOAGULANT) 5 MG tablet 7/1/2025 Morning Self No Yes   Sig: Take 1 tablet (5 mg) by mouth 2 times daily.   atenolol (TENORMIN) 25 MG tablet 7/1/2025 Morning Self No Yes   Sig: Take 1 tablet (25 mg) by mouth daily.   bumetanide (BUMEX) 1 MG tablet 7/1/2025 Morning Self No Yes   Sig: Take 1 tablet (1 mg) by mouth 2 times daily.   metFORMIN (GLUCOPHAGE XR) 500 MG 24 hr tablet 6/30/2025 Evening Self No Yes   Sig: Take 1 tablet (500 mg) by mouth daily (with dinner).   spironolactone (ALDACTONE) 25 MG tablet 7/1/2025 Morning Self No Yes   Sig: Take 2 tablets (50 mg) by mouth every morning.   tiZANidine (ZANAFLEX) 4 MG tablet 6/30/2025 Bedtime Self No Yes   Sig: Take 1 tablet (4 mg) by mouth nightly as needed for muscle spasms.      Facility-Administered Medications: None     Allergies   Allergies   Allergen Reactions    Ceftriaxone Anaphylaxis       Family History    Family History   Problem Relation Age of Onset    Coronary Artery Disease Father         s/p stents, CABG    Coronary Artery Disease Brother         s/p stents    Cancer Mother     Parkinsonism Paternal Grandmother     Diabetes Type 2  Paternal Grandmother        Social  History   Social History     Socioeconomic History    Marital status: Single     Spouse name: Not on file    Number of children: Not on file    Years of education: Not on file    Highest education level: Not on file   Occupational History    Not on file   Tobacco Use    Smoking status: Former     Types: Cigarettes    Smokeless tobacco: Never   Vaping Use    Vaping status: Never Used   Substance and Sexual Activity    Alcohol use: Not on file    Drug use: Not on file    Sexual activity: Not on file   Other Topics Concern    Not on file   Social History Narrative    Lives independently near [facility], [place] Works in [place] so rents a room from a karlene during the work week     Social Drivers of Health     Financial Resource Strain: Low Risk  (7/1/2025)    Financial Resource Strain     Within the past 12 months, have you or your family members you live with been unable to get utilities (heat, electricity) when it was really needed?: No   Food Insecurity: Low Risk  (7/1/2025)    Food Insecurity     Within the past 12 months, did you worry that your food would run out before you got money to buy more?: No     Within the past 12 months, did the food you bought just not last and you didn t have money to get more?: No   Transportation Needs: Low Risk  (7/1/2025)    Transportation Needs     Within the past 12 months, has lack of transportation kept you from medical appointments, getting your medicines, non-medical meetings or appointments, work, or from getting things that you need?: No   Physical Activity: Not on file   Stress: Not on file   Social Connections: Socially Integrated (10/30/2023)    [facility]    Social Connections     Frequency of Communication with Friends and Family: 0   Interpersonal Safety: Low Risk  (7/1/2025)    Interpersonal Safety     Do you feel physically and emotionally safe where you currently live?: Yes     Within the past 12 months,  "have you been hit, slapped, kicked or otherwise physically hurt by someone?: No     Within the past 12 months, have you been humiliated or emotionally abused in other ways by your partner or ex-partner?: No   Housing Stability: Low Risk  (7/1/2025)    Housing Stability     Do you have housing? : Yes     Are you worried about losing your housing?: No       Physical Exam   BP (!) 146/80 (BP Location: Left arm)   Pulse 89   Temp 98.1  F (36.7  C) (Oral)   Resp 18   Ht 1.753 m (5' 9\")   Wt (!) 154.8 kg (341 lb 4.4 oz)   SpO2 94%   BMI 50.40 kg/m       Weight: 341 lbs 4.35 oz Body mass index is 50.4 kg/m .     Constitutional: Morbidly obese. Pleasant, responding to questions appropriately, speaking in full sentences without difficulty. Alert and oriented, appears comfortable and no acute distress. No diaphoresis. Mild flushing of face and neck.   Eyes: Eyes are clear, no scleral icterus, pupils are reactive, EOM intact bilaterally.  HENT: Oropharynx is clear and moist. No evidence of cranial trauma.   Cardiovascular: Regular rate and rhythm and no murmur, rub, or gallops noted. Radial & dorsalis pedis pulses 2+ bilaterally. No pitting lower extremity edema.  Respiratory: Respirations unlabored while at rest on 2L via NC. Increased work of breathing with minimal exertion such as sitting up in bed. Clear to auscultation bilaterally without wheezes, crackles, rhonchi.   GI: Active bowel sounds throughout. Abdomen is distended, tight. Tenderness to palpation over right lower quadrant only. Negative psoas sign. Negative rovsings sign. No rebound tenderness.   Musculoskeletal: Normal muscle bulk and tone. Moves all extremities spontaneously. No gross deformity.  Skin: Warm and dry, no rashes on exposed skin.   Neurologic: Neck supple.  is symmetric. No notable tremor. Speech is clear and fluent.       Data   Data reviewed today:     I have personally reviewed the following data over the past 24 hrs:    15.9 (H)  \   " 17.0   / 125 (L)     134 (L) 93 (L) 14.2 /  130 (H)   5.3 28 1.10 \     ALT: 25 AST: 27 AP: 104 TBILI: 1.3 (H)   ALB: 4.2 TOT PROTEIN: 8.1 LIPASE: N/A     Trop: N/A BNP: 80     Procal: 0.17 CRP: N/A Lactic Acid: N/A           Recent Results (from the past 24 hours)   CT Abdomen Pelvis w Contrast   Result Value    Radiologist flags Perforated viscus (AA)    Narrative    EXAM: CT ABDOMEN PELVIS W CONTRAST  LOCATION: [facility]  DATE: 7/1/2025    INDICATION: Acute right lower quadrant abdominal pain.  Anticoagulated   Hx of VTE (Bilateral PE). Large BMI.  Eval for acute diverticulitis and appendicitis or obstructing urinary stone vs other acute catastrophe  COMPARISON: 11/3/2023  TECHNIQUE: CT scan of the abdomen and pelvis was performed following injection of IV contrast. Multiplanar reformats were obtained. Dose reduction techniques were used.  CONTRAST: 149 ml Isovue 370    FINDINGS:   LOWER CHEST: Normal.    HEPATOBILIARY: Diffuse hepatic steatosis. No significant mass. No bile duct dilatation. No calcified gallstones.    PANCREAS: Normal.    SPLEEN: Normal.    ADRENAL GLANDS: Normal.    KIDNEYS/BLADDER: No significant mass, stone, or hydronephrosis. Bladder is underdistended with circumferential wall thickening.    BOWEL: Dilated appendix up to 1.2 cm with wall thickening and periappendiceal fat stranding. There are a few foci of extraluminal gas adjacent to the distal appendix (e.g. 3/56). No calcified appendicolith or fluid collection.    LYMPH NODES: Normal.    VASCULATURE: No abdominal aortic aneurysm.    PELVIC ORGANS: Normal.    MUSCULOSKELETAL: Tiny fat-containing paraumbilical hernia. Bilateral gynecomastia.      Impression    IMPRESSION:   1.  Acute appendicitis with a few foci of extraluminal gas along the distal appendix consistent with localized perforation. No fluid collection.  2.  Diffuse hepatic steatosis.    [Critical Result: Perforated viscus]    Finding was identified  on 7/1/2025 6:54 PM CDT.     Corey Villagran was contacted by me on 7/1/2025 7:00 PM CDT and verbalized understanding of the critical result.       I personally reviewed no images or EKG's today

## 2025-07-03 VITALS
WEIGHT: 315 LBS | DIASTOLIC BLOOD PRESSURE: 72 MMHG | TEMPERATURE: 97.7 F | BODY MASS INDEX: 46.65 KG/M2 | HEART RATE: 56 BPM | RESPIRATION RATE: 18 BRPM | OXYGEN SATURATION: 93 % | HEIGHT: 69 IN | SYSTOLIC BLOOD PRESSURE: 121 MMHG

## 2025-07-03 LAB
ANION GAP SERPL CALCULATED.3IONS-SCNC: 10 MMOL/L (ref 7–15)
APTT PPP: 37 SECONDS (ref 22–38)
ATRIAL RATE - MUSE: 85 BPM
BASE EXCESS BLDV CALC-SCNC: 3.5 MMOL/L (ref -3–3)
BASOPHILS # BLD AUTO: 0 10E3/UL (ref 0–0.2)
BASOPHILS NFR BLD AUTO: 0 %
BUN SERPL-MCNC: 13.2 MG/DL (ref 8–23)
CALCIUM SERPL-MCNC: 8.8 MG/DL (ref 8.8–10.4)
CHLORIDE SERPL-SCNC: 98 MMOL/L (ref 98–107)
CREAT SERPL-MCNC: 0.83 MG/DL (ref 0.67–1.17)
CRP SERPL-MCNC: 318.96 MG/L
DIASTOLIC BLOOD PRESSURE - MUSE: NORMAL MMHG
EGFRCR SERPLBLD CKD-EPI 2021: >90 ML/MIN/1.73M2
EOSINOPHIL # BLD AUTO: 0 10E3/UL (ref 0–0.7)
EOSINOPHIL NFR BLD AUTO: 0 %
ERYTHROCYTE [DISTWIDTH] IN BLOOD BY AUTOMATED COUNT: 16 % (ref 10–15)
GLUCOSE SERPL-MCNC: 142 MG/DL (ref 70–99)
HCO3 BLDV-SCNC: 32 MMOL/L (ref 21–28)
HCO3 SERPL-SCNC: 26 MMOL/L (ref 22–29)
HCT VFR BLD AUTO: 48.3 % (ref 40–53)
HGB BLD-MCNC: 15.1 G/DL (ref 13.3–17.7)
HOLD SPECIMEN: NORMAL
IMM GRANULOCYTES # BLD: 0.2 10E3/UL
IMM GRANULOCYTES NFR BLD: 1 %
INTERPRETATION ECG - MUSE: NORMAL
LYMPHOCYTES # BLD AUTO: 1.1 10E3/UL (ref 0.8–5.3)
LYMPHOCYTES NFR BLD AUTO: 7 %
MAGNESIUM SERPL-MCNC: 2.2 MG/DL (ref 1.7–2.3)
MCH RBC QN AUTO: 31.2 PG (ref 26.5–33)
MCHC RBC AUTO-ENTMCNC: 31.3 G/DL (ref 31.5–36.5)
MCV RBC AUTO: 100 FL (ref 78–100)
MONOCYTES # BLD AUTO: 0.7 10E3/UL (ref 0–1.3)
MONOCYTES NFR BLD AUTO: 5 %
NEUTROPHILS # BLD AUTO: 12.4 10E3/UL (ref 1.6–8.3)
NEUTROPHILS NFR BLD AUTO: 86 %
NRBC # BLD AUTO: 0 10E3/UL
NRBC BLD AUTO-RTO: 0 /100
O2/TOTAL GAS SETTING VFR VENT: 0 %
OXYHGB MFR BLDV: 82 % (ref 70–75)
P AXIS - MUSE: 38 DEGREES
PCO2 BLDV: 62 MM HG (ref 40–50)
PH BLDV: 7.32 [PH] (ref 7.32–7.43)
PHOSPHATE SERPL-MCNC: 2.8 MG/DL (ref 2.5–4.5)
PLATELET # BLD AUTO: 119 10E3/UL (ref 150–450)
PO2 BLDV: 49 MM HG (ref 25–47)
POTASSIUM SERPL-SCNC: 4.9 MMOL/L (ref 3.4–5.3)
PR INTERVAL - MUSE: 162 MS
QRS DURATION - MUSE: 80 MS
QT - MUSE: 372 MS
QTC - MUSE: 442 MS
R AXIS - MUSE: 26 DEGREES
RBC # BLD AUTO: 4.84 10E6/UL (ref 4.4–5.9)
SAO2 % BLDV: 84.4 % (ref 70–75)
SODIUM SERPL-SCNC: 134 MMOL/L (ref 135–145)
SYSTOLIC BLOOD PRESSURE - MUSE: NORMAL MMHG
T AXIS - MUSE: 45 DEGREES
VENTRICULAR RATE- MUSE: 85 BPM
WBC # BLD AUTO: 14.4 10E3/UL (ref 4–11)

## 2025-07-03 PROCEDURE — 84100 ASSAY OF PHOSPHORUS: CPT | Performed by: PHYSICIAN ASSISTANT

## 2025-07-03 PROCEDURE — 250N000011 HC RX IP 250 OP 636: Performed by: PHYSICIAN ASSISTANT

## 2025-07-03 PROCEDURE — 80048 BASIC METABOLIC PNL TOTAL CA: CPT | Performed by: PHYSICIAN ASSISTANT

## 2025-07-03 PROCEDURE — 86140 C-REACTIVE PROTEIN: CPT | Performed by: STUDENT IN AN ORGANIZED HEALTH CARE EDUCATION/TRAINING PROGRAM

## 2025-07-03 PROCEDURE — 250N000013 HC RX MED GY IP 250 OP 250 PS 637

## 2025-07-03 PROCEDURE — 85730 THROMBOPLASTIN TIME PARTIAL: CPT | Performed by: STUDENT IN AN ORGANIZED HEALTH CARE EDUCATION/TRAINING PROGRAM

## 2025-07-03 PROCEDURE — 85004 AUTOMATED DIFF WBC COUNT: CPT | Performed by: PHYSICIAN ASSISTANT

## 2025-07-03 PROCEDURE — 99233 SBSQ HOSP IP/OBS HIGH 50: CPT | Performed by: STUDENT IN AN ORGANIZED HEALTH CARE EDUCATION/TRAINING PROGRAM

## 2025-07-03 PROCEDURE — 250N000013 HC RX MED GY IP 250 OP 250 PS 637: Performed by: STUDENT IN AN ORGANIZED HEALTH CARE EDUCATION/TRAINING PROGRAM

## 2025-07-03 PROCEDURE — 250N000013 HC RX MED GY IP 250 OP 250 PS 637: Performed by: PHYSICIAN ASSISTANT

## 2025-07-03 PROCEDURE — 250N000011 HC RX IP 250 OP 636

## 2025-07-03 PROCEDURE — 36415 COLL VENOUS BLD VENIPUNCTURE: CPT | Performed by: STUDENT IN AN ORGANIZED HEALTH CARE EDUCATION/TRAINING PROGRAM

## 2025-07-03 PROCEDURE — 82805 BLOOD GASES W/O2 SATURATION: CPT | Performed by: STUDENT IN AN ORGANIZED HEALTH CARE EDUCATION/TRAINING PROGRAM

## 2025-07-03 PROCEDURE — 120N000004 HC R&B MS OVERFLOW

## 2025-07-03 PROCEDURE — 258N000003 HC RX IP 258 OP 636

## 2025-07-03 PROCEDURE — 36415 COLL VENOUS BLD VENIPUNCTURE: CPT | Performed by: PHYSICIAN ASSISTANT

## 2025-07-03 PROCEDURE — 83735 ASSAY OF MAGNESIUM: CPT | Performed by: PHYSICIAN ASSISTANT

## 2025-07-03 RX ORDER — HYDROMORPHONE HYDROCHLORIDE 1 MG/ML
0.3 INJECTION, SOLUTION INTRAMUSCULAR; INTRAVENOUS; SUBCUTANEOUS
Status: ACTIVE | OUTPATIENT
Start: 2025-07-03

## 2025-07-03 RX ORDER — HYDROMORPHONE HYDROCHLORIDE 1 MG/ML
0.5 INJECTION, SOLUTION INTRAMUSCULAR; INTRAVENOUS; SUBCUTANEOUS
Status: ACTIVE | OUTPATIENT
Start: 2025-07-03

## 2025-07-03 RX ADMIN — SENNOSIDES AND DOCUSATE SODIUM 1 TABLET: 50; 8.6 TABLET ORAL at 08:39

## 2025-07-03 RX ADMIN — ACETAMINOPHEN 975 MG: 325 TABLET ORAL at 11:11

## 2025-07-03 RX ADMIN — OXYCODONE HYDROCHLORIDE 10 MG: 5 TABLET ORAL at 06:52

## 2025-07-03 RX ADMIN — OXYCODONE HYDROCHLORIDE 10 MG: 5 TABLET ORAL at 16:43

## 2025-07-03 RX ADMIN — ATENOLOL 25 MG: 25 TABLET ORAL at 08:38

## 2025-07-03 RX ADMIN — METRONIDAZOLE 500 MG: 500 INJECTION, SOLUTION INTRAVENOUS at 02:57

## 2025-07-03 RX ADMIN — LEVOFLOXACIN 750 MG: 5 INJECTION, SOLUTION INTRAVENOUS at 19:57

## 2025-07-03 RX ADMIN — METRONIDAZOLE 500 MG: 500 INJECTION, SOLUTION INTRAVENOUS at 11:11

## 2025-07-03 RX ADMIN — OXYCODONE HYDROCHLORIDE 5 MG: 5 TABLET ORAL at 03:03

## 2025-07-03 RX ADMIN — SODIUM CHLORIDE, POTASSIUM CHLORIDE, SODIUM LACTATE AND CALCIUM CHLORIDE 1000 ML: 600; 310; 30; 20 INJECTION, SOLUTION INTRAVENOUS at 03:13

## 2025-07-03 RX ADMIN — HYDROMORPHONE HYDROCHLORIDE 0.4 MG: 0.2 INJECTION, SOLUTION INTRAMUSCULAR; INTRAVENOUS; SUBCUTANEOUS at 06:50

## 2025-07-03 RX ADMIN — METRONIDAZOLE 500 MG: 500 INJECTION, SOLUTION INTRAVENOUS at 18:17

## 2025-07-03 RX ADMIN — ACETAMINOPHEN 975 MG: 325 TABLET ORAL at 03:00

## 2025-07-03 RX ADMIN — ACETAMINOPHEN 975 MG: 325 TABLET ORAL at 20:02

## 2025-07-03 RX ADMIN — SENNOSIDES AND DOCUSATE SODIUM 1 TABLET: 50; 8.6 TABLET ORAL at 20:02

## 2025-07-03 RX ADMIN — POLYETHYLENE GLYCOL 3350 17 G: 17 POWDER, FOR SOLUTION ORAL at 08:39

## 2025-07-03 RX ADMIN — BUMETANIDE 1 MG: 1 TABLET ORAL at 20:02

## 2025-07-03 ASSESSMENT — ACTIVITIES OF DAILY LIVING (ADL)
ADLS_ACUITY_SCORE: 25
ADLS_ACUITY_SCORE: 25
ADLS_ACUITY_SCORE: 24
ADLS_ACUITY_SCORE: 24
ADLS_ACUITY_SCORE: 23
ADLS_ACUITY_SCORE: 23
ADLS_ACUITY_SCORE: 25
ADLS_ACUITY_SCORE: 24
ADLS_ACUITY_SCORE: 25
ADLS_ACUITY_SCORE: 23
ADLS_ACUITY_SCORE: 23
ADLS_ACUITY_SCORE: 24
ADLS_ACUITY_SCORE: 24
ADLS_ACUITY_SCORE: 23
ADLS_ACUITY_SCORE: 23
ADLS_ACUITY_SCORE: 24
ADLS_ACUITY_SCORE: 25

## 2025-07-03 NOTE — PROGRESS NOTES
[facility]    Medicine Progress Note - Hospitalist Service    Date of Admission:  7/1/2025    Assessment & Plan   Miguel Baca is a 69 year old male with past medical history significant for bilateral pulmonary embolism on chronic anticoagulation, HTN, HFpEF, urinary retention who presents on 7/1/2025 with RLQ abdominal pain.     Update: Heparin gtt resumed this AM 07/03 (POD#1), if clinically stable, can likely transition back to DOAC 07/04. Continue IV abx. Drain in place. Pain relatively controlled (patient states its controlled with medications but rates his pain as 10). OHS/JOSE ELIAS undx + sedation likely caused hypercapnia, patient states he doesn't want a sleep study and has had them ordered. Likely downgrade IMC later today as shouldn't need BIPAP anymore now that sedation has worn off.    Acute appendicitis with perforation and localized peritonitis, without abscess or gangrene s/p lap appendectomy 07/02 (POD#1) with drain  Presented with progressive RLQ abd pain.   CT abd/pelvis with Acute appendicitis with a few foci of extraluminal gas along the distal appendix consistent with localized perforation. No fluid collection.   WBC 13.5, ANC 11.4. Procalcitonin wnl.   Febrile to 100.9F on ED arrival, VS otherwise reassuring. Received 500mL bolus LR in the ED and started on levofloxacin and metronidazole.   Admitting hospitalist also discussed with Dr. Pineda who supported heparin gtt and advised stopping this 4 hours prior to surgery.   07/03: POD1, CRP lagging but expect decline by next day or two, continue IV abx. Pain controlled.    - Continue IV levofloxacin 750mg qdaily and IV metronidazole 500mg q8hrs for next 24-48 hours, pending course consider transition to orals  - General surgery following; appreciate management of drain  - Diet: ADAT  - Bowel: Miralax daily, sennokot BID schd (hold for loose stools)  - heparin gtt resumed 07/03, if blood counts and clinical picture stable,  consider resuming DOAC 07/04  - acetaminophen, oxycodone PO, hydromorphone IV     Acute hypoxic and hypercapnic respiratory failure   Suspected Obstructive Sleep Apnea and Obesity-Hypoventilation Syndrome  Was not hypoxic on ED arrival. Developed mild hypoxia after receiving 25mcg IV fentanyl, now requiring 2L via NC to maintain SpO2 >90%. No recent illness or respiratory symptoms.   Suspect acute hypoxia secondary to hypoventilation in the setting of sedating medications.   07/02: Patient was hypercapnic post-op and transferred [place] [facility] [place]IP[place]. VBG improved.  Patient endorses having sleep studies ordered but not completing it    - Recommend patient to complete sleep study as previously ordered  - Incentive spirometer, if still requiring O2 07/04 consider CXR  - Continuous pulse oximetry; supplemental O2 prn    Essential hypertension   Normotensive on admission.   - Continued pta atenolol     Heart failure with preserved ejection fraction   Echocardiogram 10/30/2023 with EF 55-60%, moderate right ventricular chamber enlargement.   BNP 80. No pitting LE edema.   Repeat TTE 55-60%, grade I diastolic dysfxn    - resume 1mg bumex BID in PM 07/03 (Hold if SBP <120)  - hold aldactone, consider resuming 07/04 pending course/vitals    Hepatic steatosis   CT abd/pelvis with diffuse hepatic steatosis.     Hx bilateral pulmonary embolism (10/2023)  Chronic anticoagulation   Noted on chart review. CT PE study at that time showed pulmonary emboli with multiple bilateral segmental and subsegmental acute appearing pulmonary emboli.   07/02: Heparin gtt paused 4 hours after surgery. Per surgeon, hold until 07/03 but can resume    - resume heparin gtt 07/03, if stable over next 24 hours can likely resume doac 07/04  - continue holding doac per above    Urinary retention   Urethral stricture  Follows [facility] last visit 04/09/25.     Class III Obesity  BMI >50    Contiributes to M&M, likely cause of hypercapnia and  "hypoxia          Diet: Advance Diet as Tolerated: Regular Diet Adult    DVT Prophylaxis: heparin gtt  Erickson Catheter: Not present  Lines: None     Cardiac Monitoring: None  Code Status: Full Code      Clinically Significant Risk Factors         # Hyponatremia: Lowest Na = 132 mmol/L in last 2 days, will monitor as appropriate  # Hypochloremia: Lowest Cl = 93 mmol/L in last 2 days, will monitor as appropriate  # Hypocalcemia: Lowest Ca = 8.5 mg/dL in last 2 days, will monitor and replace as appropriate       # Thrombocytopenia: Lowest platelets = 119 in last 2 days, will monitor for bleeding   # Hypertension: Noted on problem list    # Chronic heart failure with preserved ejection fraction: heart failure noted on problem list and last echo with EF >50%    # Acute Hypercapnic Respiratory Failure: based on venous blood gas results.  Continue supplemental oxygen and ventilatory support as indicated.        # DMII: A1C = 7.1 % (Ref range: 0.0 - 5.6 %) within past 6 months, PRESENT ON ADMISSION  # Morbid Obesity: Estimated body mass index is 51.44 kg/m  as calculated from the following:    Height as of this encounter: 1.753 m (5' 9\").    Weight as of this encounter: 158 kg (348 lb 5.2 oz)., PRESENT ON ADMISSION            Social Drivers of Health    Tobacco Use: Medium Risk (6/30/2025)    Patient History     Smoking Tobacco Use: Former     Smokeless Tobacco Use: Never          Disposition Plan     Medically Ready for Discharge: Anticipated in 2-4 Days  Can likely downgrade from [facility] 07/03 afternoon. 1-2 more days pending course.           Demetris Collier DO  Hospitalist Service  [facility]  Securely message with [employer] (more info)  Text page via [employer] Paging/Directory   ______________________________________________________________________    Interval History   No acute events since getting to [facility].    Physical Exam   Vital Signs: Temp: 97.9  F (36.6  C) Temp src: Oral BP: 117/72 Pulse: 69   Resp: 20 " SpO2: (!) 91 % O2 Device: Nasal cannula Oxygen Delivery: 5 LPM  Weight: 348 lbs 5.23 oz    Physical Exam  Constitutional:       Appearance: He is obese.   Eyes:      General: No scleral icterus.  Cardiovascular:      Rate and Rhythm: Normal rate and regular rhythm.      Heart sounds: No murmur heard.  Pulmonary:      Breath sounds: No wheezing, rhonchi or rales.   Abdominal:      Tenderness: There is abdominal tenderness (RLQ). There is no guarding or rebound.   Musculoskeletal:      Right lower leg: Edema (chronic venous insufficiency) present.      Left lower leg: Edema (chronic venous insufficiency) present.   Neurological:      General: No focal deficit present.           Medical Decision Making       50 MINUTES SPENT BY ME on the date of service doing chart review, history, exam, documentation & further activities per the note.      Data     I have personally reviewed the following data over the past 24 hrs:    14.4 (H)  \   15.1   / 119 (L)     134 (L) 98 13.2 /  142 (H)   4.9 26 0.83 \     Procal: N/A CRP: 318.96 (H) Lactic Acid: N/A       INR:  N/A PTT:  33   D-dimer:  N/A Fibrinogen:  N/A       Imaging results reviewed over the past 24 hrs:   No results found for this or any previous visit (from the past 24 hours).

## 2025-07-03 NOTE — PROGRESS NOTES
"GENERAL SURGERY Progress Note    Admission Date: 2025  7/3/2025         Assessment and Plan:     Miguel Baca is a 69 year old male S/P Procedure(s):  APPENDECTOMY, LAPAROSCOPIC with drain placement, 1 Day Post-Op.    - Advance diet as tolerated  - Pain: medications as needed  - On abx: continue IV levaquin and flagyl   - Drain output is appropriate. Okay to resume heparin drip. Anticoagulation per medicine team   - Labs ok: WBC slightly increased, but appropriate given post operative setting. Continue to monitor.   - Pathology pending  - Encourage ambulation and IS.              Interval History:     On bipap overnight, now on 5L per NC. Doing better. Denies pain. Appears comfortable without increased work of breathing but is low on his saturations. Passing flatus.                      Physical Exam:   Blood pressure 117/72, pulse 69, temperature 97.9  F (36.6  C), temperature source Oral, resp. rate 20, height 1.753 m (5' 9\"), weight (!) 158 kg (348 lb 5.2 oz), SpO2 (!) 91%.  Temperature Temp  Av.8  F (37.1  C)  Min: 97.9  F (36.6  C)  Max: 100.2  F (37.9  C)   I/O last 3 completed shifts:  In: 2715 [P.O.:240; I.V.:2125; IV Piggyback:350]  Out: 115 [Drains:115]    Constitutional- not acutely distressed.   Eyes: Anicteric, no injection.  PERRL  ENT:  Normocephalic, atraumatic  Respiratory- nonlabored breathing on 5L per NC, O2 saturations 90-91%  Cardiovascular - Heart rate is normal   Abdomen - Soft, nontender, nondistended morbidly obese abdomen. FRANSISCO drain with scant amount of serosanguinous drainage.   Neuro - No focal neuro deficits, Alert and oriented x 3  Psych: Appropriate mood and affect  Musculoskeletal: Moves upper and lower extremities.  Skin: Warm, Dry         Data:     Recent Labs   Lab Test 25  0616 25  0621 25  2235   WBC 14.4* 13.4* 15.9*   HGB 15.1 16.1 17.0   HCT 48.3 50.6 52.2   * 121* 125*      Recent Labs   Lab Test 25  0616 25  0621 25  1540 "   * 132* 134*   POTASSIUM 4.9 5.0 5.3   CHLORIDE 98 95* 93*   CO2 26 26 28   BUN 13.2 11.9 14.2   CR 0.83 1.06 1.10     Recent Labs   Lab Test 07/02/25  0621 07/01/25  1540 06/27/25  0847   BILITOTAL 1.3* 1.3* 0.7   ALT 18 25 29   AST 28 27 28   ALKPHOS 94 104 88      Recent Labs   Lab Test 07/02/25  1329 07/02/25  0621   PTT 33 37     Recent Labs   Lab Test 07/03/25  0616 07/02/25  0621 07/01/25  1540   FILI 8.8 8.5* 9.3   PHOS 2.8  --   --    MAG 2.2  --   --      Recent Labs   Lab Test 07/03/25  0616 07/02/25  0621 07/01/25  1756 07/01/25  1540 06/27/25  0847   ANIONGAP 10 11  --  13 12   PROTEIN  --   --  30*  --   --    ALBUMIN  --  3.5  --  4.2 4.1       MICHEL REYES PA-C  07/03/25

## 2025-07-03 NOTE — PROGRESS NOTES
"[place] [facility] ADMISSION NOTE    Patient admitted to room 3 at approximately 1919 via cart from [facility]. Patient was accompanied by nurse.     Verbal SBAR report received from Na/Dillan prior to patient arrival.     Patient trasferred to bed via vargas. Patient alert and oriented X 2. Pain is controlled with current analgesics.  Medication(s) being used: pacu meds  .  . Admission vital signs: Blood pressure 128/78, pulse 82, temperature 99.1  F (37.3  C), temperature source Axillary, resp. rate 18, height 1.753 m (5' 9\"), weight (!) 156.9 kg (345 lb 14.4 oz), SpO2 94%. Patient was oriented to plan of care, call light, bed controls, tv, telephone, bathroom, and visiting hours.     Risk Assessment    The following safety risks were identified during admission: fall. Yellow risk band applied: YES.     Skin Initial Assessment    This writer admitted this patient and completed a full skin assessment and Nael score in the Adult PCS flowsheet.   Photo documentation of skin problem and/or wound competed via [employer] application (located under Media):  N/A    Appropriate interventions initiated as needed.     Secondary skin check completed by Mary GRACE.    Nael Risk Assessment  Sensory Perception: 3-->slightly limited  Moisture: 3-->occasionally moist  Activity: 3-->walks occasionally  Mobility: 3-->slightly limited  Nutrition: 3-->adequate  Friction and Shear: 3-->no apparent problem  Nael Score: 18  Moisture Interventions: Encourage regular toileting  Mattress: Standard gel/foam mattress (IsoFlex, Atmos Air, etc.)  Bed Frame: Standard width and length    Education        Jacqueline Souza, RN      "

## 2025-07-03 NOTE — PROGRESS NOTES
Continues on BiPAP 14/8 (60%) ST-14.Continued desats when off PAP. BS clear-diminished. Prefers FFM- Liquicell applied to bridge of nose. RT to follow.

## 2025-07-03 NOTE — PLAN OF CARE
"Goal Outcome Evaluation:  Pt tolerated the BIPAP mask all night, though he stated \"I don't like it\".       Needed 4-5L NC with bipap off for medications and water. Pt complied with cough and deep breathing when asked . Educated on supporting abdominal muscles with coughing, moving. Pt states the ice pack help some with pain. He did get out of bed and walk to the BR, heavy assist 2 to sit to edge of bed , heavy assist 2 to stand but once standing pt is steady and strong to walk using walker.                    "

## 2025-07-03 NOTE — PLAN OF CARE
End Of Shift Note    Situation: Appendicitis with perforation    Plan: Lap Appe, IV heparin, IV antibiotics    Subjective/Objective:    Neuro: pt alert and oriented throughout shift.    Cardiac: VSS.     Resp: lung sounds clear, oxygen decreased to 2L NC. Encouraged deep breathing, given IS and encouraged use.    GI/: pt up to bathroom for urination. No BM this shift. FRANSISCO drain to abdomen, 50 mL of pink fluid removed.    MSK: pt strong in extremities. Up with gait belt, walker, and SBA.    Skin: skin dry and intact. 4 Lap sites dry and intact.    LDAs: PIV x2.     Goal Outcome Evaluation:

## 2025-07-03 NOTE — PROGRESS NOTES
Skin affirmation note    Admitting nurse completed full skin assessment, Nael score and Nael interventions. This writer agrees with the initial skin assessment findings.

## 2025-07-04 ENCOUNTER — APPOINTMENT (OUTPATIENT)
Dept: GENERAL RADIOLOGY | Facility: CLINIC | Age: 70
DRG: 397 | End: 2025-07-04
Attending: INTERNAL MEDICINE
Payer: MEDICARE

## 2025-07-04 LAB
ALBUMIN SERPL BCG-MCNC: 2.9 G/DL (ref 3.5–5.2)
ALP SERPL-CCNC: 67 U/L (ref 40–150)
ALT SERPL W P-5'-P-CCNC: 12 U/L (ref 0–70)
ANION GAP SERPL CALCULATED.3IONS-SCNC: 10 MMOL/L (ref 7–15)
APTT PPP: 39 SECONDS (ref 22–38)
APTT PPP: 45 SECONDS (ref 22–38)
AST SERPL W P-5'-P-CCNC: 22 U/L (ref 0–45)
BILIRUB SERPL-MCNC: 0.3 MG/DL
BUN SERPL-MCNC: 19.2 MG/DL (ref 8–23)
CALCIUM SERPL-MCNC: 8.3 MG/DL (ref 8.8–10.4)
CHLORIDE SERPL-SCNC: 101 MMOL/L (ref 98–107)
CREAT SERPL-MCNC: 0.96 MG/DL (ref 0.67–1.17)
CRP SERPL-MCNC: 195.54 MG/L
EGFRCR SERPLBLD CKD-EPI 2021: 85 ML/MIN/1.73M2
ERYTHROCYTE [DISTWIDTH] IN BLOOD BY AUTOMATED COUNT: 16 % (ref 10–15)
GLUCOSE SERPL-MCNC: 126 MG/DL (ref 70–99)
HCO3 SERPL-SCNC: 26 MMOL/L (ref 22–29)
HCT VFR BLD AUTO: 47.2 % (ref 40–53)
HGB BLD-MCNC: 14.9 G/DL (ref 13.3–17.7)
MCH RBC QN AUTO: 31.9 PG (ref 26.5–33)
MCHC RBC AUTO-ENTMCNC: 31.6 G/DL (ref 31.5–36.5)
MCV RBC AUTO: 101 FL (ref 78–100)
PLATELET # BLD AUTO: 115 10E3/UL (ref 150–450)
POTASSIUM SERPL-SCNC: 4.7 MMOL/L (ref 3.4–5.3)
PROT SERPL-MCNC: 6.1 G/DL (ref 6.4–8.3)
RBC # BLD AUTO: 4.67 10E6/UL (ref 4.4–5.9)
SODIUM SERPL-SCNC: 137 MMOL/L (ref 135–145)
WBC # BLD AUTO: 11.2 10E3/UL (ref 4–11)

## 2025-07-04 PROCEDURE — 250N000013 HC RX MED GY IP 250 OP 250 PS 637: Performed by: PHYSICIAN ASSISTANT

## 2025-07-04 PROCEDURE — 120N000001 HC R&B MED SURG/OB

## 2025-07-04 PROCEDURE — 71045 X-RAY EXAM CHEST 1 VIEW: CPT

## 2025-07-04 PROCEDURE — 999N000157 HC STATISTIC RCP TIME EA 10 MIN

## 2025-07-04 PROCEDURE — 85018 HEMOGLOBIN: CPT

## 2025-07-04 PROCEDURE — 250N000011 HC RX IP 250 OP 636: Performed by: STUDENT IN AN ORGANIZED HEALTH CARE EDUCATION/TRAINING PROGRAM

## 2025-07-04 PROCEDURE — 85730 THROMBOPLASTIN TIME PARTIAL: CPT | Performed by: STUDENT IN AN ORGANIZED HEALTH CARE EDUCATION/TRAINING PROGRAM

## 2025-07-04 PROCEDURE — 250N000011 HC RX IP 250 OP 636

## 2025-07-04 PROCEDURE — 250N000013 HC RX MED GY IP 250 OP 250 PS 637: Performed by: STUDENT IN AN ORGANIZED HEALTH CARE EDUCATION/TRAINING PROGRAM

## 2025-07-04 PROCEDURE — 85014 HEMATOCRIT: CPT

## 2025-07-04 PROCEDURE — 250N000011 HC RX IP 250 OP 636: Performed by: INTERNAL MEDICINE

## 2025-07-04 PROCEDURE — 250N000013 HC RX MED GY IP 250 OP 250 PS 637: Performed by: INTERNAL MEDICINE

## 2025-07-04 PROCEDURE — 86140 C-REACTIVE PROTEIN: CPT | Performed by: STUDENT IN AN ORGANIZED HEALTH CARE EDUCATION/TRAINING PROGRAM

## 2025-07-04 PROCEDURE — 99233 SBSQ HOSP IP/OBS HIGH 50: CPT | Performed by: INTERNAL MEDICINE

## 2025-07-04 PROCEDURE — 84155 ASSAY OF PROTEIN SERUM: CPT | Performed by: STUDENT IN AN ORGANIZED HEALTH CARE EDUCATION/TRAINING PROGRAM

## 2025-07-04 PROCEDURE — 250N000013 HC RX MED GY IP 250 OP 250 PS 637

## 2025-07-04 PROCEDURE — 36415 COLL VENOUS BLD VENIPUNCTURE: CPT | Performed by: STUDENT IN AN ORGANIZED HEALTH CARE EDUCATION/TRAINING PROGRAM

## 2025-07-04 RX ORDER — FUROSEMIDE 10 MG/ML
40 INJECTION INTRAMUSCULAR; INTRAVENOUS
Status: DISCONTINUED | OUTPATIENT
Start: 2025-07-04 | End: 2025-07-06 | Stop reason: HOSPADM

## 2025-07-04 RX ADMIN — METRONIDAZOLE 500 MG: 500 INJECTION, SOLUTION INTRAVENOUS at 20:04

## 2025-07-04 RX ADMIN — FUROSEMIDE 40 MG: 10 INJECTION, SOLUTION INTRAMUSCULAR; INTRAVENOUS at 16:40

## 2025-07-04 RX ADMIN — METRONIDAZOLE 500 MG: 500 INJECTION, SOLUTION INTRAVENOUS at 11:50

## 2025-07-04 RX ADMIN — POLYETHYLENE GLYCOL 3350 17 G: 17 POWDER, FOR SOLUTION ORAL at 20:14

## 2025-07-04 RX ADMIN — ACETAMINOPHEN 975 MG: 325 TABLET ORAL at 11:50

## 2025-07-04 RX ADMIN — POLYETHYLENE GLYCOL 3350 17 G: 17 POWDER, FOR SOLUTION ORAL at 07:52

## 2025-07-04 RX ADMIN — SENNOSIDES AND DOCUSATE SODIUM 1 TABLET: 50; 8.6 TABLET ORAL at 07:52

## 2025-07-04 RX ADMIN — HEPARIN SODIUM 1650 UNITS/HR: 10000 INJECTION, SOLUTION INTRAVENOUS at 03:15

## 2025-07-04 RX ADMIN — LEVOFLOXACIN 750 MG: 5 INJECTION, SOLUTION INTRAVENOUS at 22:04

## 2025-07-04 RX ADMIN — HEPARIN SODIUM 1650 UNITS/HR: 10000 INJECTION, SOLUTION INTRAVENOUS at 00:26

## 2025-07-04 RX ADMIN — APIXABAN 5 MG: 5 TABLET, FILM COATED ORAL at 21:21

## 2025-07-04 RX ADMIN — HEPARIN SODIUM 1950 UNITS/HR: 10000 INJECTION, SOLUTION INTRAVENOUS at 06:56

## 2025-07-04 RX ADMIN — APIXABAN 5 MG: 5 TABLET, FILM COATED ORAL at 11:50

## 2025-07-04 RX ADMIN — SENNOSIDES AND DOCUSATE SODIUM 1 TABLET: 50; 8.6 TABLET ORAL at 20:10

## 2025-07-04 RX ADMIN — ACETAMINOPHEN 975 MG: 325 TABLET ORAL at 20:03

## 2025-07-04 RX ADMIN — METRONIDAZOLE 500 MG: 500 INJECTION, SOLUTION INTRAVENOUS at 03:18

## 2025-07-04 RX ADMIN — METFORMIN ER 500 MG 500 MG: 500 TABLET ORAL at 16:40

## 2025-07-04 RX ADMIN — ATENOLOL 25 MG: 25 TABLET ORAL at 07:52

## 2025-07-04 RX ADMIN — BUMETANIDE 1 MG: 1 TABLET ORAL at 07:52

## 2025-07-04 RX ADMIN — ACETAMINOPHEN 975 MG: 325 TABLET ORAL at 03:18

## 2025-07-04 RX ADMIN — HYDROMORPHONE HYDROCHLORIDE 0.5 MG: 1 INJECTION, SOLUTION INTRAMUSCULAR; INTRAVENOUS; SUBCUTANEOUS at 17:33

## 2025-07-04 ASSESSMENT — ACTIVITIES OF DAILY LIVING (ADL)
ADLS_ACUITY_SCORE: 34
ADLS_ACUITY_SCORE: 24
ADLS_ACUITY_SCORE: 34
ADLS_ACUITY_SCORE: 34
ADLS_ACUITY_SCORE: 24
ADLS_ACUITY_SCORE: 25
ADLS_ACUITY_SCORE: 34
ADLS_ACUITY_SCORE: 24

## 2025-07-04 NOTE — PROGRESS NOTES
Entered and reviewed chart for plan to transfer to MS. Pt will not stay in ICU at this time as MS status.

## 2025-07-04 NOTE — PROGRESS NOTES
"GENERAL SURGERY Progress Note    Admission Date: 2025         Assessment and Plan:     Miguel Baca is a 69 year old male S/P Procedure(s):  APPENDECTOMY, LAPAROSCOPIC with drain placement, 2 Days Post-Op.    - Tolerating regular diet, continue  -Continue bowel regimen  -Patient reports persistent 7/10 pain, but is comfortable and without any clear symptoms despite reporting significant pain   -WBC improving  -FRANSISCO drain removed  -Surgery will follow peripherally  -Okay for discharge when medically stable, follow up with general surgery in clinic in 2 weeks from surgery              Interval History:     Doing well. Complains of pain but appears visibly comfortable and nondistressed.                      Physical Exam:   Blood pressure 121/76, pulse 64, temperature 97.9  F (36.6  C), temperature source Oral, resp. rate 18, height 1.753 m (5' 9\"), weight (!) 159.4 kg (351 lb 6.6 oz), SpO2 (!) 91%.  Temperature Temp  Av.8  F (36.6  C)  Min: 97.7  F (36.5  C)  Max: 97.9  F (36.6  C)   I/O last 3 completed shifts:  In: 440 [P.O.:440]  Out: 40 [Drains:40]    Constitutional- No acute distress, well nourished, non-toxic  Eyes: Anicteric, no injection.  PERRL  Respiratory- nonlabored breathing on room air  Cardiovascular - Heart rate  Abdomen - Soft, nontender, nondistended morbidly obese abdomen. Fransisco drain with serosanguinous output.   Neuro - No focal neuro deficits, Alert and oriented x 3  Psych: Appropriate mood and affect  Musculoskeletal: Moves upper and lower extremities.  Skin: Warm, Dry         Data:     Recent Labs   Lab Test 25  0605 25  0616 25  0621   WBC 11.2* 14.4* 13.4*   HGB 14.9 15.1 16.1   HCT 47.2 48.3 50.6   * 119* 121*      Recent Labs   Lab Test 25  0605 25  0616 25  0621    134* 132*   POTASSIUM 4.7 4.9 5.0   CHLORIDE 101 98 95*   CO2 26 26 26   BUN 19.2 13.2 11.9   CR 0.96 0.83 1.06     Recent Labs   Lab Test 25  0605 " 07/02/25  0621 07/01/25  1540   BILITOTAL 0.3 1.3* 1.3*   ALT 12 18 25   AST 22 28 27   ALKPHOS 67 94 104      Recent Labs   Lab Test 07/04/25  0605 07/03/25  2357 07/03/25  1834   PTT 39* 45* 37     Recent Labs   Lab Test 07/04/25  0605 07/03/25  0616 07/02/25  0621   FILI 8.3* 8.8 8.5*   PHOS  --  2.8  --    MAG  --  2.2  --      Recent Labs   Lab Test 07/04/25  0605 07/03/25  0616 07/02/25  0621 07/01/25  1756 07/01/25  1540   ANIONGAP 10 10 11  --  13   PROTEIN  --   --   --  30*  --    ALBUMIN 2.9*  --  3.5  --  4.2       MICHEL REYES PA-C  07/04/25

## 2025-07-04 NOTE — PROGRESS NOTES
Transfer  Data:   Reason for Transport:  [facility]    Miguel Baca was transferred to [facility] Unit via wheelchair at this time.  Patient was accompanied by Registered Nurse. Equipment used for transport: Oxygen  Nasal Cannula. All belongings sent with Patient.    Action:  Report: given to [facility] RN who verbalized understanding of transfer.      Response:  Patient's condition when transferred was stable.    Lisbeth Barrow RN

## 2025-07-04 NOTE — PROGRESS NOTES
Sent refill via the emr    [facility] Medicine Progress Note  Date of Service (when I saw the patient): 07/04/2025    REASON FOR ADMISSION / INTERVAL HISTORY:  Miguel Baca is a 69 year old male with past medical history significant for bilateral pulmonary embolism on chronic anticoagulation, HTN, HFpEF, urinary retention who presents on 7/1/2025 with RLQ abdominal pain.   S/p surgery-see details below.      Assessment/ Plan     Acute appendicitis with perforation and localized peritonitis, without abscess or gangrene   Presented with progressive RLQ abd pain. CT abd/pelvis with Acute appendicitis with a few foci of extraluminal gas along the distal appendix consistent with localized perforation. No fluid collection.   WBC 13.5, ANC 11.4. Procalcitonin wnl. Was Febrile to 100.9F on ED arrival. Received 500mL bolus LR in the ED and started on levofloxacin and metronidazole.   s/p lap appendectomy 07/02  with drain.   WBC/ CRP improving. Tolerating regular diet.   - Continue IV levofloxacin 750mg qdaily and IV metronidazole 500mg q8hrs today. Could transition to po antbx in a day or so.   - General surgery following; appreciate management of drain  - Bowel: Miralax daily, sennokot BID schd (hold for loose stools)  - stop heparin and resume eliquis.  - acetaminophen, oxycodone PO, hydromorphone IV      Acute hypoxic and hypercapnic respiratory failure   Suspected Obstructive Sleep Apnea and Obesity-Hypoventilation Syndrome  Was not hypoxic on ED arrival. Developed mild hypoxia after receiving 25mcg IV fentanyl, now requiring 2L via NC to maintain SpO2 >90%. No recent illness or respiratory symptoms.   Suspect acute hypoxia secondary to hypoventilation in the setting of sedating medications.   07/02: Patient was hypercapnic post-op and transferred to [facility] for BIPAP. VBG improved.  Patient endorses having sleep studies ordered but not completing it  Still needing 1.5L o2 to keep sats up.   - will get CXR.  "Continue IS.   -Recommend patient to complete sleep study as previously ordered  -will hold bumex and start iv lasix for diffuse edema and 12-15lb wt gain since admit.     Essential hypertension   Normotensive on admission.   - Continued pta atenolol      Heart failure with preserved ejection fraction   Echocardiogram 10/30/2023 with EF 55-60%, moderate right ventricular chamber enlargement.   BNP 80.   Repeat TTE on 7/2 shows 55-60%, grade I diastolic dysfxn  - will hold bumex and start iv lasix for diffuse edema and 12-15lb wt gain since admit.  - resume  aldactone     Hepatic steatosis   CT abd/pelvis with diffuse hepatic steatosis.      Hx bilateral pulmonary embolism (10/2023)  Chronic anticoagulation   Noted on chart review. CT PE study at that time showed pulmonary emboli with multiple bilateral segmental and subsegmental acute appearing pulmonary emboli.   Has been on heparin gtt post-op. Doing well-alejandro po  -stop heparin and resume eliquis.     Urinary retention   Urethral stricture  Follows with [facility] last visit 04/09/25.      Class III Obesity  BMI >50     Contiributes to M&M, likely cause of hypercapnia and hypoxia       Diet: Regular Diet Adult    DVT Prophylaxis: DOAC  Erickson Catheter: Not present  Code Status: Full Code      Medically Ready for Discharge: Anticipated in 2-4 Days        ISRRAEL CAICEDO MD   Pg [phone]        ROS:  As described in A/P and Exam.  Otherwise ALL are  negative.    PHYSICAL EXAM:  All vitals have been reviewed    Blood pressure 121/76, pulse 64, temperature 97.9  F (36.6  C), temperature source Oral, resp. rate 18, height 1.753 m (5' 9\"), weight (!) 159.4 kg (351 lb 6.6 oz), SpO2 (!) 91%.    I/O this shift:  In: 200 [P.O.:200]  Out: 0     GENERAL APPEARANCE: obese, alert and no distress  EYES: conjunctiva clear, eyes grossly normal  HENT: external ears and nose normal   RESP: lungs -decrease BS bases - no rales, rhonchi or wheezes  CV: regular rate and rhythm, normal S1 " S2, no S3 or S4 and no murmur, click or rub   ABDOMEN: soft, nontender, no HSM or masses and bowel sounds normal  MS: no clubbing, cyanosis; 1+ hard indurated edema  SKIN: clear without significant rashes or lesions  NEURO: -non-focal moves all 4 extr    ROUTINE  LABS (Last four results)  CMP  Recent Labs   Lab 07/04/25  0605 07/03/25  0616 07/02/25  0621 07/01/25  1540    134* 132* 134*   POTASSIUM 4.7 4.9 5.0 5.3   CHLORIDE 101 98 95* 93*   CO2 26 26 26 28   ANIONGAP 10 10 11 13   * 142* 154* 130*   BUN 19.2 13.2 11.9 14.2   CR 0.96 0.83 1.06 1.10   GFRESTIMATED 85 >90 75 72   FILI 8.3* 8.8 8.5* 9.3   MAG  --  2.2  --   --    PHOS  --  2.8  --   --    PROTTOTAL 6.1*  --  6.6 8.1   ALBUMIN 2.9*  --  3.5 4.2   BILITOTAL 0.3  --  1.3* 1.3*   ALKPHOS 67  --  94 104   AST 22  --  28 27   ALT 12  --  18 25     CBC  Recent Labs   Lab 07/04/25  0605 07/03/25  0616 07/02/25  0621 07/01/25  2235   WBC 11.2* 14.4* 13.4* 15.9*   RBC 4.67 4.84 5.14 5.41   HGB 14.9 15.1 16.1 17.0   HCT 47.2 48.3 50.6 52.2   * 100 98 97   MCH 31.9 31.2 31.3 31.4   MCHC 31.6 31.3* 31.8 32.6   RDW 16.0* 16.0* 16.1* 15.9*   * 119* 121* 125*     INRNo lab results found in last 7 days.  Arterial Blood Gas  Recent Labs   Lab 07/03/25  0804 07/02/25  1813   O2PER 0 50       No results found for this or any previous visit (from the past 24 hours).

## 2025-07-04 NOTE — DISCHARGE INSTRUCTIONS
Home Care Following Append[facility]     [facility]  Pain meds:   600mg ibuprofen every 6hrs prn   650mg of acetaminophen every 6hrs prn  You can alternate these meds so that you take one every 3 hrs.    Make sure you do not go over 4000mg of acetaminophen every 24hrs, especially if you are taking Norco or percocet 5/325mg.    Care of the Incision:  Remove gauze dressing (if present) after 48 hours.  Leave small strips in place (if present).  They will gradually come off.  If surgical glue was used on your incision, keep it dry for 24 hours.  Then you may shower but don t submerge under water for at least 2 week.  Gently pat your incision dry with a freshly laundered towel.  Do not touch your incision with bare hands or pick at scabs.  Leave your incision open to air.  Cover it only if draining, clothing rubs or irritates it.    Activity:  Gradually increase your activity.  Walk short distances several times each day and increase the distance as your strength allows.  No strenuous lifting (more than 10-20 pounds) or straining for 2-3 weeks.  Do not lift anything over 10-20 pounds until your doctor approves an increase.  Return to work will be determined by the type of work you do and should be discussed with your physician.  Do not drive or operate equipment while taking prescription pain medicines.  You may drive 1 week after surgery if you have stopped taking prescription pain medicines and can react quickly enough to make an emergency stop if necessary.    Diet:  Return to the diet you were on before surgery.  Drink plenty of water.  Avoid foods that cause constipation.    REMEMBER--most prescription pain pills cause constipation.  Walking, extra fluids, and increased fiber (fresh fruits and vegetables, etc.) are natural remedies for constipation.  You can also take mineral oil, 1-2 Tablespoons per day.  If still constipated may try a stool softener such as Colace or Mirilax.    Call Your Physician if You  Have:  Redness, increased swelling or cloudy drainage from your incision.  A temperature of more than 101 degrees F.  Worsening pain in your incision not relieved by your prescription pain pills and/or a short rest.  Abdominal distention (stomach getting very large)  Swelling in your legs  Productive cough  Burning with urination  Any questions or concerns about your recovery, please call [facility] at [phone]   Follow-up Care:  Make an appointment 1-2 week after your surgery.  Call [facility] at [phone]

## 2025-07-04 NOTE — PLAN OF CARE
"  Problem: Adult Inpatient Plan of Care  Goal: Plan of Care Review  Description: The Plan of Care Review/Shift note should be completed every shift.  The Outcome Evaluation is a brief statement about your assessment that the patient is improving, declining, or no change.  This information will be displayed automatically on your shift  note.  Outcome: Progressing  Goal: Patient-Specific Goal (Individualized)  Description: You can add care plan individualizations to a care plan. Examples of Individualization might be:  \"Parent requests to be called daily at 9am for status\", \"I have a hard time hearing out of my right ear\", or \"Do not touch me to wake me up as it startles  me\".  Outcome: Progressing  Goal: Absence of Hospital-Acquired Illness or Injury  Outcome: Progressing  Intervention: Identify and Manage Fall Risk  Recent Flowsheet Documentation  Taken 7/4/2025 1643 by Lisbeth Barrow RN  Safety Promotion/Fall Prevention:   activity supervised   assistive device/personal items within reach   clutter free environment maintained   increased rounding and observation   increase visualization of patient   lighting adjusted   nonskid shoes/slippers when out of bed   patient and family education  Taken 7/4/2025 0754 by Lisbeth Barrow RN  Safety Promotion/Fall Prevention:   activity supervised   assistive device/personal items within reach   clutter free environment maintained   increased rounding and observation   increase visualization of patient   lighting adjusted   nonskid shoes/slippers when out of bed   patient and family education  Intervention: Prevent Skin Injury  Recent Flowsheet Documentation  Taken 7/4/2025 1643 by Lisbeth Barrow RN  Body Position: position changed independently  Taken 7/4/2025 0754 by Lisbeth Barrow RN  Body Position:   position maintained   weight shifting  Goal: Optimal Comfort and Wellbeing  Outcome: Progressing  Goal: Readiness for Transition of Care  Outcome: Progressing   "   Problem: Delirium  Goal: Optimal Coping  Outcome: Progressing  Goal: Improved Behavioral Control  Outcome: Progressing  Intervention: Minimize Safety Risk  Recent Flowsheet Documentation  Taken 7/4/2025 1643 by Lisbeth Barrow RN  Enhanced Safety Measures: pain management  Taken 7/4/2025 0754 by Lisbeth Barrow RN  Enhanced Safety Measures: pain management  Goal: Improved Attention and Thought Clarity  Outcome: Progressing  Goal: Improved Sleep  Outcome: Progressing     Problem: Risk for Delirium  Goal: Optimal Coping  Outcome: Progressing  Goal: Improved Behavioral Control  Outcome: Progressing  Intervention: Minimize Safety Risk  Recent Flowsheet Documentation  Taken 7/4/2025 1643 by Lisbeth Barrow RN  Enhanced Safety Measures: pain management  Taken 7/4/2025 0754 by Lisbeth Barrow RN  Enhanced Safety Measures: pain management  Goal: Improved Attention and Thought Clarity  Outcome: Progressing  Goal: Improved Sleep  Outcome: Progressing   Goal Outcome Evaluation:

## 2025-07-04 NOTE — PROGRESS NOTES
[facility] TRANSPORT NOTE  Data:   Reason for Transport:  ICU Transfer     Miguel Baca was transported from ICU via wheel chair at 1720. Patient was accompanied by Nursing Assistant. Equipment used for transport: Oxygen  Nasal Cannula. Family was aware of reason for transport: yes    Action:  Report: received from Lisbeth WILSON RN    Response:  Patient's condition upon transfer   from ICU was stable.    Priscila Ruth RN

## 2025-07-04 NOTE — PLAN OF CARE
End Of Shift Note     Situation: Lap appe w/ FRANSISCO drain         Subjective/Objective:     Neuro: A/O, slept well tonight.      Cardiac: VSS     Resp: lungs clear, 3L NC overnight.      GI/: [place] urine, ambulates to bathroom.      MSK: One assist w/ walker and belt.      Skin: skin dry, no breakdown noted.      LDAs: PIV 2x.

## 2025-07-05 LAB
ANION GAP SERPL CALCULATED.3IONS-SCNC: 11 MMOL/L (ref 7–15)
BASOPHILS # BLD AUTO: 0 10E3/UL (ref 0–0.2)
BASOPHILS NFR BLD AUTO: 0 %
BUN SERPL-MCNC: 19.3 MG/DL (ref 8–23)
CALCIUM SERPL-MCNC: 8.6 MG/DL (ref 8.8–10.4)
CHLORIDE SERPL-SCNC: 97 MMOL/L (ref 98–107)
CREAT SERPL-MCNC: 0.89 MG/DL (ref 0.67–1.17)
EGFRCR SERPLBLD CKD-EPI 2021: >90 ML/MIN/1.73M2
EOSINOPHIL # BLD AUTO: 0.1 10E3/UL (ref 0–0.7)
EOSINOPHIL NFR BLD AUTO: 2 %
ERYTHROCYTE [DISTWIDTH] IN BLOOD BY AUTOMATED COUNT: 15.9 % (ref 10–15)
GLUCOSE SERPL-MCNC: 146 MG/DL (ref 70–99)
HCO3 SERPL-SCNC: 28 MMOL/L (ref 22–29)
HCT VFR BLD AUTO: 46.2 % (ref 40–53)
HGB BLD-MCNC: 14.4 G/DL (ref 13.3–17.7)
IMM GRANULOCYTES # BLD: 0.1 10E3/UL
IMM GRANULOCYTES NFR BLD: 1 %
LYMPHOCYTES # BLD AUTO: 1.2 10E3/UL (ref 0.8–5.3)
LYMPHOCYTES NFR BLD AUTO: 15 %
MCH RBC QN AUTO: 31.4 PG (ref 26.5–33)
MCHC RBC AUTO-ENTMCNC: 31.2 G/DL (ref 31.5–36.5)
MCV RBC AUTO: 101 FL (ref 78–100)
MONOCYTES # BLD AUTO: 0.6 10E3/UL (ref 0–1.3)
MONOCYTES NFR BLD AUTO: 7 %
NEUTROPHILS # BLD AUTO: 6.1 10E3/UL (ref 1.6–8.3)
NEUTROPHILS NFR BLD AUTO: 75 %
NRBC # BLD AUTO: 0 10E3/UL
NRBC BLD AUTO-RTO: 0 /100
PLATELET # BLD AUTO: 129 10E3/UL (ref 150–450)
POTASSIUM SERPL-SCNC: 4.3 MMOL/L (ref 3.4–5.3)
RBC # BLD AUTO: 4.58 10E6/UL (ref 4.4–5.9)
SODIUM SERPL-SCNC: 136 MMOL/L (ref 135–145)
WBC # BLD AUTO: 8.1 10E3/UL (ref 4–11)

## 2025-07-05 PROCEDURE — 99233 SBSQ HOSP IP/OBS HIGH 50: CPT | Performed by: INTERNAL MEDICINE

## 2025-07-05 PROCEDURE — 120N000001 HC R&B MED SURG/OB

## 2025-07-05 PROCEDURE — 36415 COLL VENOUS BLD VENIPUNCTURE: CPT | Performed by: INTERNAL MEDICINE

## 2025-07-05 PROCEDURE — 85004 AUTOMATED DIFF WBC COUNT: CPT | Performed by: INTERNAL MEDICINE

## 2025-07-05 PROCEDURE — 250N000013 HC RX MED GY IP 250 OP 250 PS 637

## 2025-07-05 PROCEDURE — 250N000011 HC RX IP 250 OP 636: Performed by: INTERNAL MEDICINE

## 2025-07-05 PROCEDURE — 80048 BASIC METABOLIC PNL TOTAL CA: CPT | Performed by: INTERNAL MEDICINE

## 2025-07-05 PROCEDURE — 250N000013 HC RX MED GY IP 250 OP 250 PS 637: Performed by: PHYSICIAN ASSISTANT

## 2025-07-05 PROCEDURE — 250N000011 HC RX IP 250 OP 636

## 2025-07-05 PROCEDURE — 250N000013 HC RX MED GY IP 250 OP 250 PS 637: Performed by: INTERNAL MEDICINE

## 2025-07-05 RX ADMIN — METRONIDAZOLE 500 MG: 500 INJECTION, SOLUTION INTRAVENOUS at 12:44

## 2025-07-05 RX ADMIN — POLYETHYLENE GLYCOL 3350 17 G: 17 POWDER, FOR SOLUTION ORAL at 08:00

## 2025-07-05 RX ADMIN — ACETAMINOPHEN 975 MG: 325 TABLET ORAL at 20:00

## 2025-07-05 RX ADMIN — METRONIDAZOLE 500 MG: 500 INJECTION, SOLUTION INTRAVENOUS at 21:38

## 2025-07-05 RX ADMIN — LEVOFLOXACIN 750 MG: 5 INJECTION, SOLUTION INTRAVENOUS at 20:01

## 2025-07-05 RX ADMIN — SPIRONOLACTONE 50 MG: 50 TABLET ORAL at 08:01

## 2025-07-05 RX ADMIN — FUROSEMIDE 40 MG: 10 INJECTION, SOLUTION INTRAMUSCULAR; INTRAVENOUS at 15:43

## 2025-07-05 RX ADMIN — OXYCODONE HYDROCHLORIDE 5 MG: 5 TABLET ORAL at 08:01

## 2025-07-05 RX ADMIN — APIXABAN 5 MG: 5 TABLET, FILM COATED ORAL at 08:01

## 2025-07-05 RX ADMIN — ACETAMINOPHEN 975 MG: 325 TABLET ORAL at 02:57

## 2025-07-05 RX ADMIN — FUROSEMIDE 40 MG: 10 INJECTION, SOLUTION INTRAMUSCULAR; INTRAVENOUS at 08:08

## 2025-07-05 RX ADMIN — SENNOSIDES AND DOCUSATE SODIUM 1 TABLET: 50; 8.6 TABLET ORAL at 08:01

## 2025-07-05 RX ADMIN — ACETAMINOPHEN 975 MG: 325 TABLET ORAL at 12:44

## 2025-07-05 RX ADMIN — METFORMIN ER 500 MG 500 MG: 500 TABLET ORAL at 17:23

## 2025-07-05 RX ADMIN — APIXABAN 5 MG: 5 TABLET, FILM COATED ORAL at 21:37

## 2025-07-05 RX ADMIN — SENNOSIDES AND DOCUSATE SODIUM 1 TABLET: 50; 8.6 TABLET ORAL at 20:00

## 2025-07-05 RX ADMIN — OXYCODONE HYDROCHLORIDE 5 MG: 5 TABLET ORAL at 15:43

## 2025-07-05 RX ADMIN — ATENOLOL 25 MG: 25 TABLET ORAL at 08:01

## 2025-07-05 RX ADMIN — MAGNESIUM HYDROXIDE 30 ML: 400 SUSPENSION ORAL at 08:00

## 2025-07-05 RX ADMIN — METRONIDAZOLE 500 MG: 500 INJECTION, SOLUTION INTRAVENOUS at 02:58

## 2025-07-05 ASSESSMENT — ACTIVITIES OF DAILY LIVING (ADL)
ADLS_ACUITY_SCORE: 35
ADLS_ACUITY_SCORE: 36
ADLS_ACUITY_SCORE: 44
ADLS_ACUITY_SCORE: 36
ADLS_ACUITY_SCORE: 35
ADLS_ACUITY_SCORE: 36
ADLS_ACUITY_SCORE: 36
ADLS_ACUITY_SCORE: 35
ADLS_ACUITY_SCORE: 44
ADLS_ACUITY_SCORE: 35
ADLS_ACUITY_SCORE: 35
ADLS_ACUITY_SCORE: 44
ADLS_ACUITY_SCORE: 44
ADLS_ACUITY_SCORE: 36

## 2025-07-05 NOTE — PLAN OF CARE
Problem: Adult Inpatient Plan of Care  Goal: Plan of Care Review  Outcome: Progressing  Flowsheets (Taken 7/4/2025 2041)  Plan of Care Reviewed With: patient  Overall Patient Progress: no change     Problem: Appendectomy  Goal: Absence of Bleeding  Outcome: Progressing  Goal: Nausea and Vomiting Relief  Outcome: Progressing  Goal: Effective Urinary Elimination  Outcome: Progressing  Intervention: Monitor and Manage Urinary Retention  Flowsheets (Taken 7/4/2025 2041)  Urinary Elimination Promotion: frequent voiding encouraged  Goal: Effective Oxygenation and Ventilation  Outcome: Progressing  Intervention: Optimize Oxygenation and Ventilation  Flowsheets (Taken 7/4/2025 2041)  Airway/Ventilation Management: airway patency maintained  Head of Bed (HOB) Positioning: HOB at 30-45 degrees     Problem: Appendectomy  Goal: Effective Bowel Elimination  Outcome: Not Progressing  Intervention: Enhance Bowel Motility and Elimination  Flowsheets (Taken 7/4/2025 2041)  Bowel Motility Enhancement:   ambulation promoted   fluid intake encouraged   oral intake encouraged     Goal Outcome Evaluation:    Patient is A&O and able to make his needs known appropriately. Patient has been calm and cooperative this shift. Patient's VSS, afebrile, on 1.5 LPM of oxygen via NC for SPO2 of 91-93% and shortness of breath noted upon exertion. Patient's pain well managed with PRN Dilaudid and scheduled Tylenol, one dose 0.5 mg of Dilaudid given upon transfer to unit for 7/10 pain, was effective in bringing pain down to 5/10. Patient had a shower this evening. Patient had decent appetite for dinner. Patient received prune juice and PRN Miralax as he is unsure of when his last BM was, very small amount of stool noted by NST upon assisting patient with shower this evening. Patient is passing gas. Abdomen is rounded/distended and firm to touch to lower quadrants, bowel sounds present. Patient's PIV flushes with success, receiving IV ABX, otherwise SL.  Patient up with assist of 1 with walker and gait belt. Will continue to monitor per plan of care.       Plan of Care Reviewed With: patient    Overall Patient Progress: no change

## 2025-07-05 NOTE — PLAN OF CARE
Problem: Adult Inpatient Plan of Care  Goal: Plan of Care Review  Outcome: Progressing  Flowsheets (Taken 7/5/2025 1238)  Plan of Care Reviewed With: patient  Overall Patient Progress: improving     Problem: Appendectomy  Goal: Absence of Bleeding  Outcome: Progressing  Intervention: Monitor and Manage Bleeding  Flowsheets (Taken 7/5/2025 1238)  Bleeding Management: dressing monitored  Goal: Acceptable Pain Control  Outcome: Progressing  Intervention: Prevent or Manage Pain  Flowsheets (Taken 7/5/2025 1238)  Pain Management Interventions:   medication (see MAR)   repositioned   rest  Goal: Nausea and Vomiting Relief  Outcome: Progressing  Goal: Effective Urinary Elimination  Outcome: Progressing  Goal: Effective Oxygenation and Ventilation  Outcome: Progressing     Problem: Appendectomy  Goal: Effective Bowel Elimination  Outcome: Not Progressing  Intervention: Enhance Bowel Motility and Elimination  Recent Flowsheet Documentation  Taken 7/5/2025 0801 by Priscila Ruth RN  Bowel Motility Enhancement:   ambulation promoted   oral intake encouraged  Bowel Elimination Management: (Scheduled and PRN bowel medications given.)   toileting offered   other (see comments)     Goal Outcome Evaluation:    Patient is A&O and able to make his needs known appropriately. Patient has been calm and cooperative towards staff this shift. Patient's VSS, afebrile, and now on RA; patient was on 2 LPM of humidification for resting SPO2 between 87-89%. Patient received one dose of PRN 5 mg oxycodone this AM for abdominal pain, with assumed relief. Patient up with assist of one with walker and gait belt. Patient had decent appetite for breakfast and lunch. Patient ambulated in the hallways this afternoon and tolerated well. Patient receiving IV ABX, PIV otherwise SL. Patient received dose of PRN milk of mag and scheduled mirlalax with no relief. Will continue to monitor per plan of care.         Plan of Care Reviewed With:  patient    Overall Patient Progress: improving

## 2025-07-05 NOTE — PLAN OF CARE
Problem: Appendectomy  Goal: Absence of Bleeding  Outcome: Progressing  Goal: Effective Bowel Elimination  Outcome: Not Progressing  Goal: Fluid and Electrolyte Balance  Outcome: Progressing  Goal: Absence of Infection Signs and Symptoms  Outcome: Progressing  Goal: Anesthesia/Sedation Recovery  Outcome: Progressing  Intervention: Optimize Anesthesia Recovery  Recent Flowsheet Documentation  Taken 7/5/2025 0000 by Devick, Karen M, RN  Safety Promotion/Fall Prevention:   activity supervised   clutter free environment maintained   mobility aid in reach   nonskid shoes/slippers when out of bed   room near nurse's station   room organization consistent   safety round/check completed   supervised activity  Goal: Acceptable Pain Control  Outcome: Progressing  Goal: Nausea and Vomiting Relief  Outcome: Progressing  Goal: Effective Urinary Elimination  Outcome: Progressing  Goal: Effective Oxygenation and Ventilation  Outcome: Progressing  Intervention: Optimize Oxygenation and Ventilation  Recent Flowsheet Documentation  Taken 7/5/2025 0000 by Devick, Karen M, RN  Head of Bed (HOB) Positioning: HOB at 30-45 degrees     Goal Outcome Evaluation: Patient alert and oriented. Up with assist of one gait belt and walker. Abdominal Lap sites clean dry and intact with redness around them. On 1 L O2. Patient states he has not had a bowel movement since before he came in. Drinking Prune Juice, given Miralax and senna on evening shift, no results yet.

## 2025-07-05 NOTE — PROGRESS NOTES
[facility] Medicine Progress Note  Date of Service (when I saw the patient): 07/05/2025    REASON FOR ADMISSION / INTERVAL HISTORY:  Miguel Baca is a 69 year old male with past medical history significant for bilateral pulmonary embolism on chronic anticoagulation, HTN, HFpEF, urinary retention who presents on 7/1/2025 with RLQ abdominal pain.   S/p surgery-see details below.      Assessment/ Plan     Acute appendicitis with perforation and localized peritonitis, without abscess or gangrene   Presented with progressive RLQ abd pain. CT abd/pelvis with Acute appendicitis with a few foci of extraluminal gas along the distal appendix consistent with localized perforation. No fluid collection.   WBC 13.5, ANC 11.4. Procalcitonin wnl. Was Febrile to 100.9F on ED arrival. Received 500mL bolus LR in the ED and started on levofloxacin and metronidazole.   s/p lap appendectomy 07/02  with drain.   WBC/ CRP improving. Tolerating regular diet. FRANSISCO drain removed 7/4. Resumed eliquis 7/4. No BM yet  - Continue IV levofloxacin 750mg qdaily and IV metronidazole 500mg q8hrs today. Could transition to po antbx on discharge for total 7-10 days.   -continue Miralax daily, sennokot BID schd   - acetaminophen, oxycodone PO, hydromorphone IV      Acute hypoxic and hypercapnic respiratory failure   Suspected Obstructive Sleep Apnea and Obesity-Hypoventilation Syndrome  Generalized edema  Was not hypoxic on ED arrival. Developed mild hypoxia after receiving 25mcg IV fentanyl, now requiring 2L via NC to maintain SpO2 >90%. No recent illness or respiratory symptoms.   Suspect acute hypoxia secondary to hypoventilation in the setting of sedating medications.   07/02: Patient was hypercapnic post-op and transferred to [facility] for BIPAP. VBG improved.  Patient endorses having sleep studies ordered but not completing it  Has been on 2 L NC until this AM o2 to keep sats up. cxr 7/4-bibasilar atelectasis. Held  "bumex and started iv lasix for diffuse edema and 12-15lb wt gain since admit.  O2 sats stable on RA now. Weight unchanged (slightly worse but new scale)  -  Continue IS/ continue iv lasix today   -Recommend patient to complete sleep study as previously ordered    Essential hypertension   Normotensive on admission.   - Continued pta atenolol      Heart failure with preserved ejection fraction   Echocardiogram 10/30/2023 with EF 55-60%, moderate right ventricular chamber enlargement.   BNP 80.   Repeat TTE on 7/2 shows 55-60%, grade I diastolic dysfxn  - continue to hold bumex and continue iv lasix for diffuse edema and 12-15lb wt gain since admit.  - continue   aldactone     Hepatic steatosis   CT abd/pelvis with diffuse hepatic steatosis.      Hx bilateral pulmonary embolism (10/2023)  Chronic anticoagulation   Noted on chart review. CT PE study at that time showed pulmonary emboli with multiple bilateral segmental and subsegmental acute appearing pulmonary emboli.   Has been on heparin gtt post-op. Stopped heparin and resumed eliquis 7/4.      Urinary retention   Urethral stricture  Follows with [facility] last visit 04/09/25.      Class III Obesity  BMI >50     Contiributes to M&M, likely cause of hypercapnia and hypoxia       Diet: Regular Diet Adult    DVT Prophylaxis: DOAC  Erickson Catheter: Not present  Code Status: Full Code      Medically Ready for Discharge: Anticipated Tomorrow        ISRRAEL CAICEDO MD   Pg [phone]        ROS:  As described in A/P and Exam.  Otherwise ALL are  negative.    PHYSICAL EXAM:  All vitals have been reviewed    Blood pressure 123/69, pulse 78, temperature 98  F (36.7  C), temperature source Oral, resp. rate 16, height 1.753 m (5' 9\"), weight (!) 160.7 kg (354 lb 3.2 oz), SpO2 (!) 89%.    I/O this shift:  In: 240 [P.O.:240]  Out: -     GENERAL APPEARANCE: obese, alert and no distress  EYES: conjunctiva clear, eyes grossly normal  HENT: external ears and nose normal   RESP: lungs " -decrease BS bases - no rales, rhonchi or wheezes  CV: regular rate and rhythm, normal S1 S2, no S3 or S4 and no murmur, click or rub   ABDOMEN: soft, nontender, no HSM or masses and bowel sounds normal  MS: no clubbing, cyanosis; 1+ hard indurated edema  SKIN: clear without significant rashes or lesions  NEURO: -non-focal moves all 4 extr    ROUTINE  LABS (Last four results)  CMP  Recent Labs   Lab 07/05/25  0550 07/04/25  0605 07/03/25  0616 07/02/25  0621 07/01/25  1540    137 134* 132* 134*   POTASSIUM 4.3 4.7 4.9 5.0 5.3   CHLORIDE 97* 101 98 95* 93*   CO2 28 26 26 26 28   ANIONGAP 11 10 10 11 13   * 126* 142* 154* 130*   BUN 19.3 19.2 13.2 11.9 14.2   CR 0.89 0.96 0.83 1.06 1.10   GFRESTIMATED >90 85 >90 75 72   FILI 8.6* 8.3* 8.8 8.5* 9.3   MAG  --   --  2.2  --   --    PHOS  --   --  2.8  --   --    PROTTOTAL  --  6.1*  --  6.6 8.1   ALBUMIN  --  2.9*  --  3.5 4.2   BILITOTAL  --  0.3  --  1.3* 1.3*   ALKPHOS  --  67  --  94 104   AST  --  22  --  28 27   ALT  --  12  --  18 25     CBC  Recent Labs   Lab 07/05/25  0550 07/04/25  0605 07/03/25  0616 07/02/25  0621   WBC 8.1 11.2* 14.4* 13.4*   RBC 4.58 4.67 4.84 5.14   HGB 14.4 14.9 15.1 16.1   HCT 46.2 47.2 48.3 50.6   * 101* 100 98   MCH 31.4 31.9 31.2 31.3   MCHC 31.2* 31.6 31.3* 31.8   RDW 15.9* 16.0* 16.0* 16.1*   * 115* 119* 121*     INRNo lab results found in last 7 days.  Arterial Blood Gas  Recent Labs   Lab 07/03/25  0804 07/02/25  1813   O2PER 0 50       Recent Results (from the past 24 hours)   XR Chest Port 1 View    Narrative    EXAM: XR CHEST PORT 1 VIEW  LOCATION: [facility]  DATE: 7/4/2025    INDICATION: hypoxia  COMPARISON: 11/2/2023       Impression    IMPRESSION: Shallow inspiration with atelectasis in the lung bases. Chest otherwise negative. No change from prior.

## 2025-07-06 VITALS
DIASTOLIC BLOOD PRESSURE: 54 MMHG | OXYGEN SATURATION: 95 % | BODY MASS INDEX: 46.65 KG/M2 | TEMPERATURE: 98.2 F | RESPIRATION RATE: 20 BRPM | HEIGHT: 69 IN | SYSTOLIC BLOOD PRESSURE: 115 MMHG | WEIGHT: 315 LBS | HEART RATE: 63 BPM

## 2025-07-06 PROCEDURE — 250N000013 HC RX MED GY IP 250 OP 250 PS 637: Performed by: INTERNAL MEDICINE

## 2025-07-06 PROCEDURE — 250N000013 HC RX MED GY IP 250 OP 250 PS 637: Performed by: PHYSICIAN ASSISTANT

## 2025-07-06 PROCEDURE — 250N000011 HC RX IP 250 OP 636: Performed by: INTERNAL MEDICINE

## 2025-07-06 PROCEDURE — 250N000011 HC RX IP 250 OP 636

## 2025-07-06 PROCEDURE — 250N000013 HC RX MED GY IP 250 OP 250 PS 637

## 2025-07-06 PROCEDURE — 99239 HOSP IP/OBS DSCHRG MGMT >30: CPT | Performed by: INTERNAL MEDICINE

## 2025-07-06 RX ORDER — AMOXICILLIN 250 MG
2 CAPSULE ORAL 2 TIMES DAILY
Status: DISCONTINUED | OUTPATIENT
Start: 2025-07-06 | End: 2025-07-06 | Stop reason: HOSPADM

## 2025-07-06 RX ORDER — MAGNESIUM CARB/ALUMINUM HYDROX 105-160MG
296 TABLET,CHEWABLE ORAL ONCE
Status: COMPLETED | OUTPATIENT
Start: 2025-07-06 | End: 2025-07-06

## 2025-07-06 RX ORDER — METRONIDAZOLE 500 MG/1
500 TABLET ORAL 3 TIMES DAILY
Qty: 6 TABLET | Refills: 0 | Status: SHIPPED | OUTPATIENT
Start: 2025-07-06 | End: 2025-07-08

## 2025-07-06 RX ORDER — LEVOFLOXACIN 750 MG/1
750 TABLET, FILM COATED ORAL DAILY
Qty: 2 TABLET | Refills: 0 | Status: SHIPPED | OUTPATIENT
Start: 2025-07-06 | End: 2025-07-08

## 2025-07-06 RX ADMIN — APIXABAN 5 MG: 5 TABLET, FILM COATED ORAL at 09:30

## 2025-07-06 RX ADMIN — POLYETHYLENE GLYCOL 3350 17 G: 17 POWDER, FOR SOLUTION ORAL at 09:27

## 2025-07-06 RX ADMIN — MAGNESIUM HYDROXIDE 30 ML: 400 SUSPENSION ORAL at 09:29

## 2025-07-06 RX ADMIN — ATENOLOL 25 MG: 25 TABLET ORAL at 09:30

## 2025-07-06 RX ADMIN — ACETAMINOPHEN 650 MG: 325 TABLET ORAL at 09:30

## 2025-07-06 RX ADMIN — ACETAMINOPHEN 975 MG: 325 TABLET ORAL at 14:20

## 2025-07-06 RX ADMIN — SENNOSIDES AND DOCUSATE SODIUM 1 TABLET: 50; 8.6 TABLET ORAL at 09:31

## 2025-07-06 RX ADMIN — METRONIDAZOLE 500 MG: 500 INJECTION, SOLUTION INTRAVENOUS at 14:21

## 2025-07-06 RX ADMIN — METRONIDAZOLE 500 MG: 500 INJECTION, SOLUTION INTRAVENOUS at 04:34

## 2025-07-06 RX ADMIN — SPIRONOLACTONE 50 MG: 50 TABLET ORAL at 09:30

## 2025-07-06 RX ADMIN — ACETAMINOPHEN 975 MG: 325 TABLET ORAL at 04:33

## 2025-07-06 RX ADMIN — FUROSEMIDE 40 MG: 10 INJECTION, SOLUTION INTRAMUSCULAR; INTRAVENOUS at 09:27

## 2025-07-06 RX ADMIN — SENNOSIDES AND DOCUSATE SODIUM 2 TABLET: 8.6; 5 TABLET ORAL at 09:29

## 2025-07-06 ASSESSMENT — ACTIVITIES OF DAILY LIVING (ADL)
ADLS_ACUITY_SCORE: 46
ADLS_ACUITY_SCORE: 44
ADLS_ACUITY_SCORE: 46
ADLS_ACUITY_SCORE: 44
ADLS_ACUITY_SCORE: 44
ADLS_ACUITY_SCORE: 46
ADLS_ACUITY_SCORE: 46
ADLS_ACUITY_SCORE: 45
ADLS_ACUITY_SCORE: 44
ADLS_ACUITY_SCORE: 46

## 2025-07-06 NOTE — DISCHARGE SUMMARY
[facility] Hospitalist Discharge Summary    Miguel Baca MRN# [id number]   Age: 69 year old YOB: 1955     Date of Admission:  7/1/2025  Date of Discharge::  7/6/2025  Admitting Physician:  Demetris Collier DO  Discharge Physician:  Freddy Ring MD  Primary Physician: Yahaira Fernandez  Transferring Facility: N/A     Home clinic: unknown          Admission Diagnoses:   Chronic anticoagulation [Z79.01]  Abnormal CT scan [R93.89]  Acute appendicitis with perforation and localized peritonitis, without abscess or gangrene [K35.32]          Discharge Diagnosis:     Principle diagnosis: Acute appendicitis with perforation and localized peritonitis, without abscess or gangrene   Secondary diagnoses:  Patient Active Problem List   Diagnosis    Essential Hypertension    Seborrheic Keratosis    Knee Sprain    Primary osteoarthritis of both knees    Bilateral pulmonary embolism (H)    Acute combined systolic and diastolic congestive heart failure (H)    Thrombocytopenia    Acute congestive heart failure, unspecified heart failure type (H)    Hypertensive heart disease with heart failure (H)    Abnormal CT scan    Acute appendicitis with perforation and localized peritonitis, without abscess or gangrene          Brief History of Presenting Illness:   As per admit hx  Miguel Baca is a 69 year old male with past medical history significant for bilateral pulmonary embolism on chronic anticoagulation, HTN, HFpEF, urinary retention who presents on 7/1/2025 with RLQ abdominal pain.      Malachi had acute onset right lower quadrant abdominal pain upon waking on morning of admission. He describes a deep, stabbing pain in the right lower quadrant that does not radiate. The pain has been progressive throughout the day.   Endorses increased fatigue one day pta, no other concerning symptoms prior to day of admission.   He denies fevers, chills, diaphoresis, nausea, vomiting, change in bowel movements, melena,  hematochezia, diarrhea, dysuria.   Denies changes in diet, last intake was an apple on day of admission.      [place] Endorses good medication compliance. Denies tobacco use, illicit drug use. Drinks alcohol 2-3 times weekly, estimates 1-6 drinks on any night.             Hospital Course:   Acute appendicitis with perforation and localized peritonitis, without abscess or gangrene   Presented with progressive RLQ abd pain. CT abd/pelvis with Acute appendicitis with a few foci of extraluminal gas along the distal appendix consistent with localized perforation. No fluid collection.   WBC 13.5, ANC 11.4. Procalcitonin wnl. Was Febrile to 100.9F on ED arrival. Received 500mL bolus LR in the ED and started on levofloxacin and metronidazole.   s/p lap appendectomy 07/02  with drain.   WBC/ CRP improving. Tolerating regular diet. FRANSISCO drain removed 7/4. Resumed eliquis 7/4. No BM yet  Will complete total 7 day course of antbx on discharge -needs 2 more days.      Acute hypoxic and hypercapnic respiratory failure   Suspected Obstructive Sleep Apnea and Obesity-Hypoventilation Syndrome  Generalized edema-resolved  Was not hypoxic on ED arrival. Developed mild hypoxia after receiving 25mcg IV fentanyl, now requiring 2L via NC to maintain SpO2 >90%. No recent illness or respiratory symptoms.   Suspect acute hypoxia secondary to hypoventilation in the setting of sedating medications.   07/02: Patient was hypercapnic post-op and transferred to [facility] for BIPAP. VBG improved.  Patient endorses having sleep studies ordered but not completing it  CXR 7/4-bibasilar atelectasis. Held bumex and started iv lasix for diffuse edema and 5-7 lb wt gain since admit. Started iv lasix 7/4 at 40mg iv q12 hrs.   Weight  has remained unchanged (2 different scales-one ICU and one med-surg). Pt has good UO denies SOB. Feels at his baseline. Needs 2L o2 with activity. Edema significantly improved.   -will resume home bumex  on discharge. New home o2-2L with activity   -new sleep referral given. Pt did not follow up with last study and does not seem interested  in pursuing now.      Essential hypertension   Normotensive on admission.   - Continued pta atenolol      Heart failure with preserved ejection fraction   Echocardiogram 10/30/2023 with EF 55-60%, moderate right ventricular chamber enlargement.   BNP 80.   Repeat TTE on 7/2 shows 55-60%, grade I diastolic dysfxn  - continue to hold bumex and continue iv lasix for diffuse edema and 12-15lb wt gain since admit.  - continue   aldactone     Hepatic steatosis   CT abd/pelvis with diffuse hepatic steatosis.      Hx bilateral pulmonary embolism (10/2023)  Chronic anticoagulation   Noted on chart review. CT PE study at that time showed pulmonary emboli with multiple bilateral segmental and subsegmental acute appearing pulmonary emboli.   Has been on heparin gtt post-op. Stopped heparin and resumed eliquis 7/4.      Urinary retention   Urethral stricture  Follows with [facility] last visit 04/09/25.      Class III Obesity  BMI >50     Contiributes to M&M, likely cause of hypercapnia and hypoxia             Procedures:   No procedures performed during this admission         Allergies:      Allergies   Allergen Reactions    Ceftriaxone Anaphylaxis             Medications Prior to Admission:     Medications Prior to Admission   Medication Sig Dispense Refill Last Dose/Taking    apixaban ANTICOAGULANT (ELIQUIS ANTICOAGULANT) 5 MG tablet Take 1 tablet (5 mg) by mouth 2 times daily. 180 tablet 3 7/1/2025 Morning    atenolol (TENORMIN) 25 MG tablet Take 1 tablet (25 mg) by mouth daily. 90 tablet 3 7/1/2025 Morning    bumetanide (BUMEX) 1 MG tablet Take 1 tablet (1 mg) by mouth 2 times daily. 180 tablet 3 7/1/2025 Morning    metFORMIN (GLUCOPHAGE XR) 500 MG 24 hr tablet Take 1 tablet (500 mg) by mouth daily (with dinner). 180 tablet 1 6/30/2025 Evening    spironolactone (ALDACTONE) 25 MG tablet  Take 2 tablets (50 mg) by mouth every morning. 180 tablet 3 7/1/2025 Morning    tiZANidine (ZANAFLEX) 4 MG tablet Take 1 tablet (4 mg) by mouth nightly as needed for muscle spasms. 30 tablet 1 6/30/2025 Bedtime             Discharge Medications:     Current Discharge Medication List        START taking these medications    Details   levofloxacin (LEVAQUIN) 750 MG tablet Take 1 tablet (750 mg) by mouth daily for 2 days.  Qty: 2 tablet, Refills: 0    Associated Diagnoses: Acute appendicitis with perforation and localized peritonitis, without abscess or gangrene      metroNIDAZOLE (FLAGYL) 500 MG tablet Take 1 tablet (500 mg) by mouth 3 times daily for 2 days.  Qty: 6 tablet, Refills: 0    Associated Diagnoses: Acute appendicitis with perforation and localized peritonitis, without abscess or gangrene           CONTINUE these medications which have NOT CHANGED    Details   apixaban ANTICOAGULANT (ELIQUIS ANTICOAGULANT) 5 MG tablet Take 1 tablet (5 mg) by mouth 2 times daily.  Qty: 180 tablet, Refills: 3    Associated Diagnoses: Bilateral pulmonary embolism (H)      atenolol (TENORMIN) 25 MG tablet Take 1 tablet (25 mg) by mouth daily.  Qty: 90 tablet, Refills: 3    Associated Diagnoses: Essential hypertension      bumetanide (BUMEX) 1 MG tablet Take 1 tablet (1 mg) by mouth 2 times daily.  Qty: 180 tablet, Refills: 3    Associated Diagnoses: Bilateral lower extremity edema      metFORMIN (GLUCOPHAGE XR) 500 MG 24 hr tablet Take 1 tablet (500 mg) by mouth daily (with dinner).  Qty: 180 tablet, Refills: 1    Associated Diagnoses: Type 2 diabetes mellitus with hyperglycemia, without long-term current use of insulin (H)      spironolactone (ALDACTONE) 25 MG tablet Take 2 tablets (50 mg) by mouth every morning.  Qty: 180 tablet, Refills: 3    Associated Diagnoses: Bilateral lower extremity edema      tiZANidine (ZANAFLEX) 4 MG tablet Take 1 tablet (4 mg) by mouth nightly as needed for muscle spasms.  Qty: 30 tablet, Refills:  "1    Associated Diagnoses: Acute right-sided thoracic back pain                   Consultations:   No consultations were requested during this admission            Discharge Exam:   Blood pressure 120/68, pulse 69, temperature 97.8  F (36.6  C), temperature source Oral, resp. rate 20, height 1.753 m (5' 9\"), weight (!) 159.3 kg (351 lb 3.1 oz), SpO2 (!) 91%.  GENERAL APPEARANCE: healthy, alert and no distress  EYES: conjunctiva clear, eyes grossly normal  HENT: external ears and nose normal   NECK: supple, no masses or adenopathy  RESP: lungs clear to auscultation - no rales, rhonchi or wheezes  CV: regular rate and rhythm, normal S1 S2, no S3 or S4 and no murmur, click or rub   ABDOMEN: soft, nontender, no HSM or masses and bowel sounds normal  MS: no clubbing, cyanosis; hard indurated edema-chronic likely  SKIN: clear without significant rashes or lesions  NEURO: Normal strength and tone, sensory exam grossly normal, mentation intact and speech normal    Unresulted Labs Ordered in the Past 30 Days of this Admission       Date and Time Order Name Status Description    7/2/2025  5:03 PM Surgical Pathology Exam In process             No results found for this or any previous visit (from the past 24 hours).         Pending Tests at Discharge:   None         Discharge Instructions and Follow-Up:     Discharge diet: Regular   Discharge activity: Activity as tolerated   Discharge follow-up: Follow up with primary care provider in 2 weeks           Discharge Disposition:     Discharged to home      Attestation:  I have reviewed today's vital signs, notes, medications, labs and imaging.    Time Spent on this Encounter   I, Freddy Ring MD, personally saw the patient today and spent greater than 30 minutes discharging this patient.    Freddy Ring MD       "

## 2025-07-06 NOTE — PROGRESS NOTES
Oxygen Documentation  I certify that this patient, Miguel Baca has been under my care (or a nurse practitioner or physican's assistant working with me). This is the face-to-face encounter for oxygen medical necessity.      At the time of this encounter, I have reviewed the qualifying testing and have determined that supplemental oxygen is reasonable and necessary and is expected to improve the patient's condition in a home setting.         Patient has continued oxygen desaturation due to acute hypoxic resp failure    If portability is ordered, is the patient mobile within the home? yes    Was this visit performed as a telehealth visit: No

## 2025-07-06 NOTE — PROGRESS NOTES
"End Of Shift Note        Neuro: Aox4. HA this morning. Tylenol helped it improve. No dizziness even when walking. 1 assist with walker and belt.      Cardiac: T max 98.2 today. No chills or diaphoresis. SBP's 's. HR in the 60's. His legs are significantly edematous. Was walking and up in the chair for most of the day though- recommended he put them up for the rest of the evening. He took lasix this morning. Discussed following up with his primary and cardiologist as well.  /54 (BP Location: Left arm)   Pulse 63   Temp 98.2  F (36.8  C) (Oral)   Resp 20   Ht 1.753 m (5' 9\")   Wt (!) 159.3 kg (351 lb 3.1 oz)   SpO2 95%   BMI 51.86 kg/m         Resp: Lungs dim on clear. Sats in the low 90's at rest. With activity/talking he dips to 88-89%. With activity down to 85%. See home O2 note. He qualified for Home O2 which was brought here and he will take home and use with activity or sx at least until follow up with primary. Dr. Ring also recommended a sleep study which will help him work through his O2 needs at night. For now we are recommending the home o2 for activity and as needed at rest for symptoms. He does have a pulse ox at home.      GI/: Abdomen was rounded, firm, and had upper abdominal discomfort and no appetite. Bowel sounds present, passing gas and belching frequently. Finally had a BM this morning around 1100 after prune juice, senna, and merari lax at 0800. Because he went he then refused the mag citrate and extra order of senna. States he feels better now. We discussed planning on continuing with a BM regimen that is similar along with diet changes so that he goes at least every 2 days.     Voiding frequently due to the lasix. Denies any pain, burning, or trouble starting/stopping.      MSK: Generalized weakness. Trouble bending and reaching some things.      Skin: Shiv, purplish red and edematous legs. Dry calloused skin.      LDAs: PIV CDI.         0  "

## 2025-07-06 NOTE — PROGRESS NOTES
Patient has been assessed for Home Oxygen needs. Oxygen readings:    *Pulse oximetry (SpO2) = 88-92% on room air at rest while awake.    *SpO2 improved to 95% on 2 liters/minute at rest.    *SpO2 = 85% on room air during activity/with exercise.    *SpO2 improved to 89 % on 1 liters/minute during activity/with exercise. SpO2 improved to 91% on 2 L with activity.

## 2025-07-06 NOTE — PROGRESS NOTES
[facility] DISCHARGE NOTE    Patient discharged to home at 2:56 PM via wheel chair. Accompanied by sister and staff. Discharge instructions reviewed with patient, opportunity offered to ask questions. Prescriptions sent to patients preferred pharmacy. Home oxygen arrived and instructions reviewed. All belongings sent with patient. His sister is his ride and helped him transport.     Ashley Nicole RN

## 2025-07-07 ENCOUNTER — PATIENT OUTREACH (OUTPATIENT)
Dept: CARE COORDINATION | Facility: CLINIC | Age: 70
End: 2025-07-07
Payer: MEDICARE

## 2025-07-08 NOTE — ANESTHESIA POSTPROCEDURE EVALUATION
Patient: Miguel Baca    Procedure: Procedure(s):  APPENDECTOMY, LAPAROSCOPIC with drain placement       Anesthesia Type:  No value filed.    Note:  Disposition: Outpatient   Postop Pain Control: Uneventful            Sign Out: Well controlled pain   PONV: No   Neuro/Psych: Uneventful            Sign Out: Acceptable/Baseline neuro status   Airway/Respiratory: Uneventful            Sign Out: Acceptable/Baseline resp. status   CV/Hemodynamics: Uneventful            Sign Out: Acceptable CV status; No obvious hypovolemia; No obvious fluid overload   Other NRE:    DID A NON-ROUTINE EVENT OCCUR?        Last vitals:  Vitals Value Taken Time   /64 07/02/25 18:45   Temp 37.9  C (100.2  F) 07/02/25 17:48   Pulse 81 07/02/25 18:45   Resp 16 07/02/25 18:45   SpO2 92 % 07/02/25 18:52       Electronically Signed By: HILDA Lacey CRNA  July 8, 2025  10:46 AM

## 2025-07-09 ENCOUNTER — PATIENT OUTREACH (OUTPATIENT)
Dept: CARE COORDINATION | Facility: CLINIC | Age: 70
End: 2025-07-09
Payer: MEDICARE

## 2025-07-15 ENCOUNTER — OFFICE VISIT (OUTPATIENT)
Dept: DERMATOLOGY | Facility: CLINIC | Age: 70
End: 2025-07-15
Attending: DERMATOLOGY
Payer: MEDICARE

## 2025-07-15 ENCOUNTER — OFFICE VISIT (OUTPATIENT)
Dept: SURGERY | Facility: CLINIC | Age: 70
End: 2025-07-15
Payer: MEDICARE

## 2025-07-15 VITALS
DIASTOLIC BLOOD PRESSURE: 68 MMHG | HEIGHT: 69 IN | OXYGEN SATURATION: 92 % | WEIGHT: 315 LBS | HEART RATE: 65 BPM | BODY MASS INDEX: 46.65 KG/M2 | SYSTOLIC BLOOD PRESSURE: 112 MMHG

## 2025-07-15 DIAGNOSIS — C44.319 BASAL CELL CARCINOMA (BCC) OF JAWLINE: ICD-10-CM

## 2025-07-15 DIAGNOSIS — C44.319 BASAL CELL CARCINOMA (BCC) OF RIGHT CHEEK: ICD-10-CM

## 2025-07-15 DIAGNOSIS — K35.32 ACUTE APPENDICITIS WITH PERFORATION AND LOCALIZED PERITONITIS, WITHOUT ABSCESS OR GANGRENE: Primary | ICD-10-CM

## 2025-07-15 PROCEDURE — 13132 CMPLX RPR F/C/C/M/N/AX/G/H/F: CPT | Mod: 79 | Performed by: DERMATOLOGY

## 2025-07-15 PROCEDURE — 17311 MOHS 1 STAGE H/N/HF/G: CPT | Mod: 79 | Performed by: DERMATOLOGY

## 2025-07-15 ASSESSMENT — PAIN SCALES - GENERAL: PAINLEVEL_OUTOF10: MILD PAIN (2)

## 2025-07-15 NOTE — PROGRESS NOTES
Miguel Baca is an extremely pleasant 69 year old year old male patient here today for evaluation and managment of basal cell carcinoma on right cheek and jawline.  Patient has no other skin complaints today.  Remainder of the HPI, Meds, PMH, Allergies, FH, and SH was reviewed in chart.      Past Medical History:   Diagnosis Date    Acute chest pain 12/22/2015    Acute combined systolic and diastolic congestive heart failure (H)     Basal cell carcinoma 07/15/2025    Right Cheek    Cellulitis and abscess of unspecified site     Created by Conversion     Colon polyps     Cough     Created by Conversion     Hypertension     Morbid obesity (H)     Primary osteoarthritis of both knees     Pulmonary embolism (H)     Skin cancer     Thrombocytopenia     Urethral stricture        Past Surgical History:   Procedure Laterality Date    ARTHROSCOPY KNEE Left 2012    CYSTOSCOPY, DILATE URETHRA, COMBINED N/A 9/10/2024    Procedure: CYSTOSCOPY, URETHRAL DILATION;  Surgeon: Felix Lloyd MD;  Location: [facility] OR    LAPAROSCOPIC APPENDECTOMY N/A 7/2/2025    Procedure: APPENDECTOMY, LAPAROSCOPIC with drain placement;  Surgeon: Keshia Pineda MD;  Location: [facility] OR    RETROGRADE URETHROGRAM N/A 9/10/2024    Procedure: RETROGRADE URETHROGRAM, OPTILUME BALLOON URETHRAL DILATION;  Surgeon: Felix Lloyd MD;  Location: [facility] OR        Family History   Problem Relation Age of Onset    Coronary Artery Disease Father         s/p stents, CABG    Coronary Artery Disease Brother         s/p stents    Cancer Mother     Parkinsonism Paternal Grandmother     Diabetes Type 2  Paternal Grandmother        Social History     Socioeconomic History    Marital status: Single     Spouse name: Not on file    Number of children: Not on file    Years of education: Not on file    Highest education level: Not on file   Occupational History    Not on file   Tobacco Use    Smoking status: Former     Types: Cigarettes    Smokeless  tobacco: Never   Vaping Use    Vaping status: Never Used   Substance and Sexual Activity    Alcohol use: Not on file    Drug use: Not on file    Sexual activity: Not on file   Other Topics Concern    Not on file   Social History Narrative    Lives independently near [facility], [place] Works in [place] so rents a room from a karlene during the work week     Social Drivers of Health     Financial Resource Strain: Low Risk  (7/1/2025)    Financial Resource Strain     Within the past 12 months, have you or your family members you live with been unable to get utilities (heat, electricity) when it was really needed?: No   Food Insecurity: Low Risk  (7/1/2025)    Food Insecurity     Within the past 12 months, did you worry that your food would run out before you got money to buy more?: No     Within the past 12 months, did the food you bought just not last and you didn t have money to get more?: No   Transportation Needs: Low Risk  (7/1/2025)    Transportation Needs     Within the past 12 months, has lack of transportation kept you from medical appointments, getting your medicines, non-medical meetings or appointments, work, or from getting things that you need?: No   Physical Activity: Not on file   Stress: Not on file   Social Connections: Socially Integrated (10/30/2023)    [facility]    Social Connections     Frequency of Communication with Friends and Family: 0   Interpersonal Safety: Low Risk  (7/1/2025)    Interpersonal Safety     Do you feel physically and emotionally safe where you currently live?: Yes     Within the past 12 months, have you been hit, slapped, kicked or otherwise physically hurt by someone?: No     Within the past 12 months, have you been humiliated or emotionally abused in other ways by your partner or ex-partner?: No   Housing Stability: Low Risk  (7/1/2025)    Housing Stability     Do you have housing? : Yes     Are you worried about  losing your housing?: No       Outpatient Encounter Medications as of 7/15/2025   Medication Sig Dispense Refill    apixaban ANTICOAGULANT (ELIQUIS ANTICOAGULANT) 5 MG tablet Take 1 tablet (5 mg) by mouth 2 times daily. 180 tablet 3    atenolol (TENORMIN) 25 MG tablet Take 1 tablet (25 mg) by mouth daily. 90 tablet 3    bumetanide (BUMEX) 1 MG tablet Take 1 tablet (1 mg) by mouth 2 times daily. 180 tablet 3    metFORMIN (GLUCOPHAGE XR) 500 MG 24 hr tablet Take 1 tablet (500 mg) by mouth daily (with dinner). 180 tablet 1    spironolactone (ALDACTONE) 25 MG tablet Take 2 tablets (50 mg) by mouth every morning. 180 tablet 3    tiZANidine (ZANAFLEX) 4 MG tablet Take 1 tablet (4 mg) by mouth nightly as needed for muscle spasms. 30 tablet 1     No facility-administered encounter medications on file as of 7/15/2025.             O:   NAD, WDWN, Alert & Oriented, Mood & Affect wnl, Vitals stable   General appearance normal   Vitals stable   Alert, oriented and in no acute distress     R jawline ulcerated plaque  R cheek 5mm pink pearly papule       Eyes: Conjunctivae/lids:Normal     ENT: Lips, mucosa: normal    MSK:Normal    Cardiovascular: peripheral edema none    Pulm: Breathing Normal    Neuro/Psych: Orientation:Alert and Orientedx3 ; Mood/Affect:normal       A/P:  R cheek basal cell carcinoma   MOHS:   Location    The rationale for Mohs surgery was discussed with the patient and consent was obtained.  The risks and benefits as well as alternatives to therapy were discussed, in detail.  Specifically, the risks of infection, scarring, bleeding, prolonged wound healing, incomplete removal, allergy to anesthesia, nerve injury and recurrence were addressed.  Indication for Mohs was Location. Prior to the procedure, the treatment site was clearly identified and, if available, confirmed with previous photos and confirmed by the patient   All components of the Universal Protocol/PAUSE rule were completed.  The Mohs surgeon  operated in two distinct and integrated capacities as the surgeon and pathologist.      The area was prepped with Betasept.  A rim of normal appearing skin was marked circumferentially around the lesion.  The area was infiltrated with local anesthesia.  The tumor was first debulked to remove all clinically apparent tumor.  An incision following the standard Mohs approach was done and the specimen was oriented,mapped and placed in 1 block(s).  Each specimen was then chromacoded and processed in the Mohs laboratory using standard Mohs technique and submitted for frozen section histology.  Frozen section analysis showed no residual tumor but CLEAR MARGINS.      The tumor was excised using standard Mohs technique in 1 stages(s).  CLEAR MARGINS OBTAINED and Final defect size was 1cm.     We discussed the options for wound management in full with the patient including risks/benefits/ possible outcomes.      REPAIR COMPLEX: Because of the tightness of the surrounding skin and Because of the size and full thickness nature of the defect, Because of the tightness of the surrounding skin, To maintain form and function, In order to avoid distortion, and Because of the proximity to the lid, a complex closure was planned. After LE anesthesia and prep, Burow's triangles were excised in the relaxed skin tension lines. The wound edges were widely undermined greater than width of the defect on both sides by dissection in the subcutaneous plane until adequate tissue mobility was obtained. Hemostasis was obtained. The wound edges were closed in a layered fashion using Vicryl and Fast Absorbing Plain Gut sutures. Postoperative length was 2.6 cm.   EBL minimal; complications none; wound care routine.  The patient was discharged in good condition and will return in one week for wound evaluation.  2. R jawline basal cell carcinoma next appt  It was a pleasure speaking to Miguel Baca today.  Previous clinic notes and pertinent laboratory  tests were reviewed prior to Miguel Baca's visit.  Signs and Symptoms of skin cancer discussed with patient.  Patient encouraged to perform monthly skin exams.  UV precautions reviewed with patient.  Risks of non-melanoma skin cancer discussed with patient   Return to clinic next appt

## 2025-07-15 NOTE — LETTER
7/15/2025      Miguel Baca  [address]  [place] [address]      Dear Colleague,    Thank you for referring your patient, Miguel Baca, to the [facility]. Please see a copy of my visit note below.    Miguel Baca is an extremely pleasant 69 year old year old male patient here today for evaluation and managment of basal cell carcinoma on right cheek and jawline.  Patient has no other skin complaints today.  Remainder of the HPI, Meds, PMH, Allergies, FH, and SH was reviewed in chart.      Past Medical History:   Diagnosis Date     Acute chest pain 12/22/2015     Acute combined systolic and diastolic congestive heart failure (H)      Basal cell carcinoma 07/15/2025    Right Cheek     Cellulitis and abscess of unspecified site     Created by Conversion      Colon polyps      Cough     Created by Conversion      Hypertension      Morbid obesity (H)      Primary osteoarthritis of both knees      Pulmonary embolism (H)      Skin cancer      Thrombocytopenia      Urethral stricture        Past Surgical History:   Procedure Laterality Date     ARTHROSCOPY KNEE Left 2012     CYSTOSCOPY, DILATE URETHRA, COMBINED N/A 9/10/2024    Procedure: CYSTOSCOPY, URETHRAL DILATION;  Surgeon: Felix Lloyd MD;  Location: [facility] OR     LAPAROSCOPIC APPENDECTOMY N/A 7/2/2025    Procedure: APPENDECTOMY, LAPAROSCOPIC with drain placement;  Surgeon: Keshia Pineda MD;  Location: [facility] OR     RETROGRADE URETHROGRAM N/A 9/10/2024    Procedure: RETROGRADE URETHROGRAM, OPTILUME BALLOON URETHRAL DILATION;  Surgeon: Felix Lloyd MD;  Location: [facility] OR        Family History   Problem Relation Age of Onset     Coronary Artery Disease Father         s/p stents, CABG     Coronary Artery Disease Brother         s/p stents     Cancer Mother      Parkinsonism Paternal Grandmother      Diabetes Type 2  Paternal Grandmother        Social History     Socioeconomic History     Marital  status: Single     Spouse name: Not on file     Number of children: Not on file     Years of education: Not on file     Highest education level: Not on file   Occupational History     Not on file   Tobacco Use     Smoking status: Former     Types: Cigarettes     Smokeless tobacco: Never   Vaping Use     Vaping status: Never Used   Substance and Sexual Activity     Alcohol use: Not on file     Drug use: Not on file     Sexual activity: Not on file   Other Topics Concern     Not on file   Social History Narrative    Lives independently near [facility], [place] Works in [place] so rents a room from a karlene during the work week     Social Drivers of Health     Financial Resource Strain: Low Risk  (7/1/2025)    Financial Resource Strain      Within the past 12 months, have you or your family members you live with been unable to get utilities (heat, electricity) when it was really needed?: No   Food Insecurity: Low Risk  (7/1/2025)    Food Insecurity      Within the past 12 months, did you worry that your food would run out before you got money to buy more?: No      Within the past 12 months, did the food you bought just not last and you didn t have money to get more?: No   Transportation Needs: Low Risk  (7/1/2025)    Transportation Needs      Within the past 12 months, has lack of transportation kept you from medical appointments, getting your medicines, non-medical meetings or appointments, work, or from getting things that you need?: No   Physical Activity: Not on file   Stress: Not on file   Social Connections: Socially Integrated (10/30/2023)    [facility]    Social Connections      Frequency of Communication with Friends and Family: 0   Interpersonal Safety: Low Risk  (7/1/2025)    Interpersonal Safety      Do you feel physically and emotionally safe where you currently live?: Yes      Within the past 12 months, have you been hit, slapped, kicked or  otherwise physically hurt by someone?: No      Within the past 12 months, have you been humiliated or emotionally abused in other ways by your partner or ex-partner?: No   Housing Stability: Low Risk  (7/1/2025)    Housing Stability      Do you have housing? : Yes      Are you worried about losing your housing?: No       Outpatient Encounter Medications as of 7/15/2025   Medication Sig Dispense Refill     apixaban ANTICOAGULANT (ELIQUIS ANTICOAGULANT) 5 MG tablet Take 1 tablet (5 mg) by mouth 2 times daily. 180 tablet 3     atenolol (TENORMIN) 25 MG tablet Take 1 tablet (25 mg) by mouth daily. 90 tablet 3     bumetanide (BUMEX) 1 MG tablet Take 1 tablet (1 mg) by mouth 2 times daily. 180 tablet 3     metFORMIN (GLUCOPHAGE XR) 500 MG 24 hr tablet Take 1 tablet (500 mg) by mouth daily (with dinner). 180 tablet 1     spironolactone (ALDACTONE) 25 MG tablet Take 2 tablets (50 mg) by mouth every morning. 180 tablet 3     tiZANidine (ZANAFLEX) 4 MG tablet Take 1 tablet (4 mg) by mouth nightly as needed for muscle spasms. 30 tablet 1     No facility-administered encounter medications on file as of 7/15/2025.             O:   NAD, WDWN, Alert & Oriented, Mood & Affect wnl, Vitals stable   General appearance normal   Vitals stable   Alert, oriented and in no acute distress     R jawline ulcerated plaque  R cheek 5mm pink pearly papule       Eyes: Conjunctivae/lids:Normal     ENT: Lips, mucosa: normal    MSK:Normal    Cardiovascular: peripheral edema none    Pulm: Breathing Normal    Neuro/Psych: Orientation:Alert and Orientedx3 ; Mood/Affect:normal       A/P:  R cheek basal cell carcinoma   MOHS:   Location    The rationale for Mohs surgery was discussed with the patient and consent was obtained.  The risks and benefits as well as alternatives to therapy were discussed, in detail.  Specifically, the risks of infection, scarring, bleeding, prolonged wound healing, incomplete removal, allergy to anesthesia, nerve injury and  recurrence were addressed.  Indication for Mohs was Location. Prior to the procedure, the treatment site was clearly identified and, if available, confirmed with previous photos and confirmed by the patient   All components of the Universal Protocol/PAUSE rule were completed.  The Mohs surgeon operated in two distinct and integrated capacities as the surgeon and pathologist.      The area was prepped with Betasept.  A rim of normal appearing skin was marked circumferentially around the lesion.  The area was infiltrated with local anesthesia.  The tumor was first debulked to remove all clinically apparent tumor.  An incision following the standard Mohs approach was done and the specimen was oriented,mapped and placed in 1 block(s).  Each specimen was then chromacoded and processed in the Mohs laboratory using standard Mohs technique and submitted for frozen section histology.  Frozen section analysis showed no residual tumor but CLEAR MARGINS.      The tumor was excised using standard Mohs technique in 1 stages(s).  CLEAR MARGINS OBTAINED and Final defect size was 1cm.     We discussed the options for wound management in full with the patient including risks/benefits/ possible outcomes.      REPAIR COMPLEX: Because of the tightness of the surrounding skin and Because of the size and full thickness nature of the defect, Because of the tightness of the surrounding skin, To maintain form and function, In order to avoid distortion, and Because of the proximity to the lid, a complex closure was planned. After LE anesthesia and prep, Burow's triangles were excised in the relaxed skin tension lines. The wound edges were widely undermined greater than width of the defect on both sides by dissection in the subcutaneous plane until adequate tissue mobility was obtained. Hemostasis was obtained. The wound edges were closed in a layered fashion using Vicryl and Fast Absorbing Plain Gut sutures. Postoperative length was 2.6 cm.    EBL minimal; complications none; wound care routine.  The patient was discharged in good condition and will return in one week for wound evaluation.  2. R jawline basal cell carcinoma next appt  It was a pleasure speaking to Miguel Baca today.  Previous clinic notes and pertinent laboratory tests were reviewed prior to Miguel Baca's visit.  Signs and Symptoms of skin cancer discussed with patient.  Patient encouraged to perform monthly skin exams.  UV precautions reviewed with patient.  Risks of non-melanoma skin cancer discussed with patient   Return to clinic next appt      Again, thank you for allowing me to participate in the care of your patient.        Sincerely,        Dougie Cazares MD    Electronically signed

## 2025-07-15 NOTE — LETTER
"7/15/2025      Miguel Baca  [address]  [place] [address]      Dear Colleague,    Thank you for referring your patient, Miguel Baca, to the [facility]. Please see a copy of my visit note below.    Surgery Post-op Note  [facility]  [address] [place] [address]  T: [phone]  F: [phone]    Subjective:     Miguel Baca presents to the clinic after undergoing laparoscopic appendectomy with drain placement for acute appendicitis  with perforation. He states he is now doing better. He came from a dermatology appointment so he has a bandage for this. He has not had any more abdominal pain. He has not had any fevers or chills. He has not had any nausea or vomiting. He is not having any issues with his incisions.         Objective:     Vitals:   /68 (BP Location: Left arm, Patient Position: Sitting, Cuff Size: Adult Large)   Pulse 65   Ht 1.753 m (5' 9\")   Wt (!) 155.6 kg (343 lb)   SpO2 92%   BMI 50.65 kg/m     General Appearance:    Miguel is a well-appearing 69 year  male who is in no acute distress.   Cardiac:    Skin is warm and dry     Pulmonary:   Regular rate, rhythm   Abdomen:    Soft, obese abdomen. Incisions are c/d/I. No  RLQ tenderness to palpation, no peritoneal signs.    Incision: The incision site is healing  well. There are no signs of cellulitis or drainage.        Labs/Imaging:     None       Pathology:     Final Diagnosis   APPENDIX, APPENDECTOMY:  - PERFORATED ACUTE APPENDICITIS         Assessment:     Mr.Harold SAMRA Baca is status post laparoscopic appendectomy, doing well .        Plan:     1. The patient is instructed to call the office if there is any increasing incisional pain, swelling, redness, drainage, or any other problems.   2. Continue lifting restrictions until 6 weeks from surgery, do not lift more than 20lbs  3. If fevers, chills, abdominal pain or other concerning symptoms, return to " clinic, urgent care or ED   4. Recommend colonoscopy in 6-8 weeks     Again, thank you for allowing me to participate in the care of your patient.        Sincerely,        MICHEL REYES PA-C    Electronically signed

## 2025-07-15 NOTE — NURSING NOTE
"Chief Complaint   Patient presents with    Post-Op - General Surgery     Doing well, having some swelling in the feet, leg swelling has gone down a bit       Vitals:    07/15/25 0921   BP: 112/68   BP Location: Left arm   Patient Position: Sitting   Cuff Size: Adult Large   Pulse: 65   SpO2: 92%   Weight: (!) 155.6 kg (343 lb)   Height: 1.753 m (5' 9\")     Wt Readings from Last 1 Encounters:   07/15/25 (!) 155.6 kg (343 lb)       Lizett KUO CMA.................7/15/2025    "

## 2025-07-15 NOTE — PATIENT INSTRUCTIONS
Sutured Wound Care  RIGHT CHEEK    [facility] [phone]        No strenuous activity for 48 hours. Resume moderate activity in 48 hours. No heavy exercising until you are seen for follow up in one week.     Take Tylenol as needed for discomfort.                         Do not drink alcoholic beverages for 48 hours.     Keep the pressure bandage in place for 24 hours. If the bandage becomes blood tinged or loose, reinforce it with gauze and tape.        (Refer to the reverse side of this page for management of bleeding).    Remove pressure bandage in 24 hours     Leave the flat bandage in place until your follow up appointment.    Keep the bandage dry. Wash around it carefully.    If the tape becomes soiled or starts to come off, reinforce it with additional paper tape.    Do not smoke for 3 weeks; smoking is detrimental to wound healing.    It is normal to have swelling and bruising around the surgical site. The bruising will fade in approximately 10-14 days. Elevate the area to reduce swelling.    Numbness, itchiness and sensitivity to temperature changes can occur after surgery and may take up to 18 months to normalize.      POSSIBLE COMPLICATIONS    BLEEDING:    Leave the bandage in place.  Use tightly rolled up gauze or a cloth to apply direct pressure over the bandage for 20   minutes.  Reapply pressure for an additional 20 minutes if necessary  Call the office or go to the nearest emergency room if pressure fails to stop the bleeding.  Use additional gauze and tape to maintain pressure once the bleeding has stopped.        PAIN:    Post operative pain should slowly get better, never worse.  A severe increase in pain may indicate a problem. Call the office if this occurs.    In case of emergency phone:Dr Cazares [phone]           Proper skin care from [facility]:    -Eliminate harsh soaps as they strip the natural oils from the skin, often resulting  in dry itchy skin ( i.e. Dial, Zest, [place])  -Use mild soaps such as Cetaphil or Dove Sensitive Skin in the shower. You do not need to use soap on arms, legs, and trunk every time you shower unless visibly soiled.   -Avoid hot or cold showers.  -After showering, lightly (pat) dry off and apply moisturizing within 2-3 minutes. This will help trap moisture in the skin.   -Aggressive use of a moisturizer at least 1-2 times a day to the entire body (including -Vanicream, Cetaphil, Aquaphor or Cerave) and moisturize hands after every washing.  -We recommend using moisturizers that come in a tub that needs to be scooped out, not a pump. This has more of an oil base. It will hold moisture in your skin much better than a water base moisturizer. The above recommended are non-pore clogging.      Wear a sunscreen with at least SPF 30 on your face, ears, neck and V of the chest daily. Wear sunscreen on other areas of the body if those areas are exposed to the sun throughout the day. Sunscreens can contain physical and/or chemical blockers. Physical blockers are less likely to clog pores, these include zinc oxide and titanium dioxide. Reapply every two hour and after swimming.     Sunscreen examples: https://www.ewg.org/sunscreen/    UV radiation  UVA radiation remains constant throughout the day and throughout the year. It is a longer wavelength than UVB and therefore penetrates deeper into the skin leading to immediate and delayed tanning, photoaging, and skin cancer. 70-80% of UVA and UVB radiation occurs between the hours of 10am-2pm.  UVB radiation  UVB radiation causes the most harmful effects and is more significant during the summer months. However, snow and ice can reflect UVB radiation leading to skin damage during the winter months as well. UVB radiation is responsible for tanning, burning, inflammation, delayed erythema (pinkness), pigmentation (brown spots), and skin cancer.     I recommend self monthly full  body exams and yearly full body exams with a dermatology provider. If you develop a new or changing lesion please follow up for examination. Most skin cancers are pink and scaly or pink and pearly. However, we do see blue/brown/black skin cancers.  Consider the ABCDEs of melanoma when giving yourself your monthly full body exam ( don't forget the groin, buttocks, feet, toes, etc). A-asymmetry, B-borders, C-color, D-diameter, E-elevation or evolving. If you see any of these changes please follow up in clinic. If you cannot see your back I recommend purchasing a hand held mirror to use with a larger wall mirror.       Checking for Skin Cancer  You can find cancer early by checking your skin each month. There are 3 kinds of skin cancer. They are melanoma, basal cell carcinoma, and squamous cell carcinoma. Doing monthly skin checks is the best way to find new marks or skin changes. Follow the instructions below for checking your skin.   The ABCDEs of checking moles for melanoma   Check your moles or growths for signs of melanoma using ABCDE:   Asymmetry: the sides of the mole or growth don t match  Border: the edges are ragged, notched, or blurred  Color: the color within the mole or growth varies  Diameter: the mole or growth is larger than 6 mm (size of a pencil eraser)  Evolving: the size, shape, or color of the mole or growth is changing (evolving is not shown in the images below)    Checking for other types of skin cancer  Basal cell carcinoma or squamous cell carcinoma have symptoms such as:     A spot or mole that looks different from all other marks on your skin  Changes in how an area feels, such as itching, tenderness, or pain  Changes in the skin's surface, such as oozing, bleeding, or scaliness  A sore that does not heal  New swelling or redness beyond the border of a mole    Who s at risk?  Anyone can get skin cancer. But you are at greater risk if you have:   Fair skin, light-colored hair, or light-colored  eyes  Many moles or abnormal moles on your skin  A history of sunburns from sunlight or tanning beds  A family history of skin cancer  A history of exposure to radiation or chemicals  A weakened immune system  If you have had skin cancer in the past, you are at risk for recurring skin cancer.   How to check your skin  Do your monthly skin checkups in front of a full-length mirror. Check all parts of your body, including your:   Head (ears, face, neck, and scalp)  Torso (front, back, and sides)  Arms (tops, undersides, upper, and lower armpits)  Hands (palms, backs, and fingers, including under the nails)  Buttocks and genitals  Legs (front, back, and sides)  Feet (tops, soles, toes, including under the nails, and between toes)  If you have a lot of moles, take digital photos of them each month. Make sure to take photos both up close and from a distance. These can help you see if any moles change over time.   Most skin changes are not cancer. But if you see any changes in your skin, call your doctor right away. Only he or she can diagnose a problem. If you have skin cancer, seeing your doctor can be the first step toward getting the treatment that could save your life.   [employer] last reviewed this educational content on 4/1/2019 2000-2020 [employer]. [address] [place] [address]. All rights reserved. This information is not intended as a substitute for professional medical care. Always follow your healthcare professional's instructions.       When should I call my doctor?  If you are worsening or not improving, please, contact us or seek urgent care as noted below.     Who should I call with questions (adults)?    [facility] [phone]  For urgent needs outside of business hours call the [facility] at [phone] and ask for the dermatology resident on call to be paged  If this is a medical emergency and you are unable to reach an ER, Call  [phone]      If you need a prescription refill, please contact your pharmacy. Refills are approved or denied by our Physicians during normal business hours, Monday through Friday.  Per office policy, refills will not be granted if you have not been seen within the past year (or sooner depending on the condition).

## 2025-07-16 LAB
PATH REPORT.COMMENTS IMP SPEC: NORMAL
PATH REPORT.COMMENTS IMP SPEC: NORMAL
PATH REPORT.FINAL DX SPEC: NORMAL
PATH REPORT.GROSS SPEC: NORMAL
PATH REPORT.MICROSCOPIC SPEC OTHER STN: NORMAL
PATH REPORT.RELEVANT HX SPEC: NORMAL
PHOTO IMAGE: NORMAL

## 2025-07-16 PROCEDURE — 88304 TISSUE EXAM BY PATHOLOGIST: CPT | Mod: 26 | Performed by: PATHOLOGY

## 2025-07-23 ENCOUNTER — OFFICE VISIT (OUTPATIENT)
Dept: FAMILY MEDICINE | Facility: CLINIC | Age: 70
End: 2025-07-23
Payer: MEDICARE

## 2025-07-23 ENCOUNTER — PATIENT OUTREACH (OUTPATIENT)
Dept: CARE COORDINATION | Facility: CLINIC | Age: 70
End: 2025-07-23

## 2025-07-23 VITALS
HEART RATE: 61 BPM | DIASTOLIC BLOOD PRESSURE: 79 MMHG | OXYGEN SATURATION: 92 % | BODY MASS INDEX: 49.44 KG/M2 | HEIGHT: 67 IN | WEIGHT: 315 LBS | RESPIRATION RATE: 16 BRPM | TEMPERATURE: 97.5 F | SYSTOLIC BLOOD PRESSURE: 120 MMHG

## 2025-07-23 DIAGNOSIS — Z09 HOSPITAL DISCHARGE FOLLOW-UP: ICD-10-CM

## 2025-07-23 DIAGNOSIS — R60.0 BILATERAL LOWER EXTREMITY EDEMA: ICD-10-CM

## 2025-07-23 DIAGNOSIS — K35.32 ACUTE APPENDICITIS WITH PERFORATION AND LOCALIZED PERITONITIS, WITHOUT ABSCESS OR GANGRENE: Primary | ICD-10-CM

## 2025-07-23 LAB
ANION GAP SERPL CALCULATED.3IONS-SCNC: 10 MMOL/L (ref 7–15)
BUN SERPL-MCNC: 12.7 MG/DL (ref 8–23)
CALCIUM SERPL-MCNC: 9.4 MG/DL (ref 8.8–10.4)
CHLORIDE SERPL-SCNC: 97 MMOL/L (ref 98–107)
CREAT SERPL-MCNC: 0.88 MG/DL (ref 0.67–1.17)
EGFRCR SERPLBLD CKD-EPI 2021: >90 ML/MIN/1.73M2
GLUCOSE SERPL-MCNC: 97 MG/DL (ref 70–99)
HCO3 SERPL-SCNC: 33 MMOL/L (ref 22–29)
POTASSIUM SERPL-SCNC: 4.7 MMOL/L (ref 3.4–5.3)
SODIUM SERPL-SCNC: 140 MMOL/L (ref 135–145)

## 2025-07-23 PROCEDURE — 3074F SYST BP LT 130 MM HG: CPT | Performed by: FAMILY MEDICINE

## 2025-07-23 PROCEDURE — 99214 OFFICE O/P EST MOD 30 MIN: CPT | Performed by: FAMILY MEDICINE

## 2025-07-23 PROCEDURE — 3078F DIAST BP <80 MM HG: CPT | Performed by: FAMILY MEDICINE

## 2025-07-23 PROCEDURE — 36415 COLL VENOUS BLD VENIPUNCTURE: CPT | Performed by: FAMILY MEDICINE

## 2025-07-23 PROCEDURE — 80048 BASIC METABOLIC PNL TOTAL CA: CPT | Performed by: FAMILY MEDICINE

## 2025-07-23 RX ORDER — BUMETANIDE 1 MG/1
TABLET ORAL
Qty: 270 TABLET | Refills: 0 | Status: SHIPPED | OUTPATIENT
Start: 2025-07-23

## 2025-07-23 NOTE — PROGRESS NOTES
"  {PROVIDER CHARTING PREFERENCE:387309}    Subjective   Malachi is a 69 year old, presenting for the following health issues:  RECHECK      7/23/2025     8:33 AM   Additional Questions   Roomed by Kisha   Accompanied by self     Via the Health Maintenance questionnaire, the patient has reported the following services have been completed -Colonscopy: [employer] 2020-07-01, this information has been sent to the abstraction team.  Now s/p appendectomy - [place], [place], [facility]  \"It really hurt\"    Legs are swollen up  On Bumetanide BID and Spironolactone 25 mg , 2 tabs at bedtime    Antibiotics - still finishing     Sugars - borderline - on Metformin now    Will see Derm on 8/29 for removal of skin lesion right jaw    Needs sleep study -   Wants to do it at home   Nothing convenient     Has an oxygen machine at home - uses it at night  But that dries out his nose/throat and feels worse   Feels better without the oxygen machine         History of Present Illness       Reason for visit:  Swollen feet and legs   He is taking medications regularly.        {MA/LPN/RN Pre-Provider Visit Orders- hCG/UA/Strep (Optional):001973}  {SUPERLIST (Optional):662402}  {additonal problems for provider to add (Optional):994685}    {ROS Picklists (Optional):350344}      Objective    /79 (BP Location: Left arm, Patient Position: Sitting, Cuff Size: Adult Large)   Pulse 61   Temp 97.5  F (36.4  C) (Temporal)   Resp 16   Ht 1.71 m (5' 7.32\")   Wt (!) 156 kg (344 lb)   SpO2 92%   BMI 53.36 kg/m    Body mass index is 53.36 kg/m .  Physical Exam   {Exam List (Optional):292925}    {Diagnostic Test Results (Optional):452610}        Signed Electronically by: MARLYN SAAVEDRA MD  {Email feedback regarding this note to [email or web address]   :798881}  " "cough and shortness of breath.    Cardiovascular:  Positive for leg swelling. Negative for chest pain and palpitations.   Gastrointestinal:  Positive for abdominal pain (improved). Negative for vomiting.   Genitourinary:  Negative for dysuria and hematuria.   Musculoskeletal:  Negative for arthralgias and back pain.   Skin:  Negative for color change and rash.   Neurological:  Negative for seizures and syncope.   All other systems reviewed and are negative.          Objective    /79 (BP Location: Left arm, Patient Position: Sitting, Cuff Size: Adult Large)   Pulse 61   Temp 97.5  F (36.4  C) (Temporal)   Resp 16   Ht 1.71 m (5' 7.32\")   Wt (!) 156 kg (344 lb)   SpO2 92%   BMI 53.36 kg/m    Body mass index is 53.36 kg/m .  Physical Exam  Vitals reviewed.   Constitutional:       General: He is not in acute distress.     Appearance: Normal appearance.   HENT:      Head: Normocephalic.      Right Ear: Tympanic membrane, ear canal and external ear normal.      Left Ear: Tympanic membrane, ear canal and external ear normal.      Nose: Nose normal.      Mouth/Throat:      Mouth: Mucous membranes are moist.      Pharynx: No posterior oropharyngeal erythema.   Eyes:      Extraocular Movements: Extraocular movements intact.      Conjunctiva/sclera: Conjunctivae normal.      Pupils: Pupils are equal, round, and reactive to light.   Cardiovascular:      Rate and Rhythm: Normal rate and regular rhythm.      Pulses: Normal pulses.      Heart sounds: Normal heart sounds. No murmur heard.  Pulmonary:      Effort: Pulmonary effort is normal.      Breath sounds: Normal breath sounds.   Abdominal:      Palpations: Abdomen is soft. There is no mass.      Tenderness: There is no abdominal tenderness (RLQ abdominal tenderness at sit of appendectomy). There is no guarding or rebound.   Musculoskeletal:         General: No deformity. Normal range of motion.      Cervical back: Normal range of motion and neck supple.      Right " lower leg: Edema present.      Left lower leg: Edema present.   Lymphadenopathy:      Cervical: No cervical adenopathy.   Skin:     General: Skin is warm and dry.   Neurological:      General: No focal deficit present.      Mental Status: He is alert and oriented to person, place, and time.   Psychiatric:         Mood and Affect: Mood normal.         Behavior: Behavior normal.            Results for orders placed or performed in visit on 07/23/25   Basic metabolic panel  (Ca, Cl, CO2, Creat, Gluc, K, Na, BUN)     Status: Abnormal   Result Value Ref Range    Sodium 140 135 - 145 mmol/L    Potassium 4.7 3.4 - 5.3 mmol/L    Chloride 97 (L) 98 - 107 mmol/L    Carbon Dioxide (CO2) 33 (H) 22 - 29 mmol/L    Anion Gap 10 7 - 15 mmol/L    Urea Nitrogen 12.7 8.0 - 23.0 mg/dL    Creatinine 0.88 0.67 - 1.17 mg/dL    GFR Estimate >90 >60 mL/min/1.73m2    Calcium 9.4 8.8 - 10.4 mg/dL    Glucose 97 70 - 99 mg/dL           Signed Electronically by: MARLYN SAAVEDRA MD

## 2025-07-23 NOTE — PROGRESS NOTES
"Prior to immunization administration, verified patients identity using patient s name and date of birth. Please see Immunization Activity for additional information.     Screening Questionnaire for Adult Immunization    Are you sick today?   No   Do you have allergies to medications, food, a vaccine component or latex?   No   Have you ever had a serious reaction after receiving a vaccination?   No   Do you have a long-term health problem with heart, lung, kidney, or metabolic disease (e.g., diabetes), asthma, a blood disorder, no spleen, complement component deficiency, a cochlear implant, or a spinal fluid leak?  Are you on long-term aspirin therapy?   No   Do you have cancer, leukemia, HIV/AIDS, or any other immune system problem?   No   Do you have a parent, brother, or sister with an immune system problem?   No   In the past 3 months, have you taken medications that affect  your immune system, such as prednisone, other steroids, or anticancer drugs; drugs for the treatment of rheumatoid arthritis, Crohn s disease, or psoriasis; or have you had radiation treatments?   No   Have you had a seizure, or a brain or other nervous system problem?   No   During the past year, have you received a transfusion of blood or blood    products, or been given immune (gamma) globulin or antiviral drug?   No   For women: Are you pregnant or is there a chance you could become       pregnant during the next month?   No   Have you received any vaccinations in the past 4 weeks?   No     Immunization questionnaire { :115152::\"answers were all negative.\"}      Patient instructed to remain in clinic for 15 minutes afterwards, and to report any adverse reactions.     Screening performed by Kisha Grijalva MA on 7/23/2025 at 8:39 AM.   Answers submitted by the patient for this visit:  General Questionnaire (Submitted on 7/23/2025)  Chief Complaint: Chronic problems general questions HPI Form  What is the reason for your visit today? : " swollen feet and legs  How many days per week do you miss taking your medication?: 0  Questionnaire about: Chronic problems general questions HPI Form (Submitted on 7/23/2025)  Chief Complaint: Chronic problems general questions HPI Form

## 2025-07-29 ENCOUNTER — OFFICE VISIT (OUTPATIENT)
Dept: DERMATOLOGY | Facility: CLINIC | Age: 70
End: 2025-07-29
Payer: MEDICARE

## 2025-07-29 DIAGNOSIS — C44.319 BASAL CELL CARCINOMA (BCC) OF JAWLINE: Primary | ICD-10-CM

## 2025-07-29 PROCEDURE — 13132 CMPLX RPR F/C/C/M/N/AX/G/H/F: CPT | Performed by: DERMATOLOGY

## 2025-07-29 PROCEDURE — 17311 MOHS 1 STAGE H/N/HF/G: CPT | Performed by: DERMATOLOGY

## 2025-07-29 NOTE — PROGRESS NOTES
Miguel Baca is an extremely pleasant 69 year old year old male patient here today for evaluation and managment of basal cell carcinoma on jawline, previous sites healing well.  Patient has no other skin complaints today.  Remainder of the HPI, Meds, PMH, Allergies, FH, and SH was reviewed in chart.      Past Medical History:   Diagnosis Date    Acute chest pain 12/22/2015    Acute combined systolic and diastolic congestive heart failure (H)     Basal cell carcinoma 07/15/2025    Right Cheek    Cellulitis and abscess of unspecified site     Created by Conversion     Colon polyps     Cough     Created by Conversion     Hypertension     Morbid obesity (H)     Primary osteoarthritis of both knees     Pulmonary embolism (H)     Skin cancer     Thrombocytopenia     Urethral stricture        Past Surgical History:   Procedure Laterality Date    ARTHROSCOPY KNEE Left 2012    CYSTOSCOPY, DILATE URETHRA, COMBINED N/A 9/10/2024    Procedure: CYSTOSCOPY, URETHRAL DILATION;  Surgeon: Felix Lloyd MD;  Location: [facility] OR    LAPAROSCOPIC APPENDECTOMY N/A 7/2/2025    Procedure: APPENDECTOMY, LAPAROSCOPIC with drain placement;  Surgeon: Keshia Pineda MD;  Location: [facility] OR    RETROGRADE URETHROGRAM N/A 9/10/2024    Procedure: RETROGRADE URETHROGRAM, OPTILUME BALLOON URETHRAL DILATION;  Surgeon: Felix Lloyd MD;  Location: [facility] OR        Family History   Problem Relation Age of Onset    Coronary Artery Disease Father         s/p stents, CABG    Coronary Artery Disease Brother         s/p stents    Cancer Mother     Parkinsonism Paternal Grandmother     Diabetes Type 2  Paternal Grandmother        Social History     Socioeconomic History    Marital status: Single     Spouse name: Not on file    Number of children: Not on file    Years of education: Not on file    Highest education level: Not on file   Occupational History    Not on file   Tobacco Use    Smoking status: Former     Types: Cigarettes     Smokeless tobacco: Never   Vaping Use    Vaping status: Never Used   Substance and Sexual Activity    Alcohol use: Never    Drug use: Never    Sexual activity: Not on file   Other Topics Concern    Not on file   Social History Narrative    Lives independently near [facility], [place] Works in [place] so rents a room from a karlene during the work week     Social Drivers of Health     Financial Resource Strain: Low Risk  (7/1/2025)    Financial Resource Strain     Within the past 12 months, have you or your family members you live with been unable to get utilities (heat, electricity) when it was really needed?: No   Food Insecurity: Low Risk  (7/1/2025)    Food Insecurity     Within the past 12 months, did you worry that your food would run out before you got money to buy more?: No     Within the past 12 months, did the food you bought just not last and you didn t have money to get more?: No   Transportation Needs: Low Risk  (7/1/2025)    Transportation Needs     Within the past 12 months, has lack of transportation kept you from medical appointments, getting your medicines, non-medical meetings or appointments, work, or from getting things that you need?: No   Physical Activity: Not on file   Stress: Not on file   Social Connections: Socially Integrated (10/30/2023)    [facility] [employer]    Social Connections     Frequency of Communication with Friends and Family: 0   Interpersonal Safety: Low Risk  (7/1/2025)    Interpersonal Safety     Do you feel physically and emotionally safe where you currently live?: Yes     Within the past 12 months, have you been hit, slapped, kicked or otherwise physically hurt by someone?: No     Within the past 12 months, have you been humiliated or emotionally abused in other ways by your partner or ex-partner?: No   Housing Stability: Low Risk  (7/1/2025)    Housing Stability     Do you have housing? : Yes     Are you worried about  losing your housing?: No       Outpatient Encounter Medications as of 7/29/2025   Medication Sig Dispense Refill    apixaban ANTICOAGULANT (ELIQUIS ANTICOAGULANT) 5 MG tablet Take 1 tablet (5 mg) by mouth 2 times daily. 180 tablet 3    atenolol (TENORMIN) 25 MG tablet Take 1 tablet (25 mg) by mouth daily. 90 tablet 3    bumetanide (BUMEX) 1 MG tablet Take 2 tablets (2 mg) by mouth every morning AND 1 tablet (1 mg) at bedtime. 270 tablet 0    metFORMIN (GLUCOPHAGE XR) 500 MG 24 hr tablet Take 1 tablet (500 mg) by mouth daily (with dinner). 180 tablet 1    spironolactone (ALDACTONE) 25 MG tablet Take 2 tablets (50 mg) by mouth every morning. 180 tablet 3    tiZANidine (ZANAFLEX) 4 MG tablet Take 1 tablet (4 mg) by mouth nightly as needed for muscle spasms. 30 tablet 1     No facility-administered encounter medications on file as of 7/29/2025.             O:   NAD, WDWN, Alert & Oriented, Mood & Affect wnl, Vitals stable   General appearance normal   Vitals stable   Alert, oriented and in no acute distress        Jawline scaly papule 1.5cm  Eyes: Conjunctivae/lids:Normal     ENT: Lips, mucosa: normal    MSK:Normal    Cardiovascular: peripheral edema none    Pulm: Breathing Normal    Neuro/Psych: Orientation:Alert and Orientedx3 ; Mood/Affect:normal       A/P:  Jawline basal cell carcinoma   MOHS:   Location    The rationale for Mohs surgery was discussed with the patient and consent was obtained.  The risks and benefits as well as alternatives to therapy were discussed, in detail.  Specifically, the risks of infection, scarring, bleeding, prolonged wound healing, incomplete removal, allergy to anesthesia, nerve injury and recurrence were addressed.  Indication for Mohs was Location. Prior to the procedure, the treatment site was clearly identified and, if available, confirmed with previous photos and confirmed by the patient   All components of the Universal Protocol/PAUSE rule were completed.  The Mohs surgeon  operated in two distinct and integrated capacities as the surgeon and pathologist.      The area was prepped with Betasept.  A rim of normal appearing skin was marked circumferentially around the lesion.  The area was infiltrated with local anesthesia.  The tumor was first debulked to remove all clinically apparent tumor.  An incision following the standard Mohs approach was done and the specimen was oriented,mapped and placed in 1 block(s).  Each specimen was then chromacoded and processed in the Mohs laboratory using standard Mohs technique and submitted for frozen section histology.  Frozen section analysis showed no residual tumor but CLEAR MARGINS.      The tumor was excised using standard Mohs technique in 1 stages(s).  CLEAR MARGINS OBTAINED and Final defect size was 2.1cm.     We discussed the options for wound management in full with the patient including risks/benefits/ possible outcomes.      REPAIR COMPLEX: Because of the tightness of the surrounding skin and Because of the size and full thickness nature of the defect, Because of the tightness of the surrounding skin, To maintain form and function, and In order to avoid distortion, a complex closure was planned. After LE anesthesia and prep, Burow's triangles were excised in the relaxed skin tension lines. The wound edges were widely undermined greater than width of the defect on both sides by dissection in the subcutaneous plane until adequate tissue mobility was obtained. Hemostasis was obtained. The wound edges were closed in a layered fashion using Vicryl and Fast Absorbing Plain Gut sutures. Postoperative length was 3.8 cm.   EBL minimal; complications none; wound care routine.  The patient was discharged in good condition and will return in one week for wound evaluation.  It was a pleasure speaking to Miguel Baca today.  Previous clinic notes and pertinent laboratory tests were reviewed prior to Miguel Baca's visit.  Signs and Symptoms of  skin cancer discussed with patient.  Patient encouraged to perform monthly skin exams.  UV precautions reviewed with patient.  Risks of non-melanoma skin cancer discussed with patient   Return to clinic 6 months

## 2025-07-29 NOTE — LETTER
7/29/2025      Miguel Baca  [address] [address]  [facility] [address]      Dear Colleague,    Thank you for referring your patient, Miguel Baca, to the [facility]. Please see a copy of my visit note below.    Surgical Office Location :   [facility]  [address] [place] [address]      Miguel Baca is an extremely pleasant 69 year old year old male patient here today for evaluation and managment of basal cell carcinoma on jawline, previous sites healing well.  Patient has no other skin complaints today.  Remainder of the HPI, Meds, PMH, Allergies, FH, and SH was reviewed in chart.      Past Medical History:   Diagnosis Date     Acute chest pain 12/22/2015     Acute combined systolic and diastolic congestive heart failure (H)      Basal cell carcinoma 07/15/2025    Right Cheek     Cellulitis and abscess of unspecified site     Created by Conversion      Colon polyps      Cough     Created by Conversion      Hypertension      Morbid obesity (H)      Primary osteoarthritis of both knees      Pulmonary embolism (H)      Skin cancer      Thrombocytopenia      Urethral stricture        Past Surgical History:   Procedure Laterality Date     ARTHROSCOPY KNEE Left 2012     CYSTOSCOPY, DILATE URETHRA, COMBINED N/A 9/10/2024    Procedure: CYSTOSCOPY, URETHRAL DILATION;  Surgeon: Felix Lloyd MD;  Location[facility]     LAPAROSCOPIC APPENDECTOMY N/A 7/2/2025    Procedure: APPENDECTOMY, LAPAROSCOPIC with drain placement;  Surgeon: Keshia Pineda MD;  Location: [facility] OR     RETROGRADE URETHROGRAM N/A 9/10/2024    Procedure: RETROGRADE URETHROGRAM, OPTILUME BALLOON URETHRAL DILATION;  Surgeon: Felix Lloyd MD;  Location: [facility] OR        Family History   Problem Relation Age of Onset     Coronary Artery Disease Father         s/p stents, CABG     Coronary Artery Disease Brother         s/p stents     Cancer Mother      Parkinsonism Paternal  Grandmother      Diabetes Type 2  Paternal Grandmother        Social History     Socioeconomic History     Marital status: Single     Spouse name: Not on file     Number of children: Not on file     Years of education: Not on file     Highest education level: Not on file   Occupational History     Not on file   Tobacco Use     Smoking status: Former     Types: Cigarettes     Smokeless tobacco: Never   Vaping Use     Vaping status: Never Used   Substance and Sexual Activity     Alcohol use: Never     Drug use: Never     Sexual activity: Not on file   Other Topics Concern     Not on file   Social History Narrative    Lives independently near [place], [place] Works in [place] so rents a room from a [employer] during the work week     Social Drivers of Health     Financial Resource Strain: Low Risk  (7/1/2025)    Financial Resource Strain      Within the past 12 months, have you or your family members you live with been unable to get utilities (heat, electricity) when it was really needed?: No   Food Insecurity: Low Risk  (7/1/2025)    Food Insecurity      Within the past 12 months, did you worry that your food would run out before you got money to buy more?: No      Within the past 12 months, did the food you bought just not last and you didn t have money to get more?: No   Transportation Needs: Low Risk  (7/1/2025)    Transportation Needs      Within the past 12 months, has lack of transportation kept you from medical appointments, getting your medicines, non-medical meetings or appointments, work, or from getting things that you need?: No   Physical Activity: Not on file   Stress: Not on file   Social Connections: Socially Integrated (10/30/2023)    [facility]    Social Connections      Frequency of Communication with Friends and Family: 0   Interpersonal Safety: Low Risk  (7/1/2025)    Interpersonal Safety      Do you feel physically and emotionally safe where  you currently live?: Yes      Within the past 12 months, have you been hit, slapped, kicked or otherwise physically hurt by someone?: No      Within the past 12 months, have you been humiliated or emotionally abused in other ways by your partner or ex-partner?: No   Housing Stability: Low Risk  (7/1/2025)    Housing Stability      Do you have housing? : Yes      Are you worried about losing your housing?: No       Outpatient Encounter Medications as of 7/29/2025   Medication Sig Dispense Refill     apixaban ANTICOAGULANT (ELIQUIS ANTICOAGULANT) 5 MG tablet Take 1 tablet (5 mg) by mouth 2 times daily. 180 tablet 3     atenolol (TENORMIN) 25 MG tablet Take 1 tablet (25 mg) by mouth daily. 90 tablet 3     bumetanide (BUMEX) 1 MG tablet Take 2 tablets (2 mg) by mouth every morning AND 1 tablet (1 mg) at bedtime. 270 tablet 0     metFORMIN (GLUCOPHAGE XR) 500 MG 24 hr tablet Take 1 tablet (500 mg) by mouth daily (with dinner). 180 tablet 1     spironolactone (ALDACTONE) 25 MG tablet Take 2 tablets (50 mg) by mouth every morning. 180 tablet 3     tiZANidine (ZANAFLEX) 4 MG tablet Take 1 tablet (4 mg) by mouth nightly as needed for muscle spasms. 30 tablet 1     No facility-administered encounter medications on file as of 7/29/2025.             O:   NAD, WDWN, Alert & Oriented, Mood & Affect wnl, Vitals stable   General appearance normal   Vitals stable   Alert, oriented and in no acute distress        Jawline scaly papule 1.5cm  Eyes: Conjunctivae/lids:Normal     ENT: Lips, mucosa: normal    MSK:Normal    Cardiovascular: peripheral edema none    Pulm: Breathing Normal    Neuro/Psych: Orientation:Alert and Orientedx3 ; Mood/Affect:normal       A/P:  Jawline basal cell carcinoma   MOHS:   Location    The rationale for Mohs surgery was discussed with the patient and consent was obtained.  The risks and benefits as well as alternatives to therapy were discussed, in detail.  Specifically, the risks of infection, scarring,  bleeding, prolonged wound healing, incomplete removal, allergy to anesthesia, nerve injury and recurrence were addressed.  Indication for Mohs was Location. Prior to the procedure, the treatment site was clearly identified and, if available, confirmed with previous photos and confirmed by the patient   All components of the Universal Protocol/PAUSE rule were completed.  The Mohs surgeon operated in two distinct and integrated capacities as the surgeon and pathologist.      The area was prepped with Betasept.  A rim of normal appearing skin was marked circumferentially around the lesion.  The area was infiltrated with local anesthesia.  The tumor was first debulked to remove all clinically apparent tumor.  An incision following the standard Mohs approach was done and the specimen was oriented,mapped and placed in 1 block(s).  Each specimen was then chromacoded and processed in the Mohs laboratory using standard Mohs technique and submitted for frozen section histology.  Frozen section analysis showed no residual tumor but CLEAR MARGINS.      The tumor was excised using standard Mohs technique in 1 stages(s).  CLEAR MARGINS OBTAINED and Final defect size was 2.1cm.     We discussed the options for wound management in full with the patient including risks/benefits/ possible outcomes.      REPAIR COMPLEX: Because of the tightness of the surrounding skin and Because of the size and full thickness nature of the defect, Because of the tightness of the surrounding skin, To maintain form and function, and In order to avoid distortion, a complex closure was planned. After LE anesthesia and prep, Burow's triangles were excised in the relaxed skin tension lines. The wound edges were widely undermined greater than width of the defect on both sides by dissection in the subcutaneous plane until adequate tissue mobility was obtained. Hemostasis was obtained. The wound edges were closed in a layered fashion using Vicryl and Fast  Absorbing Plain Gut sutures. Postoperative length was 3.8 cm.   EBL minimal; complications none; wound care routine.  The patient was discharged in good condition and will return in one week for wound evaluation.  It was a pleasure speaking to Miguel Baca today.  Previous clinic notes and pertinent laboratory tests were reviewed prior to Miguel Baca's visit.  Signs and Symptoms of skin cancer discussed with patient.  Patient encouraged to perform monthly skin exams.  UV precautions reviewed with patient.  Risks of non-melanoma skin cancer discussed with patient   Return to clinic 6 months        Again, thank you for allowing me to participate in the care of your patient.        Sincerely,        Dougie Cazares MD    Electronically signed

## 2025-07-29 NOTE — PATIENT INSTRUCTIONS
Sutured Wound Care     [facility] [phone]      No strenuous activity for 48 hours. Resume moderate activity in 48 hours. No heavy exercising until you are seen for follow up in one week.     Take Tylenol as needed for discomfort.                         Do not drink alcoholic beverages for 48 hours.     Keep the pressure bandage in place for 24 hours. If the bandage becomes blood tinged or loose, reinforce it with gauze and tape.        (Refer to the reverse side of this page for management of bleeding).    Remove pressure bandage in 24 hours     Leave the flat bandage in place for one week.    Keep the bandage dry. Wash around it carefully.    If the tape becomes soiled or starts to come off, reinforce it with additional paper tape.    Do not smoke for 3 weeks; smoking is detrimental to wound healing.    It is normal to have swelling and bruising around the surgical site. The bruising will fade in approximately 10-14 days. Elevate the area to reduce swelling.    Numbness, itchiness and sensitivity to temperature changes can occur after surgery and may take up to 18 months to normalize.      POSSIBLE COMPLICATIONS    BLEEDING:    Leave the bandage in place.  Use tightly rolled up gauze or a cloth to apply direct pressure over the bandage for 20   minutes.  Reapply pressure for an additional 20 minutes if necessary  Call the office or go to the nearest emergency room if pressure fails to stop the bleeding.  Use additional gauze and tape to maintain pressure once the bleeding has stopped.        PAIN:    Post operative pain should slowly get better, never worse.  A severe increase in pain may indicate a problem. Call the office if this occurs.    In case of emergency phone:Dr Cazares [phone]           ONE WEEK AFTER SURGERY (8/5):  -Remove bandage  -Clean and dry the area with plain tap water using a Q-tip or sterile gauze pad  -Reapply steri strips down incision and  cover with paper tape  -Leave bandage in place for 1  week  -Remove bandage on   8/12/25      when you remove the bandage, you may resume your regular skin care routine, including washing with mild soap and water, applying moisturizer, make-up and sunscreen.    If there are any open or bleeding areas at the incision/graft site you should begin to cover the area with a bandage daily as follows:    Clean and dry the area with plain tap water using a Q-tip or sterile gauze pad.  Apply Polysporin or Bacitracin ointment to the open area.  Cover the wound with a band-aid or a sterile non-stick gauze pad and micropore paper tape.     SIGNS OF INFECTION  - If you notice any of these signs of infection, call your doctor right away: expanding redness around the wound.  - Yellow or greenish-colored pus or cloudy wound drainage.    - Red streaking spreading from the wound.  - Increased swelling, tenderness, or pain around the wound.   - Fever.    Please remember that yellow and clear drainage from a wound can be normal and related to normal wound healing.  Isolated drainage from a wound without a combination of the above features does not indicate infection.     *Once the bandages are removed, the scar will be red and firm (especially in the lip/chin area). This is normal and will fade in time. It might take 6-12 months for this to happen.     *Massaging the area will help the scar soften and fade quicker. Begin to massage the area one month after the bandages have been removed. To massage apply pressure directly and firmly over the scar with the fingertips and move in a circular motion. Massage the area for a few minutes several times a day. Continue to massage the site for several months.    *Approximately 6-8 weeks after surgery it is not uncommon to see the formation of  tender pimple-like  bump along the scar. This is normal. As the scar continues to mature and the stitches underneath the skin begin to dissolve, this might  occur. Do not pick or squeeze, this will resolve on it s own. Should one break open producing a small amount of drainage, apply Polysporin or Bacitracin ointment a few times a day until the wound is completely healed.    *Numbness in the surgical area is expected. It might take 12-18 months for the feeling to return to normal. During this time sensations of itchiness, tingling and occasional sharp pains might be noted. These feelings are normal and will subside once the nerves have completely healed.

## 2025-07-31 ENCOUNTER — OFFICE VISIT (OUTPATIENT)
Dept: DERMATOLOGY | Facility: CLINIC | Age: 70
End: 2025-07-31
Payer: MEDICARE

## 2025-07-31 DIAGNOSIS — L91.8 INFLAMED ACROCHORDON: ICD-10-CM

## 2025-07-31 DIAGNOSIS — I87.2 STASIS DERMATITIS OF BOTH LEGS: ICD-10-CM

## 2025-07-31 DIAGNOSIS — Z12.83 SKIN CANCER SCREENING: ICD-10-CM

## 2025-07-31 DIAGNOSIS — L82.1 SEBORRHEIC KERATOSES: ICD-10-CM

## 2025-07-31 DIAGNOSIS — D22.9 MULTIPLE BENIGN NEVI: Primary | ICD-10-CM

## 2025-07-31 DIAGNOSIS — L57.0 ACTINIC KERATOSES: ICD-10-CM

## 2025-07-31 DIAGNOSIS — D18.01 CHERRY ANGIOMA: ICD-10-CM

## 2025-07-31 RX ORDER — TRIAMCINOLONE ACETONIDE 1 MG/G
CREAM TOPICAL 2 TIMES DAILY
Qty: 80 G | Refills: 2 | Status: SHIPPED | OUTPATIENT
Start: 2025-07-31

## 2025-07-31 NOTE — PATIENT INSTRUCTIONS
Cryo instructions given.    Proper skin care from [facility]:    -Eliminate harsh soaps as they strip the natural oils from the skin, often resulting in dry itchy skin ( i.e. Dial, Zest, Kiswahili Spring)  -Use mild soaps such as Cetaphil or Dove Sensitive Skin in the shower. You do not need to use soap on arms, legs, and trunk every time you shower unless visibly soiled.   -Avoid hot or cold showers.  -After showering, lightly (pat) dry off and apply moisturizing within 2-3 minutes. This will help trap moisture in the skin.   -Aggressive use of a moisturizer at least 1-2 times a day to the entire body (including -Vanicream, Cetaphil, Aquaphor or Cerave) and moisturize hands after every washing.  -We recommend using moisturizers that come in a tub that needs to be scooped out, not a pump. This has more of an oil base. It will hold moisture in your skin much better than a water base moisturizer. The above recommended are non-pore clogging.      Wear a sunscreen with at least SPF 30 on your face, ears, neck and V of the chest daily. Wear sunscreen on other areas of the body if those areas are exposed to the sun throughout the day. Sunscreens can contain physical and/or chemical blockers. Physical blockers are less likely to clog pores, these include zinc oxide and titanium dioxide. Reapply every two hour and after swimming.     Sunscreen examples: https://www.ewg.org/sunscreen/    UV radiation  UVA radiation remains constant throughout the day and throughout the year. It is a longer wavelength than UVB and therefore penetrates deeper into the skin leading to immediate and delayed tanning, photoaging, and skin cancer. 70-80% of UVA and UVB radiation occurs between the hours of 10am-2pm.  UVB radiation  UVB radiation causes the most harmful effects and is more significant during the summer months. However, snow and ice can reflect UVB radiation leading to skin damage during the winter months as well. UVB  radiation is responsible for tanning, burning, inflammation, delayed erythema (pinkness), pigmentation (brown spots), and skin cancer.     I recommend self monthly full body exams and yearly full body exams with a dermatology provider. If you develop a new or changing lesion please follow up for examination. Most skin cancers are pink and scaly or pink and pearly. However, we do see blue/brown/black skin cancers.  Consider the ABCDEs of melanoma when giving yourself your monthly full body exam ( don't forget the groin, buttocks, feet, toes, etc). A-asymmetry, B-borders, C-color, D-diameter, E-elevation or evolving. If you see any of these changes please follow up in clinic. If you cannot see your back I recommend purchasing a hand held mirror to use with a larger wall mirror.       Checking for Skin Cancer  You can find cancer early by checking your skin each month. There are 3 kinds of skin cancer. They are melanoma, basal cell carcinoma, and squamous cell carcinoma. Doing monthly skin checks is the best way to find new marks or skin changes. Follow the instructions below for checking your skin.   The ABCDEs of checking moles for melanoma   Check your moles or growths for signs of melanoma using ABCDE:   Asymmetry: the sides of the mole or growth don t match  Border: the edges are ragged, notched, or blurred  Color: the color within the mole or growth varies  Diameter: the mole or growth is larger than 6 mm (size of a pencil eraser)  Evolving: the size, shape, or color of the mole or growth is changing (evolving is not shown in the images below)    Checking for other types of skin cancer  Basal cell carcinoma or squamous cell carcinoma have symptoms such as:     A spot or mole that looks different from all other marks on your skin  Changes in how an area feels, such as itching, tenderness, or pain  Changes in the skin's surface, such as oozing, bleeding, or scaliness  A sore that does not heal  New swelling or  redness beyond the border of a mole    Who s at risk?  Anyone can get skin cancer. But you are at greater risk if you have:   Fair skin, light-colored hair, or light-colored eyes  Many moles or abnormal moles on your skin  A history of sunburns from sunlight or tanning beds  A family history of skin cancer  A history of exposure to radiation or chemicals  A weakened immune system  If you have had skin cancer in the past, you are at risk for recurring skin cancer.   How to check your skin  Do your monthly skin checkups in front of a full-length mirror. Check all parts of your body, including your:   Head (ears, face, neck, and scalp)  Torso (front, back, and sides)  Arms (tops, undersides, upper, and lower armpits)  Hands (palms, backs, and fingers, including under the nails)  Buttocks and genitals  Legs (front, back, and sides)  Feet (tops, soles, toes, including under the nails, and between toes)  If you have a lot of moles, take digital photos of them each month. Make sure to take photos both up close and from a distance. These can help you see if any moles change over time.   Most skin changes are not cancer. But if you see any changes in your skin, call your doctor right away. Only he or she can diagnose a problem. If you have skin cancer, seeing your doctor can be the first step toward getting the treatment that could save your life.   [employer] last reviewed this educational content on 4/1/2019 2000-2020 [employer]. [address] [place] [address], [place] [address]. All rights reserved. This information is not intended as a substitute for professional medical care. Always follow your healthcare professional's instructions.       When should I call my doctor?  If you are worsening or not improving, please, contact us or seek urgent care as noted below.     Who should I call with questions (adults)?    [facility] [phone]  For urgent needs outside of business hours  call the [facility] at [phone] and ask for the dermatology resident on call to be paged  If this is a medical emergency and you are unable to reach an ER, Call [phone]      If you need a prescription refill, please contact your pharmacy. Refills are approved or denied by our Physicians during normal business hours, Monday through Friday.  Per office policy, refills will not be granted if you have not been seen within the past year (or sooner depending on the condition).

## 2025-07-31 NOTE — LETTER
7/31/2025      Miguel Baca  [address]  [place] [address]      Dear Colleague,    Thank you for referring your patient, Miguel Baca, to the [facility]. Please see a copy of my visit note below.    [facility] Dermatology Note  Encounter Date: Jul 31, 2025  Office Visit     Reviewed patients past medical history and pertinent chart review prior to patients visit today.     Dermatology Problem List:  Last skin check: 7/31/2025    1.  History of nonmelanoma skin cancer   - Basal cell carcinoma, right cheek, status post Mohs 7/15/2025   - Basal cell carcinoma, right jawline, status post Mohs 7/29/2025  2.  Inflamed acrochordons, bilateral axillae, removed 6/30/2025 and 7/31/2025  3.  Stasis dermatitis   - Recommended compression stockings and prescribed triamcinolone 0.1% cream, 7/31/2025    Family Hx: Brother, history of skin cancer, unknown type    ____________________________________________      CC: Skin Check (Skin tags removed )    HPI:  Mr. Miguel Baca is a(n) 69 year old male who presents today as a return patient for a full body skin cancer screening. The patient has a history of basal cell carcinoma involving the right cheek and right jawline that were recently treated with Mohs micrographic surgery.  Today, he is requesting removal of additional skin tags involving the bilateral axillae.  These are bothersome to the patient.  He is also experiencing some swelling and stinging involving the lower legs.  The patient tries to use the compression wraps.  He denies being diligent with sunscreen.    Medications:  Current Outpatient Medications   Medication Sig Dispense Refill     triamcinolone (KENALOG) 0.1 % external cream Apply topically 2 times daily. Apply to affected areas on legs for up to 2-3 weeks, then as needed for flares. Do not use daily long term. 80 g 2     apixaban ANTICOAGULANT (ELIQUIS ANTICOAGULANT) 5 MG tablet Take 1 tablet (5  mg) by mouth 2 times daily. 180 tablet 3     atenolol (TENORMIN) 25 MG tablet Take 1 tablet (25 mg) by mouth daily. 90 tablet 3     bumetanide (BUMEX) 1 MG tablet Take 2 tablets (2 mg) by mouth every morning AND 1 tablet (1 mg) at bedtime. 270 tablet 0     metFORMIN (GLUCOPHAGE XR) 500 MG 24 hr tablet Take 1 tablet (500 mg) by mouth daily (with dinner). 180 tablet 1     spironolactone (ALDACTONE) 25 MG tablet Take 2 tablets (50 mg) by mouth every morning. 180 tablet 3     tiZANidine (ZANAFLEX) 4 MG tablet Take 1 tablet (4 mg) by mouth nightly as needed for muscle spasms. 30 tablet 1     No current facility-administered medications for this visit.      Past Medical History:   Patient Active Problem List   Diagnosis     Essential Hypertension     Seborrheic Keratosis     Knee Sprain     Primary osteoarthritis of both knees     Bilateral pulmonary embolism (H)     Acute combined systolic and diastolic congestive heart failure (H)     Thrombocytopenia     Acute congestive heart failure, unspecified heart failure type (H)     Hypertensive heart disease with heart failure (H)     Abnormal CT scan     Acute appendicitis with perforation and localized peritonitis, without abscess or gangrene     Past Medical History:   Diagnosis Date     Acute chest pain 12/22/2015     Acute combined systolic and diastolic congestive heart failure (H)      Basal cell carcinoma 07/15/2025    Right Cheek     Cellulitis and abscess of unspecified site     Created by Conversion      Colon polyps      Cough     Created by Conversion      Hypertension      Morbid obesity (H)      Primary osteoarthritis of both knees      Pulmonary embolism (H)      Skin cancer      Thrombocytopenia      Urethral stricture        ____________________________________________     Physical Exam:  Vitals: There were no vitals taken for this visit.   SKIN: Total skin excluding the genitalia areas was performed. The exam included the scalp, face, neck, bilateral arms,  chest, back, abdomen, bilateral legs, digits, mons pubis, buttocks, and nails.   - Peace II.  - Multiple tan/brown macules and papules scattered throughout exam, consistent with benign nevi, many of which were examined under dermoscopy with no concerning features found  - Scattered tan, homogenous macules on sun exposed skin, consistent with solar lentigines  - Scattered waxy, stuck on appearing papules and patches, consistent with seborrheic keratoses  - Several 1-2 mm red dome shaped symmetric papules, consistent with cherry angiomas  -Right cheek and right jawline, well healing scars with no signs of skin cancer recurrence    - Right antihelix x 1, pink to red rough adherent papule consistent with actinic keratosis  - Bilateral shins and calves, edema present with overlying tan dry flaking patches  - Bilateral axillae, flesh-colored pedunculated irritated papules consistent with inflamed acrochordons    - No other lesions of concern on areas examined    ____________________________________________      Assessment & Plan:   # Personal history of nonmelanoma skin cancer, basal cell carcinoma, right cheek and right jawline, status post Mohs July 2025  - No signs of recurrence. Continued observation recommended.   - Sun protection: Counseled SPF 30+ sunscreen, UPF clothing, sun avoidance, tanning bed avoidance.    # Nevi, trunk and extremities  # Solar lentigines  - Nevi demonstrate no concerning features on dermoscopy. We discussed the importance of self exams at home.   - ABCDEs: Counseled ABCDEs of melanoma: Asymmetry, Border (irregularity), Color (not uniform, changes in color), Diameter (greater than 6 mm which is about the size of a pencil eraser), and Evolving (any changes in preexisting moles).    # Cherry angiomas  # Seborrheic keratoses  - We discussed the benign nature of the skin lesions. No treatment required. Continued observation recommended. Follow up with any concerns or changes.      # Actinic  keratoses  Actinic keratoses are pre-cancerous skin growths caused by sun exposure. Treatment is recommended and medically indicated. Treated with cryotherapy as outlined below.     Procedures performed: Cryotherapy  - Location(s): right antihelix. After verbal consent and discussion of risks and benefits including, but not limited to, dyspigmentation/scar, blister, and pain, 1 lesion(s) was(were) treated with 1-2 mm freeze border for 1-2 cycles with liquid nitrogen. Post cryotherapy instructions were provided. The patient should return if the lesions do not resolve.      # Inflamed acrochordons, bilateral axillae x15  The benign nature of the skin lesions was discussed with the patient.  Options were discussed including no treatment versus removal.  The patient requested removal of a total of 15 inflamed acrochordons due to irritation.    After being cleansed with an alcohol swab, 15 acrochordons were removed with a sharp sterile scissors and forceps.  The patient tolerated the procedure well.  No complications.  Bandages applied where needed for hemostasis.     The larger acrochordons were anesthetized with lidocaine/epinephrine.    # Stasis dermatitis   The importance of wearing compression stockings or wraps daily discussed to help decreased edema.  I also recommend diligent moisturization.  I have prescribed triamcinolone 0.1% cream for the patient to apply to the affected areas on his legs twice daily for up to 2 to 3 weeks at a time, then as needed for flares including itching.  Triamcinolone, topical steroid, should not be applied daily long-term due to risk of atrophy.    Follow-up:  6 months for follow up full body skin exam, as needed for new or changing lesions or new concerns    All risks, benefits and alternatives were discussed with patient.  Patient is in agreement and understands the assessment and plan.  All questions were answered.    Lynette Sue PA-C  [facility]ermat[facility]      CC  Lynette Sue PA-C  [address] [place] [address] on close of this encounter.    Again, thank you for allowing me to participate in the care of your patient.        Sincerely,        Lyentte Sue PA-C    Electronically signed

## 2025-07-31 NOTE — PROGRESS NOTES
[facility] Dermatology Note  Encounter Date: Jul 31, 2025  Office Visit     Reviewed patients past medical history and pertinent chart review prior to patients visit today.     Dermatology Problem List:  Last skin check: 7/31/2025    1.  History of nonmelanoma skin cancer   - Basal cell carcinoma, right cheek, status post Mohs 7/15/2025   - Basal cell carcinoma, right jawline, status post Mohs 7/29/2025  2.  Inflamed acrochordons, bilateral axillae, removed 6/30/2025 and 7/31/2025  3.  Stasis dermatitis   - Recommended compression stockings and prescribed triamcinolone 0.1% cream, 7/31/2025    Family Hx: Brother, history of skin cancer, unknown type    ____________________________________________      CC: Skin Check (Skin tags removed )    HPI:  Mr. Miguel Baca is a(n) 69 year old male who presents today as a return patient for a full body skin cancer screening. The patient has a history of basal cell carcinoma involving the right cheek and right jawline that were recently treated with Mohs micrographic surgery.  Today, he is requesting removal of additional skin tags involving the bilateral axillae.  These are bothersome to the patient.  He is also experiencing some swelling and stinging involving the lower legs.  The patient tries to use the compression wraps.  He denies being diligent with sunscreen.    Medications:  Current Outpatient Medications   Medication Sig Dispense Refill    triamcinolone (KENALOG) 0.1 % external cream Apply topically 2 times daily. Apply to affected areas on legs for up to 2-3 weeks, then as needed for flares. Do not use daily long term. 80 g 2    apixaban ANTICOAGULANT (ELIQUIS ANTICOAGULANT) 5 MG tablet Take 1 tablet (5 mg) by mouth 2 times daily. 180 tablet 3    atenolol (TENORMIN) 25 MG tablet Take 1 tablet (25 mg) by mouth daily. 90 tablet 3    bumetanide (BUMEX) 1 MG tablet Take 2 tablets (2 mg) by mouth every morning AND 1 tablet (1 mg) at bedtime. 270  tablet 0    metFORMIN (GLUCOPHAGE XR) 500 MG 24 hr tablet Take 1 tablet (500 mg) by mouth daily (with dinner). 180 tablet 1    spironolactone (ALDACTONE) 25 MG tablet Take 2 tablets (50 mg) by mouth every morning. 180 tablet 3    tiZANidine (ZANAFLEX) 4 MG tablet Take 1 tablet (4 mg) by mouth nightly as needed for muscle spasms. 30 tablet 1     No current facility-administered medications for this visit.      Past Medical History:   Patient Active Problem List   Diagnosis    Essential Hypertension    Seborrheic Keratosis    Knee Sprain    Primary osteoarthritis of both knees    Bilateral pulmonary embolism (H)    Acute combined systolic and diastolic congestive heart failure (H)    Thrombocytopenia    Acute congestive heart failure, unspecified heart failure type (H)    Hypertensive heart disease with heart failure (H)    Abnormal CT scan    Acute appendicitis with perforation and localized peritonitis, without abscess or gangrene     Past Medical History:   Diagnosis Date    Acute chest pain 12/22/2015    Acute combined systolic and diastolic congestive heart failure (H)     Basal cell carcinoma 07/15/2025    Right Cheek    Cellulitis and abscess of unspecified site     Created by Conversion     Colon polyps     Cough     Created by Conversion     Hypertension     Morbid obesity (H)     Primary osteoarthritis of both knees     Pulmonary embolism (H)     Skin cancer     Thrombocytopenia     Urethral stricture        ____________________________________________     Physical Exam:  Vitals: There were no vitals taken for this visit.   SKIN: Total skin excluding the genitalia areas was performed. The exam included the scalp, face, neck, bilateral arms, chest, back, abdomen, bilateral legs, digits, mons pubis, buttocks, and nails.   - Peace II.  - Multiple tan/brown macules and papules scattered throughout exam, consistent with benign nevi, many of which were examined under dermoscopy with no concerning features  found  - Scattered tan, homogenous macules on sun exposed skin, consistent with solar lentigines  - Scattered waxy, stuck on appearing papules and patches, consistent with seborrheic keratoses  - Several 1-2 mm red dome shaped symmetric papules, consistent with cherry angiomas  -Right cheek and right jawline, well healing scars with no signs of skin cancer recurrence    - Right antihelix x 1, pink to red rough adherent papule consistent with actinic keratosis  - Bilateral shins and calves, edema present with overlying tan dry flaking patches  - Bilateral axillae, flesh-colored pedunculated irritated papules consistent with inflamed acrochordons    - No other lesions of concern on areas examined    ____________________________________________      Assessment & Plan:   # Personal history of nonmelanoma skin cancer, basal cell carcinoma, right cheek and right jawline, status post Mohs July 2025  - No signs of recurrence. Continued observation recommended.   - Sun protection: Counseled SPF 30+ sunscreen, UPF clothing, sun avoidance, tanning bed avoidance.    # Nevi, trunk and extremities  # Solar lentigines  - Nevi demonstrate no concerning features on dermoscopy. We discussed the importance of self exams at home.   - ABCDEs: Counseled ABCDEs of melanoma: Asymmetry, Border (irregularity), Color (not uniform, changes in color), Diameter (greater than 6 mm which is about the size of a pencil eraser), and Evolving (any changes in preexisting moles).    # Cherry angiomas  # Seborrheic keratoses  - We discussed the benign nature of the skin lesions. No treatment required. Continued observation recommended. Follow up with any concerns or changes.      # Actinic keratoses  Actinic keratoses are pre-cancerous skin growths caused by sun exposure. Treatment is recommended and medically indicated. Treated with cryotherapy as outlined below.     Procedures performed: Cryotherapy  - Location(s): right antihelix. After verbal consent  and discussion of risks and benefits including, but not limited to, dyspigmentation/scar, blister, and pain, 1 lesion(s) was(were) treated with 1-2 mm freeze border for 1-2 cycles with liquid nitrogen. Post cryotherapy instructions were provided. The patient should return if the lesions do not resolve.      # Inflamed acrochordons, bilateral axillae x15  The benign nature of the skin lesions was discussed with the patient.  Options were discussed including no treatment versus removal.  The patient requested removal of a total of 15 inflamed acrochordons due to irritation.    After being cleansed with an alcohol swab, 15 acrochordons were removed with a sharp sterile scissors and forceps.  The patient tolerated the procedure well.  No complications.  Bandages applied where needed for hemostasis.     The larger acrochordons were anesthetized with lidocaine/epinephrine.    # Stasis dermatitis   The importance of wearing compression stockings or wraps daily discussed to help decreased edema.  I also recommend diligent moisturization.  I have prescribed triamcinolone 0.1% cream for the patient to apply to the affected areas on his legs twice daily for up to 2 to 3 weeks at a time, then as needed for flares including itching.  Triamcinolone, topical steroid, should not be applied daily long-term due to risk of atrophy.    Follow-up:  6 months for follow up full body skin exam, as needed for new or changing lesions or new concerns    All risks, benefits and alternatives were discussed with patient.  Patient is in agreement and understands the assessment and plan.  All questions were answered.    Lynette Sue PA-C  [facility]      CC Lynette Sue PA-C  [address] [place] [address] on close of this encounter.

## 2025-07-31 NOTE — PROGRESS NOTES
"Surgery Post-op Note  [facility] General Surgery  [address] [place] [address]  T: [phone]  F: [phone]    Subjective:     Miguel Baca presents to the clinic after undergoing laparoscopic appendectomy with drain placement for acute appendicitis  with perforation. He states he is now doing better. He came from a dermatology appointment so he has a bandage for this. He has not had any more abdominal pain. He has not had any fevers or chills. He has not had any nausea or vomiting. He is not having any issues with his incisions.         Objective:     Vitals:   /68 (BP Location: Left arm, Patient Position: Sitting, Cuff Size: Adult Large)   Pulse 65   Ht 1.753 m (5' 9\")   Wt (!) 155.6 kg (343 lb)   SpO2 92%   BMI 50.65 kg/m     General Appearance:    Miguel is a well-appearing 69 year  male who is in no acute distress.   Cardiac:    Skin is warm and dry     Pulmonary:   Regular rate, rhythm   Abdomen:    Soft, obese abdomen. Incisions are c/d/I. No  RLQ tenderness to palpation, no peritoneal signs.    Incision: The incision site is healing  well. There are no signs of cellulitis or drainage.        Labs/Imaging:     None       Pathology:     Final Diagnosis   APPENDIX, APPENDECTOMY:  - PERFORATED ACUTE APPENDICITIS         Assessment:     Mr.Harold SAMRA Baca is status post laparoscopic appendectomy, doing well .        Plan:     1. The patient is instructed to call the office if there is any increasing incisional pain, swelling, redness, drainage, or any other problems.   2. Continue lifting restrictions until 6 weeks from surgery, do not lift more than 20lbs  3. If fevers, chills, abdominal pain or other concerning symptoms, return to clinic, urgent care or ED   4. Recommend colonoscopy in 6-8 weeks   "

## 2025-07-31 NOTE — NURSING NOTE
Due to bandage partially coming off, bandage was removed and replaced with a new one today.     Bandage removed from right jaw, area cleansed with normal saline. Site is healing and wound edges approximating well. Reapplied new steri strips and paper tape.    Advised to watch for signs/sx of infection; spreading redness, drainage, odor, fever. Call or report promptly to clinic. Pt given written instructions and informed to rtc as needed. Patient verbalized understanding.      Kiera Thomas MA

## 2025-08-24 ASSESSMENT — ENCOUNTER SYMPTOMS
BACK PAIN: 0
FEVER: 0
ABDOMINAL PAIN: 1
VOMITING: 0
SEIZURES: 0
SORE THROAT: 0
COUGH: 0
PALPITATIONS: 0
HEMATURIA: 0
COLOR CHANGE: 0
DYSURIA: 0
ARTHRALGIAS: 0
CHILLS: 0
SHORTNESS OF BREATH: 0
EYE PAIN: 0

## 2025-08-25 ENCOUNTER — OFFICE VISIT (OUTPATIENT)
Dept: FAMILY MEDICINE | Facility: CLINIC | Age: 70
End: 2025-08-25
Payer: MEDICARE

## 2025-08-25 VITALS
SYSTOLIC BLOOD PRESSURE: 137 MMHG | OXYGEN SATURATION: 90 % | RESPIRATION RATE: 16 BRPM | HEART RATE: 83 BPM | WEIGHT: 315 LBS | HEIGHT: 67 IN | DIASTOLIC BLOOD PRESSURE: 74 MMHG | TEMPERATURE: 97.7 F | BODY MASS INDEX: 49.44 KG/M2

## 2025-08-25 DIAGNOSIS — Z23 NEED FOR COVID-19 VACCINE: ICD-10-CM

## 2025-08-25 DIAGNOSIS — I26.99 BILATERAL PULMONARY EMBOLISM (H): ICD-10-CM

## 2025-08-25 DIAGNOSIS — Z23 NEED FOR PNEUMOCOCCAL VACCINATION: ICD-10-CM

## 2025-08-25 DIAGNOSIS — Z90.49 S/P APPENDECTOMY: ICD-10-CM

## 2025-08-25 DIAGNOSIS — E11.65 TYPE 2 DIABETES MELLITUS WITH HYPERGLYCEMIA, WITHOUT LONG-TERM CURRENT USE OF INSULIN (H): ICD-10-CM

## 2025-08-25 DIAGNOSIS — I10 ESSENTIAL HYPERTENSION: ICD-10-CM

## 2025-08-25 DIAGNOSIS — I11.0 HYPERTENSIVE HEART DISEASE WITH HEART FAILURE (H): ICD-10-CM

## 2025-08-25 DIAGNOSIS — R60.0 BILATERAL LOWER EXTREMITY EDEMA: Primary | ICD-10-CM

## 2025-08-25 LAB
ANION GAP SERPL CALCULATED.3IONS-SCNC: 6 MMOL/L (ref 7–15)
BUN SERPL-MCNC: 14.9 MG/DL (ref 8–23)
CALCIUM SERPL-MCNC: 9.2 MG/DL (ref 8.8–10.4)
CHLORIDE SERPL-SCNC: 101 MMOL/L (ref 98–107)
CREAT SERPL-MCNC: 0.88 MG/DL (ref 0.67–1.17)
CREAT UR-MCNC: 135 MG/DL
EGFRCR SERPLBLD CKD-EPI 2021: >90 ML/MIN/1.73M2
EST. AVERAGE GLUCOSE BLD GHB EST-MCNC: 143 MG/DL
GLUCOSE SERPL-MCNC: 102 MG/DL (ref 70–99)
HBA1C MFR BLD: 6.6 % (ref 0–5.6)
HCO3 SERPL-SCNC: 34 MMOL/L (ref 22–29)
MICROALBUMIN UR-MCNC: <12 MG/L
MICROALBUMIN/CREAT UR: NORMAL MG/G{CREAT}
POTASSIUM SERPL-SCNC: 4.6 MMOL/L (ref 3.4–5.3)
SODIUM SERPL-SCNC: 141 MMOL/L (ref 135–145)

## 2025-08-25 PROCEDURE — 83036 HEMOGLOBIN GLYCOSYLATED A1C: CPT | Performed by: FAMILY MEDICINE

## 2025-08-25 PROCEDURE — 80048 BASIC METABOLIC PNL TOTAL CA: CPT | Performed by: FAMILY MEDICINE

## 2025-08-25 PROCEDURE — 3075F SYST BP GE 130 - 139MM HG: CPT | Performed by: FAMILY MEDICINE

## 2025-08-25 PROCEDURE — G0009 ADMIN PNEUMOCOCCAL VACCINE: HCPCS | Performed by: FAMILY MEDICINE

## 2025-08-25 PROCEDURE — 82570 ASSAY OF URINE CREATININE: CPT | Performed by: FAMILY MEDICINE

## 2025-08-25 PROCEDURE — 82043 UR ALBUMIN QUANTITATIVE: CPT | Performed by: FAMILY MEDICINE

## 2025-08-25 PROCEDURE — 90677 PCV20 VACCINE IM: CPT | Performed by: FAMILY MEDICINE

## 2025-08-25 PROCEDURE — 99214 OFFICE O/P EST MOD 30 MIN: CPT | Performed by: FAMILY MEDICINE

## 2025-08-25 PROCEDURE — 3044F HG A1C LEVEL LT 7.0%: CPT | Performed by: FAMILY MEDICINE

## 2025-08-25 PROCEDURE — G2211 COMPLEX E/M VISIT ADD ON: HCPCS | Performed by: FAMILY MEDICINE

## 2025-08-25 PROCEDURE — 3078F DIAST BP <80 MM HG: CPT | Performed by: FAMILY MEDICINE

## 2025-08-25 PROCEDURE — 36415 COLL VENOUS BLD VENIPUNCTURE: CPT | Performed by: FAMILY MEDICINE

## 2025-08-25 ASSESSMENT — ENCOUNTER SYMPTOMS
FEVER: 0
HEMATURIA: 0
BACK PAIN: 0
COLOR CHANGE: 0
PSYCHIATRIC NEGATIVE: 1
VOMITING: 0
SEIZURES: 0
CHILLS: 0
DYSURIA: 0
SORE THROAT: 0
BRUISES/BLEEDS EASILY: 0
FATIGUE: 1
COUGH: 0
PALPITATIONS: 0
EYE PAIN: 0
ARTHRALGIAS: 0
SHORTNESS OF BREATH: 1
ABDOMINAL PAIN: 0

## 2025-08-26 ENCOUNTER — PATIENT OUTREACH (OUTPATIENT)
Dept: CARE COORDINATION | Facility: CLINIC | Age: 70
End: 2025-08-26
Payer: MEDICARE

## 2025-08-26 DIAGNOSIS — M54.6 ACUTE RIGHT-SIDED THORACIC BACK PAIN: ICD-10-CM

## 2025-08-28 ENCOUNTER — PATIENT OUTREACH (OUTPATIENT)
Dept: CARE COORDINATION | Facility: CLINIC | Age: 70
End: 2025-08-28
Payer: MEDICARE

## (undated) DEVICE — SURGICEL HEMOSTAT 4X8" 1952S

## (undated) DEVICE — GUIDEWIRE BENTSON FLEX TIP 0.035"X150CM M0066201250

## (undated) DEVICE — ANTIFOG SOLUTION SEE SHARP 150M TROCAR SWABS 30978

## (undated) DEVICE — SOL WATER IRRIG 1000ML BOTTLE 2F7114

## (undated) DEVICE — DRAPE C-ARM 60X42" 1013

## (undated) DEVICE — MAT FLOOR SURGICAL 40X38 0702140238

## (undated) DEVICE — GLOVE BIOGEL PI INDICATOR 8.0 LF 41680

## (undated) DEVICE — ESU LIGASURE MARYLAND LAPAROSCOPIC SLR/DVDR 5MMX37CM LF1937

## (undated) DEVICE — SU MONOCRYL 4-0 PS-2 18" UND Y496G

## (undated) DEVICE — DEVICE SUTURE PASSER 14GA WECK EFX EFXSP2

## (undated) DEVICE — STPL POWERED ECHELON 45MM PSEE45A

## (undated) DEVICE — SU MONOCRYL 3-0 SH 27" UND Y416H

## (undated) DEVICE — GLOVE BIOGEL PI MICRO INDICATOR UNDERGLOVE SZ 6.0 48960

## (undated) DEVICE — STPL RELOAD REG TISSUE ECHELON 45 X 3.6MM BLUE GST45B

## (undated) DEVICE — SOL NACL 0.9% IRRIG 3000ML BAG 07972-08

## (undated) DEVICE — SU DERMABOND ADVANCED .7ML DNX12

## (undated) DEVICE — SU SILK 2-0 FS-1 18" 685G

## (undated) DEVICE — ENDO TROCAR FIRST ENTRY KII FIOS ADV FIX 05X100MM CFF03

## (undated) DEVICE — ENDO POUCH UNIVERSAL RETRIEVAL SYSTEM INZII 5MM CD003

## (undated) DEVICE — SYR 50ML SLIP TIP W/O NDL 309654

## (undated) DEVICE — ESU HOLSTER PLASTIC DISP E2400

## (undated) DEVICE — GOWN SRG XL LVL 3 NONREINFORCE SET IN SLV STRL LF 9545

## (undated) DEVICE — CUSTOM PACK CYSTO PREFERRED SOT5BCYHEA

## (undated) DEVICE — ENDO TROCAR SLEEVE KII Z-THREADED 05X100MM CTS02

## (undated) DEVICE — Device

## (undated) DEVICE — DRAPE POUCH INSTRUMENT 3 POCKET 1018L

## (undated) DEVICE — DRAIN RESERVOIR 100ML JP 0070740

## (undated) DEVICE — SU VICRYL+ 0 54IN UND VCP287G

## (undated) DEVICE — DRSG DRAIN 4X4" 7086

## (undated) DEVICE — STOCKING SLEEVE COMPRESSION CALF MED

## (undated) DEVICE — SUCTION IRR STRYKERFLOW II W/TIP 250-070-520

## (undated) DEVICE — ENDO TROCAR FIRST ENTRY KII FIOS ADV FIX 12X100MM CFF73

## (undated) DEVICE — SUCTION MANIFOLD NEPTUNE 2 SYS 1 PORT 702-025-000

## (undated) DEVICE — SYR 50ML CATH TIP W/O NDL 309620

## (undated) DEVICE — SOL NACL 0.9% IRRIG 1000ML BOTTLE 2F7124

## (undated) DEVICE — DRAPE TIBURON GENERAL ENDOSCOPY 9458

## (undated) DEVICE — TUBING SUCTION MEDI-VAC 1/4"X20' N620A

## (undated) DEVICE — PREP CHLORHEXIDINE 4% 4OZ (HIBICLENS) 57504

## (undated) DEVICE — GLOVE BIOGEL PI ULTRATOUCH G SZ 7.5 42175

## (undated) DEVICE — STRAP POSITIONING 60X31" BODY KNEE KBS 01

## (undated) DEVICE — CATH BALLOON INFLATION OPTILUME 30FR X 3CM 1110-10030B

## (undated) DEVICE — CATH FOLEY COUNCIL 16FR 5ML LATEX 0196L16

## (undated) DEVICE — GOWN LG DISP 9515

## (undated) DEVICE — DRAPE UNDER BUTTOCK 89415

## (undated) DEVICE — SPONGE RAY-TEC 4X8" 7318

## (undated) DEVICE — BAG URINARY DRAIN 2000ML LF 154002

## (undated) DEVICE — PREP DYNA-HEX 4% CHG SCRUB 4OZ BOTTLE MDS098710

## (undated) DEVICE — CONTAINER CHEMO WASTE 8GL YLW 8985

## (undated) DEVICE — SU PDS II 0 ENDOLOOP EZ10G

## (undated) DEVICE — PREP CHLORAPREP 26ML TINTED ORANGE  260815

## (undated) DEVICE — GLOVE BIOGEL PI MICRO SZ 5.5 48555

## (undated) DEVICE — ENDO TROCAR SLEEVE KII ADV FIXATION 05X100MM CFS02

## (undated) RX ORDER — DEXAMETHASONE SODIUM PHOSPHATE 4 MG/ML
INJECTION, SOLUTION INTRA-ARTICULAR; INTRALESIONAL; INTRAMUSCULAR; INTRAVENOUS; SOFT TISSUE
Status: DISPENSED
Start: 2025-07-02

## (undated) RX ORDER — BUPIVACAINE HYDROCHLORIDE AND EPINEPHRINE 2.5; 5 MG/ML; UG/ML
INJECTION, SOLUTION EPIDURAL; INFILTRATION; INTRACAUDAL; PERINEURAL
Status: DISPENSED
Start: 2025-07-02

## (undated) RX ORDER — ONDANSETRON 2 MG/ML
INJECTION INTRAMUSCULAR; INTRAVENOUS
Status: DISPENSED
Start: 2025-07-02

## (undated) RX ORDER — FENTANYL CITRATE 50 UG/ML
INJECTION, SOLUTION INTRAMUSCULAR; INTRAVENOUS
Status: DISPENSED
Start: 2025-07-02

## (undated) RX ORDER — DEXAMETHASONE SODIUM PHOSPHATE 10 MG/ML
INJECTION, SOLUTION INTRAMUSCULAR; INTRAVENOUS
Status: DISPENSED
Start: 2024-09-10

## (undated) RX ORDER — FENTANYL CITRATE-0.9 % NACL/PF 10 MCG/ML
PLASTIC BAG, INJECTION (ML) INTRAVENOUS
Status: DISPENSED
Start: 2025-07-02

## (undated) RX ORDER — LIDOCAINE HYDROCHLORIDE 10 MG/ML
INJECTION, SOLUTION EPIDURAL; INFILTRATION; INTRACAUDAL; PERINEURAL
Status: DISPENSED
Start: 2024-09-10

## (undated) RX ORDER — SEVOFLURANE 250 ML/250ML
LIQUID RESPIRATORY (INHALATION)
Status: DISPENSED
Start: 2025-07-02

## (undated) RX ORDER — PROPOFOL 10 MG/ML
INJECTION, EMULSION INTRAVENOUS
Status: DISPENSED
Start: 2025-07-02

## (undated) RX ORDER — PROPOFOL 10 MG/ML
INJECTION, EMULSION INTRAVENOUS
Status: DISPENSED
Start: 2024-09-10

## (undated) RX ORDER — ONDANSETRON 2 MG/ML
INJECTION INTRAMUSCULAR; INTRAVENOUS
Status: DISPENSED
Start: 2024-09-10

## (undated) RX ORDER — FENTANYL CITRATE 50 UG/ML
INJECTION, SOLUTION INTRAMUSCULAR; INTRAVENOUS
Status: DISPENSED
Start: 2024-09-10